# Patient Record
Sex: FEMALE | Race: WHITE | Employment: OTHER | URBAN - METROPOLITAN AREA
[De-identification: names, ages, dates, MRNs, and addresses within clinical notes are randomized per-mention and may not be internally consistent; named-entity substitution may affect disease eponyms.]

---

## 2017-08-14 ENCOUNTER — NURSING HOME VISIT (OUTPATIENT)
Dept: GERIATRICS | Facility: CLINIC | Age: 82
End: 2017-08-14
Payer: COMMERCIAL

## 2017-08-14 VITALS
WEIGHT: 120.8 LBS | HEART RATE: 73 BPM | SYSTOLIC BLOOD PRESSURE: 129 MMHG | BODY MASS INDEX: 20.1 KG/M2 | OXYGEN SATURATION: 95 % | RESPIRATION RATE: 18 BRPM | TEMPERATURE: 97.3 F | DIASTOLIC BLOOD PRESSURE: 73 MMHG

## 2017-08-14 DIAGNOSIS — S32.591D: ICD-10-CM

## 2017-08-14 DIAGNOSIS — J18.9 PNEUMONIA DUE TO INFECTIOUS ORGANISM, UNSPECIFIED LATERALITY, UNSPECIFIED PART OF LUNG: ICD-10-CM
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
## 2017-08-14 DIAGNOSIS — R41.0 DELIRIUM: ICD-10-CM

## 2017-08-14 DIAGNOSIS — D62 ANEMIA DUE TO BLOOD LOSS, ACUTE: ICD-10-CM

## 2017-08-14 DIAGNOSIS — S32.019D CLOSED FRACTURE OF FIRST LUMBAR VERTEBRA WITH ROUTINE HEALING, UNSPECIFIED FRACTURE MORPHOLOGY, SUBSEQUENT ENCOUNTER: ICD-10-CM

## 2017-08-14 DIAGNOSIS — M30.0 PAN (POLYARTERITIS NODOSA) (H): ICD-10-CM

## 2017-08-14 DIAGNOSIS — I50.22 CHRONIC SYSTOLIC CONGESTIVE HEART FAILURE (H): ICD-10-CM

## 2017-08-14 DIAGNOSIS — R41.89 COGNITIVE IMPAIRMENT: ICD-10-CM

## 2017-08-14 DIAGNOSIS — S72.001D CLOSED FRACTURE OF NECK OF RIGHT FEMUR WITH ROUTINE HEALING: Primary | ICD-10-CM

## 2017-08-14 DIAGNOSIS — R13.12 OROPHARYNGEAL DYSPHAGIA: ICD-10-CM

## 2017-08-14 PROCEDURE — 99310 SBSQ NF CARE HIGH MDM 45: CPT | Performed by: NURSE PRACTITIONER

## 2017-08-14 RX ORDER — AZATHIOPRINE 50 MG/1
150 TABLET ORAL DAILY
COMMUNITY

## 2017-08-14 RX ORDER — LANOLIN ALCOHOL/MO/W.PET/CERES
3 CREAM (GRAM) TOPICAL AT BEDTIME
COMMUNITY
Start: 2017-08-11

## 2017-08-14 RX ORDER — ALBUTEROL SULFATE 90 UG/1
2 AEROSOL, METERED RESPIRATORY (INHALATION) EVERY 4 HOURS PRN
COMMUNITY

## 2017-08-14 RX ORDER — CALCIUM CARBONATE 500(1250)
1 TABLET ORAL 2 TIMES DAILY
COMMUNITY
End: 2017-08-16

## 2017-08-14 RX ORDER — LEVOTHYROXINE SODIUM 88 UG/1
88 TABLET ORAL DAILY
COMMUNITY
Start: 2017-09-20

## 2017-08-14 RX ORDER — ACETAMINOPHEN 325 MG/1
650 TABLET ORAL 2 TIMES DAILY PRN
Status: ON HOLD | COMMUNITY
Start: 2017-08-11 | End: 2019-07-02

## 2017-08-14 NOTE — PROGRESS NOTES
[facility]  PRIMARY CARE PROVIDER AND CLINIC:  Susan Hanley [facility] [address] [facility] [address]  Chief Complaint   Patient presents with     Hospital F/U       HPI:    Rayne Mabry is a 87 year old  (4/26/1930),admitted to the [facility] from [facility].  Hospital stay 07/26/2017 through 08/11/2017.  Admitted to this facility for  rehab, medical management and nursing care.  HPI information obtained from: facility chart records, facility staff, patient report and Care Everywhere Epic chart review.    She was hospitalized after a fall resulting in right femur fracture, right inferior pubic ramus fracture and L1 vertebral fracture. She was disoriented in the ED. CT head negative for acute finding.   Ortho consulted and she underwent right LEXIS on 7/27/2017. Tolerated the procedure well. Lovenox started for DVT prophylaxis. She is WBAT. Neurosurgery recommended TLSO for L1 fracture.   On admission, she was hypoxic with leukocytosis. CT chest showed consolidative opacities and she received azithromycin and ceftriaxone for suspected pneumonia vs reactivation of mycobacterium avium complex pneumonia. ID consulted and felt low likelihood of MAC reactivation.   Hospital course complicated by post op delirium with hallucinations that improved after stopping all sedating meds. Zoloft was decreased per Psychiatry. She had an episode of Afib with RVR and probable systolic CHF exacerbation that improved with diuresis. HR spontaneously returned to NSR. She required luna catheter for urinary retention which resolved.     She is a poor historian due to cognitive impairment.     Current issues are:         Closed fracture of neck of right femur with routine healing-reports pain of her low back. Denies other areas of pain.   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   Delirium-confused with word finding difficulty. She's not able to state where she lives or what happened that she ended up in the hospital. Nurses report mild, non frightening hallucinations. Responds to simple yes/no questions.   Cognitive impairment  Oropharyngeal dysphagia-tolerating mechanical soft diet. Reports her appetite is \"so so.\"     BP's: 129/73, 109/75, 107/68  HR: 64-85     CODE STATUS/ADVANCE DIRECTIVES DISCUSSION:   CPR/Full code   Patient's living condition: lives alone    ALLERGIES:Zolpidem  PAST MEDICAL HISTORY:  has a past medical history of Arthritis; Depression; MAC (mycobacterium avium-intracellulare complex); Polyarteritis nodosa (H); and Thyroid disease.  PAST SURGICAL HISTORY:  has a past surgical history that includes GYN surgery.  FAMILY HISTORY: family history is not on file.  SOCIAL HISTORY:  reports that she has never smoked. She has never used smokeless tobacco. She reports that she drinks alcohol.    Post Discharge Medication Reconciliation Status: discharge medications reconciled, continue medications without change.  Current Outpatient Prescriptions   Medication Sig Dispense Refill     acetaminophen (TYLENOL) 325 MG tablet Take 650 mg by mouth every 4 hours (while awake)       albuterol (PROAIR HFA/PROVENTIL HFA/VENTOLIN HFA) 108 (90 BASE) MCG/ACT Inhaler Inhale 1-2 puffs into the lungs every 4 hours as needed       azaTHIOprine (IMURAN) 50 MG tablet Take 150 mg by mouth daily       docusate (COLACE) 50 MG/5ML liquid Take 10 mLs by mouth 2 times daily       enoxaparin (LOVENOX) 40 MG/0.4ML injection Inject 40 mg Subcutaneous daily " For 19 days.       melatonin 3 MG tablet Take 3 mg by mouth nightly as needed       omeprazole (PRILOSEC) 20 MG CR capsule Take 20 mg by mouth 2 times daily Do not crush       sertraline (ZOLOFT) 50 MG tablet Take 50 mg by mouth daily       levothyroxine (SYNTHROID/LEVOTHROID) 50 MCG tablet Take 50 mcg by mouth daily       METOPROLOL SUCCINATE ER PO Take 25 mg by mouth daily       calcium carbonate (OS-MICHELLE 500 MG [place]. [place]) 1250 MG tablet Take 1 tablet by mouth 2 times daily       cholecalciferol (VITAMIN D) 1000 UNIT tablet Take 1 tablet by mouth daily. 100 tablet        ROS:  Limited due to cognitive impairment. Does not respond appropriately to questions.     Exam:  /73  Pulse 73  Temp 97.3  F (36.3  C)  Resp 18  Wt 120 lb 12.8 oz (54.8 kg)  SpO2 95%  BMI 20.1 kg/m2  GENERAL APPEARANCE:  Alert, in no distress  ENT:  Mouth and posterior oropharynx normal, moist mucous membranes, [place]  EYES:  EOM normal, conjunctiva and lids normal, PERRL  NECK:  No adenopathy,masses or thyromegaly  RESP:  respiratory effort and palpation of chest normal, lungs clear to auscultation , no respiratory distress  CV:  Palpation and auscultation of heart done , regular rate and rhythm, no murmur, no edema, +2 pedal pulses  ABDOMEN:  normal bowel sounds, soft, nontender, no hepatosplenomegaly or other masses  M/S:   Wheelchair, wearing TLSO. Mild weakness RLE. Good strength other extremities, no joint inflammation  SKIN:  right hip incision clean,dry, intact, no erythema. No rashes or open areas  PSYCH:  oriented to self, situation, insight and judgement impaired, memory impaired    Lab/Diagnostic data:  PANEL BASIC METABOLIC (BMP) (08/09/2017 10:08 AM)  PANEL BASIC METABOLIC (BMP) (08/09/2017 10:08 AM)   Component Value Ref Range   Sodium 138 135 - 148 mEq/L   Potassium 4.2 3.5 - 5.3 mEq/L   Chloride 105 92 - 108 mEq/L   CO2 20 (L) 22 - 30 mEq/L   AnGap 14 8 - 16 mEq/L   Glucose 98 70 - 100 mg/dL   BUN 14 8 - 23 mg/dL    Creatinine 0.51 0.50 - 1.00 mg/dL   Calcium 9.3 8.8 - 10.2 mg/dL   GFR, African American 138 >=60 ml/min/1.73m2   GFR, Non- 114 >=60 ml/min/1.73m2   Basic Metabolic Panel Performed at: [facility]  Comment:   [facility]  [address] [place] [id number]           CBC WITH PLTS/AUTO DIFF (07/29/2017 11:18 AM)  CBC WITH PLTS/AUTO DIFF (07/29/2017 11:18 AM)   Component Value Ref Range   WBC 13.16 (H) 4.00 - 10.00 k/cmm   RBC 3.44 (L) 3.90 - 5.20 m/cmm   Hgb 10.6 (L) 11.5 - 15.7 g/dL   Hematocrit 32.8 (L) 34.0 - 45.0 %   MCV 95.3 80.0 - 100.0 fL   MCH 30.8 25.0 - 32.0 pg   MCHC 32.3 31.0 - 36.0 g/dL   RDW 13.6 11.5 - 14.5 %   Plt 148 (L) 150 - 400 k/cmm   MPV 10.3 6.5 - 12.5 fL   Automated Abs Neutrophil 11.53 (H)Comment: Preliminary ANC, final result to follow. 1.70 - 6.50 k/cmm   CBC Plt and Diff Performed at: [facility]  Comment:   [facility]  [address] [place] [id number]             ASSESSMENT / PLAN:  (S72.001D) Closed fracture of neck of right femur with routine healing  (primary encounter diagnosis)  (S32.591D) Fracture of right inferior pubic ramus, with routine healing, subsequent encounter  (S32.019D) Closed fracture of first lumbar vertebra with routine healing, unspecified fracture morphology, subsequent encounter  (W19.XXXD) Fall, subsequent encounter  Comment: pain appears controlled.   Plan: PHYSICAL THERAPY/OT.  TLSO when out of bed or with HOB >30 degrees. Lovenox for 19 days for DVT prophylaxis. Ortho follow up 9/6/2017. Neurosurgery follow up 1 month. Message left for daughter Darlene Mccloud.     (M30.0) PAN (polyarteritis nodosa) (H)  Comment: no acute issues. Neuropathy may have contributed to her fall.   Plan: continue Imuran. Rheumatology follow up per usual routine.     (I50.22) Chronic systolic congestive heart failure (H)  Comment: appears compensated. Not on diuretic.   Plan: daily weight. Monitor for s/s of fluid overload. Start low dose lasix if needed.     (D62)  Anemia due to blood loss, acute  Comment: surgical blood loss  Plan: CBC, BMP.     (J18.9) Pneumonia due to infectious organism, unspecified laterality, unspecified part of lung  Comment: infection appears resolved. History of mycobacterium avium complex.   Plan: monitor respiratory status. Continue albuterol prn.     (R41.0) Delirium  (R41.89) Cognitive impairment  Comment: remains confused with word finding difficulty and hallucinations.   Plan: supportive care. Avoid narcotics.     (R13.12) Oropharyngeal dysphagia  Comment: possibly related to delirium. Tolerating mechanical soft diet.   Plan: SPEECH THERAPY eval and treat, advance diet as tolerated.         Electronically signed by:  HADLEY Parra CNP

## 2017-08-15 ENCOUNTER — TRANSFERRED RECORDS (OUTPATIENT)
Dept: HEALTH INFORMATION MANAGEMENT | Facility: CLINIC | Age: 82
End: 2017-08-15

## 2017-08-15 PROBLEM — D62 ANEMIA DUE TO BLOOD LOSS, ACUTE: Status: ACTIVE | Noted: 2017-08-15

## 2017-08-15 PROBLEM — R41.0 DELIRIUM: Status: ACTIVE | Noted: 2017-08-15

## 2017-08-15 PROBLEM — S32.591D: Status: ACTIVE | Noted: 2017-08-15
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
## 2017-08-15 PROBLEM — R53.81 PHYSICAL DECONDITIONING: Status: ACTIVE | Noted: 2017-08-15

## 2017-08-15 PROBLEM — R41.89 COGNITIVE IMPAIRMENT: Status: ACTIVE | Noted: 2017-08-15

## 2017-08-15 PROBLEM — J18.9 PNEUMONIA: Status: ACTIVE | Noted: 2017-08-15

## 2017-08-15 PROBLEM — R13.12 OROPHARYNGEAL DYSPHAGIA: Status: ACTIVE | Noted: 2017-08-15

## 2017-08-15 PROBLEM — S72.001D CLOSED FRACTURE OF NECK OF RIGHT FEMUR WITH ROUTINE HEALING: Status: ACTIVE | Noted: 2017-08-15

## 2017-08-15 PROBLEM — I50.22 CHRONIC SYSTOLIC CONGESTIVE HEART FAILURE (H): Status: ACTIVE | Noted: 2017-08-15

## 2017-08-15 PROBLEM — S32.019D CLOSED FRACTURE OF FIRST LUMBAR VERTEBRA WITH ROUTINE HEALING: Status: ACTIVE | Noted: 2017-08-15

## 2017-08-15 LAB
ANION GAP SERPL CALCULATED.3IONS-SCNC: 11 MMOL/L (ref 3–14)
BUN SERPL-MCNC: 14 MG/DL (ref 7–30)
CALCIUM SERPL-MCNC: 9.8 MG/DL (ref 8.5–10.1)
CHLORIDE SERPLBLD-SCNC: 101 MMOL/L (ref 94–109)
CO2 SERPL-SCNC: 25 MMOL/L (ref 20–32)
CREAT SERPL-MCNC: 0.47 MG/DL (ref 0.52–1.04)
ERYTHROCYTE [DISTWIDTH] IN BLOOD BY AUTOMATED COUNT: 13.8 % (ref 10–15)
GFR SERPL CREATININE-BSD FRML MDRD: >90 ML/MIN/1.73M2
GLUCOSE SERPL-MCNC: 72 MG/DL (ref 70–99)
HCT VFR BLD AUTO: 38.4 % (ref 35–47)
HEMOGLOBIN: 12.5 G/DL (ref 11.7–15.7)
MCH RBC QN AUTO: 31.3 PG (ref 26.5–33)
MCHC RBC AUTO-ENTMCNC: 32.6 G/DL (ref 31.5–36.5)
MCV RBC AUTO: 96 FL (ref 78–100)
PLATELET # BLD AUTO: 564 10^9/L (ref 150–450)
POTASSIUM SERPL-SCNC: 3.3 MMOL/L (ref 3.4–5.3)
RBC # BLD AUTO: 4 10^12/L (ref 3.8–5.2)
SODIUM SERPL-SCNC: 137 MMOL/L (ref 133–144)
WBC # BLD AUTO: 13 10^9/L (ref 4–11)

## 2017-08-16 ENCOUNTER — NURSING HOME VISIT (OUTPATIENT)
Dept: GERIATRICS | Facility: CLINIC | Age: 82
End: 2017-08-16
Payer: COMMERCIAL

## 2017-08-16 VITALS
BODY MASS INDEX: 19.46 KG/M2 | DIASTOLIC BLOOD PRESSURE: 66 MMHG | SYSTOLIC BLOOD PRESSURE: 111 MMHG | TEMPERATURE: 97.7 F | RESPIRATION RATE: 18 BRPM | HEIGHT: 65 IN | OXYGEN SATURATION: 94 % | WEIGHT: 116.8 LBS | HEART RATE: 88 BPM

## 2017-08-16 DIAGNOSIS — S32.019D CLOSED FRACTURE OF FIRST LUMBAR VERTEBRA WITH ROUTINE HEALING, UNSPECIFIED FRACTURE MORPHOLOGY, SUBSEQUENT ENCOUNTER: ICD-10-CM

## 2017-08-16 DIAGNOSIS — R13.12 OROPHARYNGEAL DYSPHAGIA: ICD-10-CM

## 2017-08-16 DIAGNOSIS — S72.001D CLOSED FRACTURE OF NECK OF RIGHT FEMUR WITH ROUTINE HEALING: Primary | ICD-10-CM

## 2017-08-16 DIAGNOSIS — R41.89 COGNITIVE IMPAIRMENT: ICD-10-CM

## 2017-08-16 DIAGNOSIS — S32.591D: ICD-10-CM

## 2017-08-16 DIAGNOSIS — R53.81 PHYSICAL DECONDITIONING: ICD-10-CM

## 2017-08-16 DIAGNOSIS — R41.0 DELIRIUM: ICD-10-CM

## 2017-08-16 DIAGNOSIS — I50.22 CHRONIC SYSTOLIC CONGESTIVE HEART FAILURE (H): ICD-10-CM

## 2017-08-16 DIAGNOSIS — M30.0 PAN (POLYARTERITIS NODOSA) (H): ICD-10-CM

## 2017-08-16 PROCEDURE — 99309 SBSQ NF CARE MODERATE MDM 30: CPT | Performed by: NURSE PRACTITIONER

## 2017-08-16 NOTE — PROGRESS NOTES
"[facility]    Chief Complaint   Patient presents with     RECHECK       HPI:    Rayne Mabry is a 87 year old  (4/26/1930), who is being seen today for an episodic care visit at [place] [facility].  HPI information obtained from: facility chart records, facility staff, patient report and Care Everywhere Epic chart review and patient's daughter Shanel Dubois.   She came to this facility  for short term rehab and medical management 8/11/2017 [place] [facility] [place] a fall resulting in right femur fracture, right inferior pubic ramus fracture and L1 vertebral fracture. She was disoriented in the ED. CT head negative for acute finding. She underwent right LEXIS on 7/27/2017. Lovenox started for DVT prophylaxis. She is WBAT. Neurosurgery recommended TLSO for L1 fracture.   On hospital admission, she was hypoxic with leukocytosis. CT chest showed consolidative opacities and she received azithromycin and ceftriaxone for suspected pneumonia vs reactivation of mycobacterium avium complex pneumonia. ID consulted and felt low likelihood of MAC reactivation.   Hospital course complicated by post op delirium with hallucinations that improved after stopping all sedating meds. Zoloft was decreased per Psychiatry. She had an episode of Afib with RVR and probable systolic CHF exacerbation that improved with diuresis. HR spontaneously returned to NSR.      Current issues are:       Closed fracture of neck of right femur with routine healing  Closed fracture of first lumbar vertebra with routine healing, unspecified fracture morphology, subsequent encounter  Fracture of right inferior pubic ramus, with routine healing, subsequent encounter-reports low back pain is improving and controlled with tylenol. Compliant with TLSO. Denies other areas of pain.   Delirium  Cognitive impairment-she's more interactive today, but remains confused. She recognizes that she is confused and \"foggy.\" Daughter reports that prior to the fall, " patient was independent at home and driving. Son manages finances.   Chronic systolic congestive heart failure (H)-denies cough, shortness of breath or chest pain.   PAN (polyarteritis nodosa) (H)-her rheumatologist is Dr Juan Do. Daughter reports this has been well controlled with Imuran.   Oropharyngeal dysphagia-speech therapist reports significant pocketing of food and the need for cues with eating. Daughter does not believe dysphagia was a previous problem. No history of CVA.   Physical deconditioning-ambulating 56 ft with walker and contact guard assist. Requires max assist with all cares       Daughter confirms patient has an Advanced Directive and believes code status is DNR/DNI-she will review and bring in a copy.     BP's: 111/66, 129/73, 109/75  HR: 73-88     ALLERGIES: Zolpidem  Past Medical, Surgical, Family and Social History reviewed and updated in [employer].    Current Outpatient Prescriptions   Medication Sig Dispense Refill     Calcium-Magnesium-Vitamin D (CALCIUM 500 PO) Take 500 mg by mouth 2 times daily       POTASSIUM CHLORIDE ER PO Take 10 mEq by mouth daily       acetaminophen (TYLENOL) 325 MG tablet Take 650 mg by mouth every 4 hours (while awake)       albuterol (PROAIR HFA/PROVENTIL HFA/VENTOLIN HFA) 108 (90 BASE) MCG/ACT Inhaler Inhale 1-2 puffs into the lungs every 4 hours as needed       azaTHIOprine (IMURAN) 50 MG tablet Take 150 mg by mouth daily       docusate (COLACE) 50 MG/5ML liquid Take 10 mLs by mouth 2 times daily       enoxaparin (LOVENOX) 40 MG/0.4ML injection Inject 40 mg Subcutaneous daily For 16 days.       melatonin 3 MG tablet Take 3 mg by mouth nightly as needed       omeprazole (PRILOSEC) 20 MG CR capsule Take 20 mg by mouth 2 times daily Do not crush       sertraline (ZOLOFT) 50 MG tablet Take 50 mg by mouth daily       levothyroxine (SYNTHROID/LEVOTHROID) 50 MCG tablet Take 50 mcg by mouth daily       METOPROLOL SUCCINATE ER PO Take 25 mg by mouth daily        "cholecalciferol (VITAMIN D) 1000 UNIT tablet Take 1 tablet by mouth daily. 100 tablet      Medications reviewed:  Medications reconciled to facility chart and changes were made to reflect current medications as identified as above med list. Below are the changes that were made:   Medications stopped since last EPIC medication reconciliation:   Medications Discontinued During This Encounter   Medication Reason     calcium carbonate (OS-MICHELLE 500 MG [place]. [place]) 1250 MG tablet Medication Reconciliation Clean Up       Medications started since last [facility] medication reconciliation:  Orders Placed This Encounter   Medications     Calcium-Magnesium-Vitamin D (CALCIUM 500 PO)     Sig: Take 500 mg by mouth 2 times daily     POTASSIUM CHLORIDE ER PO     Sig: Take 10 mEq by mouth daily       REVIEW OF SYSTEMS:  10 point ROS of systems including Constitutional, Eyes, Respiratory, Cardiovascular, Gastroenterology, Genitourinary, Integumentary, Muscularskeletal, Psychiatric were all negative except for pertinent positives noted in my HPI.    Physical Exam:  /66  Pulse 88  Temp 97.7  F (36.5  C)  Resp 18  Ht 5' 5\" (1.651 m)  Wt 116 lb 12.8 oz (53 kg)  SpO2 94%  BMI 19.44 kg/m2  GENERAL APPEARANCE:  Alert, in no distress  ENT:  Mouth and posterior oropharynx normal, moist mucous membranes, [place]  EYES:  EOM normal, conjunctiva and lids normal  NECK:  No adenopathy,masses or thyromegaly  RESP:  respiratory effort and palpation of chest normal, lungs clear to auscultation , no respiratory distress  CV:  Palpation and auscultation of heart done , regular rate and rhythm, no murmur, no edema, +2 pedal pulses  ABDOMEN:  soft, nontender, no hepatosplenomegaly or other masses  M/S:  Sitting up in bed,  wearing TLSO. Mild weakness RLE. Good strength other extremities, no joint inflammation  SKIN:  right hip incision with steri strips clean,dry, intact, no erythema. No rashes or open areas  PSYCH:  oriented to self, situation, insight " and judgement impaired, memory impaired    Recent Labs:    Last Basic Metabolic Panel:  Lab Results   Component Value Date     08/15/2017      Lab Results   Component Value Date    POTASSIUM 3.3 08/15/2017     Lab Results   Component Value Date    CHLORIDE 101 08/15/2017     Lab Results   Component Value Date    MICHELLE 9.8 08/15/2017     Lab Results   Component Value Date    CO2 25 08/15/2017     Lab Results   Component Value Date    BUN 14 08/15/2017     Lab Results   Component Value Date    CR 0.47 08/15/2017     Lab Results   Component Value Date    GLC 72 08/15/2017     Lab Results   Component Value Date    WBC 13.0 08/15/2017     Lab Results   Component Value Date    RBC 4.00 08/15/2017     Lab Results   Component Value Date    HGB 12.5 08/15/2017     Lab Results   Component Value Date    HCT 38.4 08/15/2017     Lab Results   Component Value Date    MCV 96 08/15/2017     Lab Results   Component Value Date    MCH 31.3 08/15/2017     Lab Results   Component Value Date    MCHC 32.6 08/15/2017     Lab Results   Component Value Date    RDW 13.8 08/15/2017     Lab Results   Component Value Date     08/15/2017     ASSESSMENT / PLAN:  (S72.001D) Closed fracture of neck of right femur with routine healing  (primary encounter diagnosis)  (S32.019D) Closed fracture of first lumbar vertebra with routine healing, unspecified fracture morphology, subsequent encounter  (S32.591D) Fracture of right inferior pubic ramus, with routine healing, subsequent encounter  Comment: pain and mobility improving  Plan: continue tylenol. Therapies. TLSO when out of bed or HOB >30 degrees. Lovenox for 19 days total. Ortho follow up 9/6/2017. Neurosurgery follow up 1 month.    (R41.0) Delirium  (R41.89) Cognitive impairment  Comment: delirium appears to be clearing, although she continues to have significant deficits  Plan: cognitive testing in progress. Supportive care. If cognition does not continue to improve, consider repeat CT  head or possibly MRI.     (I50.22) Chronic systolic congestive heart failure (H)  Comment: compensated. Not on diuretic  Plan: closely monitor fluid status. Daily weight.     (M30.0) PAN (polyarteritis nodosa) (H)  Comment: no acute issues  Plan: continue Imuran.     (R13.12) Oropharyngeal dysphagia  Comment: etiology unclear, presumably related to delirium and acute illness  Plan: continue SPEECH THERAPY, advance diet as tolerated    (R53.81) Physical deconditioning  Comment: progressing in therapies  Plan: continue PHYSICAL THERAPY/OT. Disposition unclear at this time; family hoping she will be able to return home.         Electronically signed by  HADLEY Parra CNP

## 2017-08-17 ENCOUNTER — TRANSFERRED RECORDS (OUTPATIENT)
Dept: HEALTH INFORMATION MANAGEMENT | Facility: CLINIC | Age: 82
End: 2017-08-17

## 2017-08-17 VITALS
HEIGHT: 65 IN | RESPIRATION RATE: 18 BRPM | TEMPERATURE: 98.1 F | HEART RATE: 82 BPM | DIASTOLIC BLOOD PRESSURE: 67 MMHG | WEIGHT: 116.8 LBS | BODY MASS INDEX: 19.46 KG/M2 | SYSTOLIC BLOOD PRESSURE: 112 MMHG | OXYGEN SATURATION: 95 %

## 2017-08-17 LAB
ERYTHROCYTE [DISTWIDTH] IN BLOOD BY AUTOMATED COUNT: 13.9 % (ref 10–15)
HCT VFR BLD AUTO: 37.8 % (ref 35–47)
HEMOGLOBIN: 12.4 G/DL (ref 11.7–15.7)
MCH RBC QN AUTO: 31.3 PG (ref 26.5–33)
MCHC RBC AUTO-ENTMCNC: 32.8 G/DL (ref 31.5–36.5)
MCV RBC AUTO: 96 FL (ref 78–100)
PLATELET # BLD AUTO: 515 10E9/L (ref 150–450)
POTASSIUM SERPL-SCNC: 3.4 MMOL/L (ref 3.4–5.3)
RBC # BLD AUTO: 3.96 10E12/L (ref 3.8–5.2)
WBC # BLD AUTO: 9.9 10E9/L (ref 4–11)

## 2017-08-18 ENCOUNTER — NURSING HOME VISIT (OUTPATIENT)
Dept: GERIATRICS | Facility: CLINIC | Age: 82
End: 2017-08-18
Payer: COMMERCIAL

## 2017-08-18 DIAGNOSIS — R53.81 PHYSICAL DECONDITIONING: Primary | ICD-10-CM

## 2017-08-18 DIAGNOSIS — S32.019D CLOSED FRACTURE OF FIRST LUMBAR VERTEBRA WITH ROUTINE HEALING, UNSPECIFIED FRACTURE MORPHOLOGY, SUBSEQUENT ENCOUNTER: ICD-10-CM

## 2017-08-18 DIAGNOSIS — S72.001D CLOSED FRACTURE OF NECK OF RIGHT FEMUR WITH ROUTINE HEALING: ICD-10-CM

## 2017-08-18 DIAGNOSIS — R13.12 OROPHARYNGEAL DYSPHAGIA: ICD-10-CM

## 2017-08-18 DIAGNOSIS — Z96.641 S/P TOTAL HIP ARTHROPLASTY, RIGHT: ICD-10-CM

## 2017-08-18 DIAGNOSIS — M30.0 PAN (POLYARTERITIS NODOSA) (H): ICD-10-CM

## 2017-08-18 DIAGNOSIS — R41.89 COGNITIVE IMPAIRMENT: ICD-10-CM

## 2017-08-18 PROCEDURE — 99306 1ST NF CARE HIGH MDM 50: CPT | Performed by: INTERNAL MEDICINE

## 2017-08-18 NOTE — PROGRESS NOTES
PRIMARY CARE PROVIDER AND CLINIC RESPONSIBLE:  Susan Hanley [place] [facility] [address] [facility] [address]        ADMISSION HISTORY AND PHYSICAL EXAMINATION     Chief Complaint   Patient presents with     Fatigue     Musculoskeletal Problem     Confusion         HISTORY OF PRESENT ILLNESS:  87 year old female, (4/26/1930), admitted to the [facility] for continuation of medical care and rehab.    Rayne Mabry is a 87 year old female with history of depression, hypothyroidism, polyarteritis nodosa, hx of BILLIE complex who was admitted at [facility] from 7/26/17 to 8/11/17 due to mechanical fall with multiple trauma. Patient is poor historian and confused and unable to get history from her. Patient was delirious on admission at the hospital and confused throughout the her stay. Her injures include:  Displaced right femur fracture which required right LEXIS, non displaced inferior pubioc ramus fracture, L1 compression fracture, TLSO ordered by neurosurgery. Post-op course was significant an episode of Afib with RVR and probable systolic CHF exacerbation that improved with diuresis. HR spontaneously returned to NSR. She required luna catheter for urinary retention which resolved. Postop delirium with hallucinations that improved after stopping all sedating meds. Zoloft was decreased per Psychiatry. CT head was negative at the hospital. On admission, she was hypoxic with leukocytosis. CT chest showed consolidative opacities and she received azithromycin and ceftriaxone for suspected pneumonia vs reactivation of mycobacterium avium complex pneumonia. ID consulted and felt low likelihood of MAC reactivation. She is confused and poor historian since admission at TCU. She does not recall her children, their ages, where she lives and etc. Patient is seen and examined by me with Juan Diego Muse NP. Please see BOBBY Muse's admit noted dated 8/14/17 for details of admission, past medical history, family  history, allergies, medication list, social history and other details pertinent with this admission. Hospital admission and dc summary reviewed.      Past Medical History:   Diagnosis Date     Arthritis     rheumatoid arthritis     Depression      MAC (mycobacterium avium-intracellulare complex)      Polyarteritis nodosa (H)      Thyroid disease        Past Surgical History:   Procedure Laterality Date     GYN SURGERY      hyster       Current Outpatient Prescriptions   Medication Sig     Calcium-Magnesium-Vitamin D (CALCIUM 500 PO) Take 500 mg by mouth 2 times daily     POTASSIUM CHLORIDE ER PO Take 10 mEq by mouth daily     acetaminophen (TYLENOL) 325 MG tablet Take 650 mg by mouth every 4 hours (while awake)     albuterol (PROAIR HFA/PROVENTIL HFA/VENTOLIN HFA) 108 (90 BASE) MCG/ACT Inhaler Inhale 1-2 puffs into the lungs every 4 hours as needed     azaTHIOprine (IMURAN) 50 MG tablet Take 150 mg by mouth daily     docusate (COLACE) 50 MG/5ML liquid Take 10 mLs by mouth 2 times daily     enoxaparin (LOVENOX) 40 MG/0.4ML injection Inject 40 mg Subcutaneous daily For 16 days.     melatonin 3 MG tablet Take 3 mg by mouth At Bedtime      omeprazole (PRILOSEC) 20 MG CR capsule Take 20 mg by mouth 2 times daily Do not crush     sertraline (ZOLOFT) 50 MG tablet Take 50 mg by mouth daily     levothyroxine (SYNTHROID/LEVOTHROID) 50 MCG tablet Take 50 mcg by mouth daily     METOPROLOL SUCCINATE ER PO Take 25 mg by mouth daily Hold for SBP<100 or HR < 60     cholecalciferol (VITAMIN D) 1000 UNIT tablet Take 1 tablet by mouth daily.     No current facility-administered medications for this visit.        Allergies   Allergen Reactions     Zolpidem Other (See Comments)     Confusion even with low dose.       Social History     Social History     Marital status:      Spouse name: N/A     Number of children: N/A     Years of education: N/A     Occupational History     Not on file.     Social History Main Topics     Smoking  "status: Never Smoker     Smokeless tobacco: Never Used     Alcohol use Yes      Comment: 1 glass wine a night     Drug use: Not on file     Sexual activity: Not on file     Other Topics Concern     Not on file     Social History Narrative          Information reviewed:  Medications, vital signs, orders, nursing notes, problem list, hospital information.     ROS: Attempted 10 point review of system, unable due to cognitive impairment.    /67  Pulse 82  Temp 98.1  F (36.7  C)  Resp 18  Ht 5' 5\" (1.651 m)  Wt 116 lb 12.8 oz (53 kg)  SpO2 95%  BMI 19.44 kg/m2    PHYSICAL EXAMINATION:   GENERAL:  No acute distress.  SKIN:  Dry and warm.  There is no rash at area of skin examined.  HEENT:  Head without trauma.  Pupils round, reactive. Exam of conjunctiva and lids are normal. Sclera without icterus. There is no oral thrush.  NECK:  Supple. No jugular venous distension.  CHEST: No reproducible chest tenderness.   LUNGS:  Normal respiratory effort. Lungs reveal decreased breath sounds at bases. No rales or wheezes.  HEART:  Regular rate and rhythm.  No murmur, gallops or rubs auscultated.  ABDOMEN:  Soft, bowel sounds positive.  There is no tenderness or guarding.   EXTREMITIES: +1 right edema. Surgical site healing with steri-strips, mild tenderness.   NEUROLOGIC:  Alert and oriented x3.  Moving all ext. Gait not tested.  PSYCH: Recent and remote memory is impaired. Mood and affect are normal.    Lab/Diagnostic data:  Reviewed    CBC Lab Results (Last 5 results in 365 days)       WBC RBC Hgb Hct MCV MCH MCHC RDW Plt Neut # Lymph # Eos # Baso # Immat. Gran #   08/17/17 0000 9.9 3.96 12.4 37.8 96 31.3 32.8 13.9 515 -- -- -- -- --   08/15/17 0000 13.0 4.00 12.5 38.4 96 31.3 32.6 13.8 564 -- -- -- -- --   CMP Labs (Last 5 results in 365 days)       Na+ K+ Cl CO2 ANION GAP Glc BUN Creat GFR GFR-Black Calcium Mg ALT AST A1C TSH LDL HIV   08/17/17 0000 -- 3.4 -- -- -- -- -- -- -- -- -- -- -- -- -- -- -- --   08/15/17 " 0000 137 3.3 101 25 11 72 14 0.47 >90 >90 9.8 -- -- -- -- -- -- --     ECHO: 7/27/17  SUMMARY    The estimated left ventricular ejection fraction is 57 %.  There is no left ventricular wall motion abnormality identified.  The estimated pulmonary artery systolic pressure is 39 mmHg + RA pressure.  Right ventricular enlargement .  Decreased right ventricular systolic performance .  Inferior Vena Cava is dilated without respiratory variation .    CT head 7/26/17  Impression:    1. No acute intracranial pathology.  2. Mild changes of chronic small vessel ischemic disease.  3. Mild generalized parenchymal volume loss, appropriate for age.      ASSESSMENT / PLAN:     Physical deconditioning  Plan: PT/OT with increase independence, self-care, strength and endurance and other cares that help return to home/facility of origin, fall precautions. Care conference with patient and family for the progress of rehab and disposition issues will be discussed as planned. Rehab evaluation and other evaluations including CPT are at rehab logs, to be reviewed separately.  Fall risk assessment as well as cognitive evaluation so far performed:  SLUMS:  5/30    Closed fracture of neck of right femur with routine healing S/P total hip arthroplasty, right  Plan: PT/OT, pain medication, DVT prophylaxis with Lovenox as per orthopedic team. Follow up orthopedic clinic as scheduled. Incentive spirometry prn.    Closed fracture of first lumbar vertebra with routine healing, unspecified fracture morphology, subsequent encounter  Plan: Continue TLSO. Follow up neurosurgery team as scheduled.    Cognitive impairment  Plan: most likely due to dementia. SUMS 5/30. Continue fall and delirium prevention.      PAN (polyarteritis nodosa) (H)  Plan: Continue Imuran, follow up rheumatologist as outpatient.    Oropharyngeal dysphagia  Plan: Continue DD diet, speech therapist to guide treatment and evaluate progress.    Other problems with same care. Primary  care doctor and other specialists to address those chronic problems in next clinic appointment to be scheduled upon discharge from the TCU.      Total time spent with patient visit was 36 min including patient visit, review of past records, patients counseling and coordinating care.        Jos Guevara MD

## 2017-08-18 NOTE — MR AVS SNAPSHOT
"              After Visit Summary   8/18/2017    Rayne Mabry    MRN: [id number]           Patient Information     Date Of Birth          4/26/1930        Visit Information        Provider Department      8/18/2017 11:29 AM Jos Guevara MD [facility]        Today's Diagnoses     Physical deconditioning    -  1    Closed fracture of neck of right femur with routine healing        S/P total hip arthroplasty, right        Closed fracture of first lumbar vertebra with routine healing, unspecified fracture morphology, subsequent encounter        Cognitive impairment        PAN (polyarteritis nodosa) (H)        Oropharyngeal dysphagia           Follow-ups after your visit        Who to contact     If you have questions or need follow up information about today's clinic visit or your schedule please contact [facility] directly at [phone].  Normal or non-critical lab and imaging results will be communicated to you by MyChart, letter or phone within 4 business days after the clinic has received the results. If you do not hear from us within 7 days, please contact the clinic through MyChart or phone. If you have a critical or abnormal lab result, we will notify you by phone as soon as possible.  Submit refill requests through [employer]â„¢ or call your pharmacy and they will forward the refill request to us. Please allow 3 business days for your refill to be completed.          Additional Information About Your Visit        [employer] Information     [employer] lets you send messages to your doctor, view your test results, renew your prescriptions, schedule appointments and more. To sign up, go to [email or web address] . Click on \"Log in\" on the left side of the screen, which will take you to the Welcome page. Then click on \"Sign up Now\" on the right side of the page.     You will be asked to enter the access code listed below, as well as some personal information. Please follow the " "directions to create your username and password.     Your access code is: [email or web address]  Expires: 2017 11:03 AM     Your access code will  in 90 days. If you need help or a new code, please call your [facility] clinic or [phone].        Care EveryWhere ID     This is your Care EveryWhere ID. This could be used by other organizations to access your [facility] medical records  [id number]        Your Vitals Were     Pulse Temperature Respirations Height Pulse Oximetry BMI (Body Mass Index)    82 98.1  F (36.7  C) 18 5' 5\" (1.651 m) 95% 19.44 kg/m2       Blood Pressure from Last 3 Encounters:   17 112/67   17 111/66   17 129/73    Weight from Last 3 Encounters:   17 116 lb 12.8 oz (53 kg)   17 116 lb 12.8 oz (53 kg)   17 120 lb 12.8 oz (54.8 kg)              Today, you had the following     No orders found for display       Primary Care Provider Office Phone # Fax #    Susan Hanley [phone]       [address] [address]        Equal Access to Services     JEANNE GOMEZ AH: Hadii lizette ku hadasho Soomaali, waaxda luqadaha, qaybta kaalmada adeegyada, waxay idiin hayhermelindan elizabeth pichardo . So [facility] [phone].    ATENCIÓN: Si habla español, tiene a gardiner disposición servicios gratuitos de asistencia lingüística. ame al [phone].    We comply with applicable federal civil rights laws and [place] laws. We do not discriminate on the basis of race, color, national origin, age, disability sex, sexual orientation or gender identity.            Thank you!     Thank you for choosing [facility]  for your care. Our goal is always to provide you with excellent care. Hearing back from our patients is one way we can continue to improve our services. Please take a few minutes to complete the written survey that you may receive in the mail after your visit with us. Thank you!             Your Updated " Medication List - Protect others around you: Learn how to safely use, store and throw away your medicines at www.disposemymeds.org.          This list is accurate as of: 8/18/17 11:03 AM.  Always use your most recent med list.                   Brand Name Dispense Instructions for use Diagnosis    acetaminophen 325 MG tablet    TYLENOL     Take 650 mg by mouth every 4 hours (while awake)        albuterol 108 (90 BASE) MCG/ACT Inhaler    PROAIR HFA/PROVENTIL HFA/VENTOLIN HFA     Inhale 1-2 puffs into the lungs every 4 hours as needed        azaTHIOprine 50 MG tablet    IMURAN     Take 150 mg by mouth daily        CALCIUM 500 PO      Take 500 mg by mouth 2 times daily        cholecalciferol 1000 UNIT tablet    vitamin D    100 tablet    Take 1 tablet by mouth daily.    PAN (polyarteritis nodosa) (H)       docusate 50 MG/5ML liquid    COLACE     Take 10 mLs by mouth 2 times daily        enoxaparin 40 MG/0.4ML injection    LOVENOX     Inject 40 mg Subcutaneous daily For 16 days.        levothyroxine 50 MCG tablet    SYNTHROID/LEVOTHROID     Take 50 mcg by mouth daily        melatonin 3 MG tablet      Take 3 mg by mouth At Bedtime        METOPROLOL SUCCINATE ER PO      Take 25 mg by mouth daily Hold for SBP<100 or HR < 60        omeprazole 20 MG CR capsule    priLOSEC     Take 20 mg by mouth 2 times daily Do not crush        POTASSIUM CHLORIDE ER PO      Take 10 mEq by mouth daily        sertraline 50 MG tablet    ZOLOFT     Take 50 mg by mouth daily

## 2017-08-21 VITALS
OXYGEN SATURATION: 99 % | HEART RATE: 74 BPM | WEIGHT: 117 LBS | BODY MASS INDEX: 19.49 KG/M2 | HEIGHT: 65 IN | RESPIRATION RATE: 18 BRPM | TEMPERATURE: 97.3 F | SYSTOLIC BLOOD PRESSURE: 135 MMHG | DIASTOLIC BLOOD PRESSURE: 85 MMHG

## 2017-08-22 ENCOUNTER — NURSING HOME VISIT (OUTPATIENT)
Dept: GERIATRICS | Facility: CLINIC | Age: 82
End: 2017-08-22
Payer: COMMERCIAL

## 2017-08-22 DIAGNOSIS — I50.22 CHRONIC SYSTOLIC CONGESTIVE HEART FAILURE (H): ICD-10-CM

## 2017-08-22 DIAGNOSIS — S32.019D CLOSED FRACTURE OF FIRST LUMBAR VERTEBRA WITH ROUTINE HEALING, UNSPECIFIED FRACTURE MORPHOLOGY, SUBSEQUENT ENCOUNTER: ICD-10-CM

## 2017-08-22 DIAGNOSIS — R13.12 OROPHARYNGEAL DYSPHAGIA: ICD-10-CM

## 2017-08-22 DIAGNOSIS — J18.9 PNEUMONIA DUE TO INFECTIOUS ORGANISM, UNSPECIFIED LATERALITY, UNSPECIFIED PART OF LUNG: ICD-10-CM

## 2017-08-22 DIAGNOSIS — E87.6 HYPOKALEMIA: ICD-10-CM

## 2017-08-22 DIAGNOSIS — S32.591D: ICD-10-CM

## 2017-08-22 DIAGNOSIS — S72.001D CLOSED FRACTURE OF NECK OF RIGHT FEMUR WITH ROUTINE HEALING: Primary | ICD-10-CM

## 2017-08-22 DIAGNOSIS — R41.89 COGNITIVE IMPAIRMENT: ICD-10-CM

## 2017-08-22 DIAGNOSIS — R53.81 PHYSICAL DECONDITIONING: ICD-10-CM

## 2017-08-22 PROCEDURE — 99309 SBSQ NF CARE MODERATE MDM 30: CPT | Performed by: NURSE PRACTITIONER

## 2017-08-22 RX ORDER — AMOXICILLIN 250 MG
1 CAPSULE ORAL DAILY PRN
Status: ON HOLD | COMMUNITY
End: 2019-07-03

## 2017-08-22 NOTE — PROGRESS NOTES
[facility]    Chief Complaint   Patient presents with     RECHECK       HPI:    Rayne Mabry is a 87 year old  (4/26/1930), who is being seen today for an episodic care visit at [place] [facility].  HPI information obtained from: facility chart records, facility staff, patient report and Care Everywhere Epic chart review and patient's daughter Shanel Dubois.   She came to this facility  for short term rehab and medical management 8/11/2017 from [facility] after a fall resulting in right femur fracture, right inferior pubic ramus fracture and L1 vertebral fracture. She was disoriented in the ED. CT head negative for acute finding. She underwent right LEXIS on 7/27/2017. Lovenox started for DVT prophylaxis. She is WBAT. Neurosurgery recommended TLSO for L1 fracture.   On hospital admission, she was hypoxic with leukocytosis. CT chest showed consolidative opacities and she received azithromycin and ceftriaxone for suspected pneumonia vs reactivation of mycobacterium avium complex pneumonia. ID consulted and felt low likelihood of MAC reactivation.   Hospital course complicated by post op delirium with hallucinations that improved after stopping all sedating meds. Zoloft was decreased per Psychiatry. She had an episode of Afib with RVR and probable systolic CHF exacerbation that improved with diuresis. HR spontaneously returned to NSR.        Current issues are:       Closed fracture of neck of right femur with routine healing-reports pain is controlled. Her daughter Darlene Mccloud is here and feels she's making progress. Compliant with TLSO.   Fracture of right inferior pubic ramus, with routine healing, subsequent encounter  Closed fracture of first lumbar vertebra with routine healing, unspecified fracture morphology, subsequent encounter  Chronic systolic congestive heart failure (H)-weight down 3 lbs. No edema. HR: 64-88 and has been regular since tcu admission.  BPs: 135/85, 101/67, 119/77  mmHg  Oropharyngeal dysphagia-remains on mechanical soft diet and tends to pocket food. No coughing or choking with meals.   Pneumonia due to infectious organism, unspecified laterality, unspecified part of lung-afebrile. Denies cough, shortness of breath or respiratory symptoms.   Cognitive impairment-brighter and more interactive. Responds to questions appropriately. Daughter agrees she has improved, but reports she is not back to her baseline.   Hypokalemia-replacement started for K 3.3. No GI losses, not on diuretic.   Physical deconditioning-ambulating short distances with walker and contact guard assist. Requires assist of 1 with all cares      ALLERGIES: Zolpidem  Past Medical, Surgical, Family and Social History reviewed and updated in [facility].    Current Outpatient Prescriptions   Medication Sig Dispense Refill     senna-docusate (SENOKOT-S;PERICOLACE) 8.6-50 MG per tablet Take 1 tablet by mouth 2 times daily       Calcium-Magnesium-Vitamin D (CALCIUM 500 PO) Take 500 mg by mouth 2 times daily       POTASSIUM CHLORIDE ER PO Take 10 mEq by mouth daily       acetaminophen (TYLENOL) 325 MG tablet Take 650 mg by mouth every 4 hours (while awake)       albuterol (PROAIR HFA/PROVENTIL HFA/VENTOLIN HFA) 108 (90 BASE) MCG/ACT Inhaler Inhale 1-2 puffs into the lungs every 4 hours as needed       azaTHIOprine (IMURAN) 50 MG tablet Take 150 mg by mouth daily       enoxaparin (LOVENOX) 40 MG/0.4ML injection Inject 40 mg Subcutaneous daily For 16 days.       melatonin 3 MG tablet Take 3 mg by mouth At Bedtime        omeprazole (PRILOSEC) 20 MG CR capsule Take 20 mg by mouth 2 times daily Do not crush       sertraline (ZOLOFT) 50 MG tablet Take 50 mg by mouth daily       levothyroxine (SYNTHROID/LEVOTHROID) 50 MCG tablet Take 50 mcg by mouth daily       METOPROLOL SUCCINATE ER PO Take 12.5 mg by mouth daily Hold for SBP<100 or HR < 60       cholecalciferol (VITAMIN D) 1000 UNIT tablet Take 1 tablet by mouth daily. 100  "tablet      Medications reviewed:  Medications reconciled to facility chart and changes were made to reflect current medications as identified as above med list. Below are the changes that were made:   Medications stopped since last EPIC medication reconciliation:   There are no discontinued medications.    Medications started since last [facility] medication reconciliation:  No orders of the defined types were placed in this encounter.    REVIEW OF SYSTEMS:  10 point ROS of systems including Constitutional, Eyes, Respiratory, Cardiovascular, Gastroenterology, Genitourinary, Integumentary, Muscularskeletal, Psychiatric were all negative except for pertinent positives noted in my HPI.    Physical Exam:  /85  Pulse 74  Temp 97.3  F (36.3  C)  Resp 18  Ht 5' 5\" (1.651 m)  Wt 117 lb (53.1 kg)  SpO2 99%  BMI 19.47 kg/m2  GENERAL APPEARANCE:  Alert, in no distress  ENT:  Mouth and posterior oropharynx normal, moist mucous membranes, [place]  EYES:  EOM normal, conjunctiva and lids normal  NECK:  No adenopathy,masses or thyromegaly  RESP:  respiratory effort and palpation of chest normal, lungs clear to auscultation , no respiratory distress  CV:  Palpation and auscultation of heart done , regular rate and rhythm, no murmur, no edema, +2 pedal pulses  ABDOMEN:  soft, nontender, no hepatosplenomegaly or other masses  M/S:  wheelchair, wearing TLSO.REN with good strength,  no joint inflammation  SKIN:  right hip incision with steri strips clean,dry, intact, no erythema. No rashes or open areas  PSYCH:  oriented to self, situation, daughter,  insight and judgement impaired, memory impaired    Recent Labs:   Lab Results   Component Value Date    WBC 9.9 08/17/2017     Lab Results   Component Value Date    RBC 3.96 08/17/2017     Lab Results   Component Value Date    HGB 12.4 08/17/2017     Lab Results   Component Value Date    HCT 37.8 08/17/2017     Lab Results   Component Value Date    MCV 96 08/17/2017     Lab Results "   Component Value Date    MCH 31.3 08/17/2017     Lab Results   Component Value Date    MCHC 32.8 08/17/2017     Lab Results   Component Value Date    RDW 13.9 08/17/2017     Lab Results   Component Value Date     08/17/2017     Last Basic Metabolic Panel:  Lab Results   Component Value Date     08/15/2017      Lab Results   Component Value Date    POTASSIUM 3.4 08/17/2017     Lab Results   Component Value Date    CHLORIDE 101 08/15/2017     Lab Results   Component Value Date    MICHELLE 9.8 08/15/2017     Lab Results   Component Value Date    CO2 25 08/15/2017     Lab Results   Component Value Date    BUN 14 08/15/2017     Lab Results   Component Value Date    CR 0.47 08/15/2017     Lab Results   Component Value Date    GLC 72 08/15/2017     ASSESSMENT / PLAN:  (S72.001D) Closed fracture of neck of right femur with routine healing  (primary encounter diagnosis)  (S32.591D) Fracture of right inferior pubic ramus, with routine healing, subsequent encounter  (S32.019D) Closed fracture of first lumbar vertebra with routine healing, unspecified fracture morphology, subsequent encounter  Comment: pain controlled. Improved mobility  Plan: continue tylenol. Continue lovenox through 8/30/2017. TLSO when out of bed or HOB >30 degrees. Ortho and Neurosurgery follow up as scheduled.     (I50.22) Chronic systolic congestive heart failure (H)  Comment: compensated. Not on diuretic and previously not on metoprolol. Mild hypotension.   Plan: decrease metoprolol to 12.5 mg daily and discontinue if VSS. Monitor VS. Daily weight.     (R13.12) Oropharyngeal dysphagia  (J18.9) Pneumonia due to infectious organism, unspecified laterality, unspecified part of lung  Comment: infection resolved and no signs of aspiration. Tolerating mechanical soft diet.   Plan: continue SPEECH THERAPY, advance diet as tolerated.     (R41.89) Cognitive impairment  Comment: improved, not back to baseline  Plan: supportive care. Continue SPEECH  THERAPY for higher level cognitive assessment.     (Z71.89) Hypokalemia  Comment: mild, no GI losses and not on diuretic. Possibly nutritional  Plan: continue KCl. BMP    (R53.81) Physical deconditioning  Comment: progressing in therapies  Plan: continue PHYSICAL THERAPY/OT. Disposition unclear at this time; may no longer be safe to live alone.         Electronically signed by  HADLEY Parra CNP

## 2017-08-24 ENCOUNTER — DOCUMENTATION ONLY (OUTPATIENT)
Dept: OTHER | Facility: CLINIC | Age: 82
End: 2017-08-24

## 2017-08-28 ENCOUNTER — TRANSFERRED RECORDS (OUTPATIENT)
Dept: HEALTH INFORMATION MANAGEMENT | Facility: CLINIC | Age: 82
End: 2017-08-28

## 2017-08-28 LAB
ANION GAP SERPL CALCULATED.3IONS-SCNC: 6 MMOL/L (ref 3–14)
BUN SERPL-MCNC: 15 MG/DL (ref 7–30)
CALCIUM SERPL-MCNC: 9.9 MG/DL (ref 8.5–10.1)
CHLORIDE SERPLBLD-SCNC: 104 MMOL/L (ref 94–109)
CO2 SERPL-SCNC: 29 MMOL/L (ref 20–32)
CREAT SERPL-MCNC: 0.57 MG/DL (ref 0.52–1.04)
GFR SERPL CREATININE-BSD FRML MDRD: >90 ML/MIN/1.73M2
GLUCOSE SERPL-MCNC: 91 MG/DL (ref 70–99)
POTASSIUM SERPL-SCNC: 3.7 MMOL/L (ref 3.4–5.3)
SODIUM SERPL-SCNC: 139 MMOL/L (ref 133–144)

## 2017-08-29 ENCOUNTER — DOCUMENTATION ONLY (OUTPATIENT)
Dept: OTHER | Facility: CLINIC | Age: 82
End: 2017-08-29

## 2017-08-30 ENCOUNTER — NURSING HOME VISIT (OUTPATIENT)
Dept: GERIATRICS | Facility: CLINIC | Age: 82
End: 2017-08-30
Payer: COMMERCIAL

## 2017-08-30 VITALS
HEIGHT: 65 IN | HEART RATE: 89 BPM | BODY MASS INDEX: 18.96 KG/M2 | OXYGEN SATURATION: 96 % | RESPIRATION RATE: 16 BRPM | WEIGHT: 113.8 LBS | SYSTOLIC BLOOD PRESSURE: 104 MMHG | TEMPERATURE: 97.3 F | DIASTOLIC BLOOD PRESSURE: 62 MMHG

## 2017-08-30 DIAGNOSIS — S72.001D CLOSED FRACTURE OF NECK OF RIGHT FEMUR WITH ROUTINE HEALING: Primary | ICD-10-CM

## 2017-08-30 DIAGNOSIS — M30.0 PAN (POLYARTERITIS NODOSA) (H): ICD-10-CM

## 2017-08-30 DIAGNOSIS — I50.22 CHRONIC SYSTOLIC CONGESTIVE HEART FAILURE (H): ICD-10-CM

## 2017-08-30 DIAGNOSIS — R41.89 COGNITIVE IMPAIRMENT: ICD-10-CM

## 2017-08-30 DIAGNOSIS — S32.019D CLOSED FRACTURE OF FIRST LUMBAR VERTEBRA WITH ROUTINE HEALING, UNSPECIFIED FRACTURE MORPHOLOGY, SUBSEQUENT ENCOUNTER: ICD-10-CM

## 2017-08-30 DIAGNOSIS — F32.A DEPRESSION, UNSPECIFIED DEPRESSION TYPE: ICD-10-CM

## 2017-08-30 DIAGNOSIS — R13.12 OROPHARYNGEAL DYSPHAGIA: ICD-10-CM

## 2017-08-30 DIAGNOSIS — E87.6 HYPOKALEMIA: ICD-10-CM

## 2017-08-30 DIAGNOSIS — R53.81 PHYSICAL DECONDITIONING: ICD-10-CM

## 2017-08-30 DIAGNOSIS — S32.591D: ICD-10-CM

## 2017-08-30 PROCEDURE — 99309 SBSQ NF CARE MODERATE MDM 30: CPT | Performed by: NURSE PRACTITIONER

## 2017-08-30 NOTE — PROGRESS NOTES
[place] [facility]    Chief Complaint   Patient presents with     RECHECK       HPI:    Rayne Mabry is a 87 year old  (4/26/1930), who is being seen today for an episodic care visit at [place] [facility].  HPI information obtained from: facility chart records, facility staff, patient report and [facility] chart review.  She came to this facility  for short term rehab and medical management 8/11/2017 [place] [facility] [place] resulting in right femur fracture, right inferior pubic ramus fracture and L1 vertebral fracture. She was disoriented in the ED. CT head negative for acute finding. She underwent right LEXIS on 7/27/2017. Lovenox started for DVT prophylaxis. She is WBAT. Neurosurgery recommended TLSO for L1 fracture.   On hospital admission, she was hypoxic with leukocytosis. CT chest showed consolidative opacities and she received azithromycin and ceftriaxone for suspected pneumonia vs reactivation of mycobacterium avium complex pneumonia. ID consulted and felt low likelihood of MAC reactivation.   Hospital course complicated by post op delirium with hallucinations that improved after stopping all sedating meds. Zoloft was decreased per Psychiatry. She had an episode of Afib with RVR and probable systolic CHF exacerbation that improved with diuresis. HR spontaneously returned to NSR.    Today's concern is:     Closed fracture of neck of right femur with routine healing  Fracture of right inferior pubic ramus, with routine healing, subsequent encounter  Closed fracture of first lumbar vertebra with routine healing, unspecified fracture morphology, subsequent encounter-reports pain has improved and is mostly in her mid low back. Relieved with tylenol.   PAN (polyarteritis nodosa) (H)  Chronic systolic congestive heart failure (H)-not on diuretic. Denies cough, shortness of breath or chest pain.   Hypokalemia-no diarrhea or vomiting.   Depression, unspecified depression type-talked with her daughter  "Darlene Mccloud who feels that her mother is becoming more depressed, in part due to increased awareness re: her cognitive deficits.   Oropharyngeal dysphagia-tolerating mechanical soft diet. Speech therapist reports some improvement in pocketing food. Weight down 7 lbs from admission.   Cognitive impairment-more alert today. Responds to questions appropriately, but conversation tangential. Poor historian. SLUMS 14/30. CPT in progress. States that her thinking is \"all over the place.\" Daughter reports that her mother was never analytical and would not have done well on some of the cognitive testing when she was intact.   Physical deconditioning-ambulating 200 ft with walker and stand by to contact guard assist.     BP's: 104/62, 101/67, 122/59  HR: 71- 89      ALLERGIES: Zolpidem  Past Medical, Surgical, Family and Social History reviewed and updated in EPIC.    Current Outpatient Prescriptions   Medication Sig Dispense Refill     senna-docusate (SENOKOT-S;PERICOLACE) 8.6-50 MG per tablet Take 1 tablet by mouth 2 times daily       Calcium-Magnesium-Vitamin D (CALCIUM 500 PO) Take 500 mg by mouth 2 times daily       POTASSIUM CHLORIDE ER PO Take 10 mEq by mouth daily       acetaminophen (TYLENOL) 325 MG tablet Take 650 mg by mouth every 4 hours (while awake)       albuterol (PROAIR HFA/PROVENTIL HFA/VENTOLIN HFA) 108 (90 BASE) MCG/ACT Inhaler Inhale 1-2 puffs into the lungs every 4 hours as needed       azaTHIOprine (IMURAN) 50 MG tablet Take 150 mg by mouth daily       melatonin 3 MG tablet Take 3 mg by mouth At Bedtime        omeprazole (PRILOSEC) 20 MG CR capsule Take 20 mg by mouth 2 times daily Do not crush       sertraline (ZOLOFT) 50 MG tablet Take 50 mg by mouth daily       levothyroxine (SYNTHROID/LEVOTHROID) 50 MCG tablet Take 50 mcg by mouth daily       METOPROLOL SUCCINATE ER PO Take 12.5 mg by mouth daily Hold for SBP<100 or HR < 60       cholecalciferol (VITAMIN D) 1000 UNIT tablet Take 1 tablet by mouth " "daily. 100 tablet      Medications reviewed:  Medications reconciled to facility chart and changes were made to reflect current medications as identified as above med list. Below are the changes that were made:   Medications stopped since last EPIC medication reconciliation:   There are no discontinued medications.    Medications started since last [facility] medication reconciliation:  No orders of the defined types were placed in this encounter.    REVIEW OF SYSTEMS:  10 point ROS of systems including Constitutional, Eyes, Respiratory, Cardiovascular, Gastroenterology, Genitourinary, Integumentary, Muscularskeletal, Psychiatric were all negative except for pertinent positives noted in my HPI.    Physical Exam:  /62  Pulse 89  Temp 97.3  F (36.3  C)  Resp 16  Ht 5' 5\" (1.651 m)  Wt 113 lb 12.8 oz (51.6 kg)  SpO2 96%  BMI 18.94 kg/m2  GENERAL APPEARANCE:  Alert, in no distress  ENT:  Mouth and posterior oropharynx normal, moist mucous membranes, [place]  EYES:  EOM normal, conjunctiva and lids normal  NECK:  No adenopathy,masses or thyromegaly  RESP:  respiratory effort and palpation of chest normal, lungs clear to auscultation , no respiratory distress  CV:  Palpation and auscultation of heart done , regular rate and rhythm, no murmur, no edema, +2 pedal pulses  ABDOMEN:  soft, nontender, no hepatosplenomegaly or other masses  M/S:  wheelchair, wearing TLSO.REN with good strength,  no joint inflammation  SKIN:  right hip incision with  clean,dry, intact, no erythema. No rashes or open areas  PSYCH:  oriented to self, situation,  insight and judgement impaired, memory impaired    Recent Labs:    Last Basic Metabolic Panel:  Lab Results   Component Value Date     08/28/2017      Lab Results   Component Value Date    POTASSIUM 3.7 08/28/2017     Lab Results   Component Value Date    CHLORIDE 104 08/28/2017     Lab Results   Component Value Date    MICHELLE 9.9 08/28/2017     Lab Results   Component Value Date    " CO2 29 08/28/2017     Lab Results   Component Value Date    BUN 15 08/28/2017     Lab Results   Component Value Date    CR 0.57 08/28/2017     Lab Results   Component Value Date    GLC 91 08/28/2017     Lab Results   Component Value Date    WBC 9.9 08/17/2017     Lab Results   Component Value Date    RBC 3.96 08/17/2017     Lab Results   Component Value Date    HGB 12.4 08/17/2017     Lab Results   Component Value Date    HCT 37.8 08/17/2017     Lab Results   Component Value Date    MCV 96 08/17/2017     Lab Results   Component Value Date    MCH 31.3 08/17/2017     Lab Results   Component Value Date    MCHC 32.8 08/17/2017     Lab Results   Component Value Date    RDW 13.9 08/17/2017     Lab Results   Component Value Date     08/17/2017     ASSESSMENT / PLAN:  (S72.001D) Closed fracture of neck of right femur with routine healing  (primary encounter diagnosis)  (S32.591D) Fracture of right inferior pubic ramus, with routine healing, subsequent encounter  (S32.019D) Closed fracture of first lumbar vertebra with routine healing, unspecified fracture morphology, subsequent encounter  Comment: pain continues to improve  Plan: last dose lovenox today. Continue tylenol. TLSO when out of bed or HOB >30 degrees. Ortho and Neurosurgery follow up as scheduled.     (M30.0) PAN (polyarteritis nodosa) (H)  Comment: long standing with no acute issues  Plan: continue Imuran    (I50.22) Chronic systolic congestive heart failure (H)  Comment: compensated. Not on diuretic  Plan:  monitor fluid status. Daily weight.     (E87.6) Hypokalemia  Comment: replaced  Plan: continue KCl. Follow BMP    (F32.9) Depression, unspecified depression type  Comment: daughter feels mood has worsened on lower dose zoloft  Plan: increase zoloft back to usual dose of 100 mg. Closely monitor for sedation or increased confusion. Refer to onsite Psych.     (R13.12) Oropharyngeal dysphagia  Comment: improved  Plan: SPEECH THERAPY, advance diet as  tolerated    (R41.89) Cognitive impairment  Comment: delirium cleared. Cognition has improved from tcu admission, but is not back to baseline per family report. Suspect progression of underlying dementia. No signs of acute neurological issues.   Plan: continue SPEECH THERAPY for higher level cognitive tasks. Supportive care.     (R53.81) Physical deconditioning  Comment: progressing in therapies.   Plan: continue PHYSICAL THERAPY/OT. Family hoping that she will be able to return home, but are realizing that she requires 24 hr care.         Electronically signed by  HADLEY Parra CNP

## 2017-09-06 ENCOUNTER — NURSING HOME VISIT (OUTPATIENT)
Dept: GERIATRICS | Facility: CLINIC | Age: 82
End: 2017-09-06
Payer: COMMERCIAL

## 2017-09-06 VITALS
RESPIRATION RATE: 21 BRPM | DIASTOLIC BLOOD PRESSURE: 77 MMHG | BODY MASS INDEX: 19.37 KG/M2 | WEIGHT: 116.4 LBS | SYSTOLIC BLOOD PRESSURE: 120 MMHG | HEART RATE: 67 BPM | OXYGEN SATURATION: 94 % | TEMPERATURE: 96.2 F
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
## 2017-09-06 DIAGNOSIS — R13.12 OROPHARYNGEAL DYSPHAGIA: ICD-10-CM

## 2017-09-06 DIAGNOSIS — S32.019D CLOSED FRACTURE OF FIRST LUMBAR VERTEBRA WITH ROUTINE HEALING, UNSPECIFIED FRACTURE MORPHOLOGY, SUBSEQUENT ENCOUNTER: ICD-10-CM

## 2017-09-06 DIAGNOSIS — R41.89 COGNITIVE IMPAIRMENT: ICD-10-CM

## 2017-09-06 DIAGNOSIS — J18.9 PNEUMONIA DUE TO INFECTIOUS ORGANISM, UNSPECIFIED LATERALITY, UNSPECIFIED PART OF LUNG: ICD-10-CM

## 2017-09-06 DIAGNOSIS — M30.0 PAN (POLYARTERITIS NODOSA) (H): ICD-10-CM

## 2017-09-06 DIAGNOSIS — I50.22 CHRONIC SYSTOLIC CONGESTIVE HEART FAILURE (H): ICD-10-CM

## 2017-09-06 DIAGNOSIS — S72.001D CLOSED FRACTURE OF NECK OF RIGHT FEMUR WITH ROUTINE HEALING: ICD-10-CM

## 2017-09-06 DIAGNOSIS — R19.8 ALTERNATING CONSTIPATION AND DIARRHEA: Primary | ICD-10-CM

## 2017-09-06 DIAGNOSIS — D62 ANEMIA DUE TO BLOOD LOSS, ACUTE: ICD-10-CM

## 2017-09-06 PROCEDURE — 99310 SBSQ NF CARE HIGH MDM 45: CPT | Performed by: NURSE PRACTITIONER

## 2017-09-06 NOTE — PROGRESS NOTES
[place] [facility]    Chief Complaint   Patient presents with     RECHECK       HPI:    Rayne Mabry is a 87 year old  (4/26/1930), who is being [facility]  HPI information obtained from: facility chart records, facility staff, patient report and [facility] chart review.Today's concern is:    Alternating constipation and diarrhea  Patient c/o stools being soft to loose, feels she is getting too much laxative. Discussed cutting back on Senna S. No n/v and no abdominal pain. No fevers.     Closed fracture of neck of right femur with routine healing  Closed fracture of first lumbar vertebra with routine healing, unspecified fracture morphology, subsequent encounter  Fall, subsequent encounter  87 year old  (4/26/1930) admitted [place] [facility] [place] [facility] [place] 08/11/2017).  She was hospitalized after a fall resulting in right femur fracture, right inferior pubic ramus fracture and L1 vertebral fracture. Ortho consulted and she underwent right LEXIS on 7/27/2017. Tolerated the procedure well. Lovenox started for DVT prophylaxis. She is WBAT. Neurosurgery recommended TLSO for L1 fracture.   --continues therapies, TLSO when out of bed. Denies pain at this time. Recommended f/u with ortho six weeks post hospital discharge and 1 month f/u with neurosurgery clinic.     PAN (polyarteritis nodosa) (H)  Remains on Imuran as PTA.     Chronic systolic congestive heart failure (H)  She had an episode of Afib with RVR and probable systolic CHF exacerbation that improved with diuresis.    --Recent BP Range: /55-77, HR Range: 67-88 bpm, Wt Range: 113.8lbs (8/23) - 120.8lbs (8/11). LSC and no s/s fluid overload. Does not use diuretics.   Lab Results   Component Value Date    CR 0.57 08/28/2017    CR 0.47 08/15/2017     Lab Results   Component Value Date    BUN 15 08/28/2017    BUN 14 08/15/2017     Lab Results   Component  Value Date    POTASSIUM 3.7 08/28/2017    POTASSIUM 3.4 08/17/2017       Anemia due to blood loss, acute  Stable on last check.   Lab Results   Component Value Date    HGB 12.4 08/17/2017    HGB 12.5 08/15/2017         Pneumonia due to infectious organism, unspecified laterality, unspecified part of lung  On hospital admission, she was hypoxic with leukocytosis. CT chest showed consolidative opacities and she received azithromycin and ceftriaxone for suspected pneumonia vs reactivation of mycobacterium avium complex pneumonia. ID consulted and felt low likelihood of MAC reactivation.   --at TCU respiratory status stable. O2 sats 94% room air and no cough.     Cognitive impairment  Recent SLUMS 14/30. Family felt worsened mood as patient realizes own cognitive limitations. Order in place for psych consult at facility. ADDENDUM: please see addendum and correction below    Oropharyngeal dysphagia  Patient eats a mechanical soft diet and has had no issues with aspiration at this TCU stay.     ALLERGIES: Zolpidem  Past Medical, Surgical, Family and Social History reviewed and updated in [place].    Current Outpatient Prescriptions   Medication Sig Dispense Refill     senna-docusate (SENOKOT-S;PERICOLACE) 8.6-50 MG per tablet Take 1 tablet by mouth daily        Calcium-Magnesium-Vitamin D (CALCIUM 500 PO) Take 500 mg by mouth 2 times daily       POTASSIUM CHLORIDE ER PO Take 10 mEq by mouth daily       acetaminophen (TYLENOL) 325 MG tablet Take 650 mg by mouth every 4 hours (while awake)       albuterol (PROAIR HFA/PROVENTIL HFA/VENTOLIN HFA) 108 (90 BASE) MCG/ACT Inhaler Inhale 1-2 puffs into the lungs every 4 hours as needed       azaTHIOprine (IMURAN) 50 MG tablet Take 150 mg by mouth daily       melatonin 3 MG tablet Take 3 mg by mouth At Bedtime        omeprazole (PRILOSEC) 20 MG CR capsule Take 20 mg by mouth 2 times daily Do not crush       sertraline (ZOLOFT) 50 MG tablet Take 100 mg by mouth daily         levothyroxine (SYNTHROID/LEVOTHROID) 50 MCG tablet Take 50 mcg by mouth daily       cholecalciferol (VITAMIN D) 1000 UNIT tablet Take 1 tablet by mouth daily. 100 tablet      Medications reviewed:  Medications reconciled to facility chart and changes were made to reflect current medications as identified as above med list. Below are the changes that were made:   Medications stopped since last EPIC medication reconciliation:   Medications Discontinued During This Encounter   Medication Reason     METOPROLOL SUCCINATE ER PO Medication Reconciliation Clean Up       Medications started since last [facility] medication reconciliation:  No orders of the defined types were placed in this encounter.    REVIEW OF SYSTEMS:  10 point ROS of systems including Constitutional, Eyes, Respiratory, Cardiovascular, Gastroenterology, Genitourinary, Integumentary, Musculoskeletal, Psychiatric were all negative except for pertinent positives noted in my HPI.    Physical Exam:  /77  Pulse 67  Temp 96.2  F (35.7  C)  Resp 21  Wt 116 lb 6.4 oz (52.8 kg)  SpO2 94%  BMI 19.37 kg/m2  GENERAL APPEARANCE:  Alert, in no distress, pleasant, cooperative, oriented x self, place and recent events.   EYES:  EOM, lids, pupils and irises normal, sclera clear and conjunctiva normal, no discharge or mattering on lids or lashes noted  ENT:  Mouth normal, moist mucous membranes, nose normal without drainage or crusting, external ears without lesions, hearing acuity intact  NECK:  Nontender, supple, symmetrical; no adenopathy, masses or thyromegaly, trachea midline  RESP:  respiratory effort and palpation of chest normal, no chest wall tenderness, no respiratory distress, Lung sounds clear, patient is on room air  CV:  Palpation and auscultation of heart done, rate and rhythm controlled and regular, no murmur, no rub or gallop. Edema none pitting bilateral lower extremities.   ABDOMEN:  normal bowel sounds, soft, nontender, no grimacing or guarding  with palpation, no hepatosplenomegaly or other masses  M/S:   Gait and station unsafe without assistance, Digits and nails normal, no tenderness or swelling of the joints; able to move all extremities. TLSO brace in place  SKIN:  Inspection and palpation of skin and subcutaneous tissue: skin on extremities warm, dry and intact without rashes  NEURO: cranial nerves 2-12 grossly intact, no facial asymmetry, no speech deficits and able to follow directions, moves all extremities symmetrically  PSYCH:  insight and judgement impaired, memory forgetful, affect and mood normal     Recent Labs:    CBC RESULTS:   Recent Labs   Lab Test 08/17/17 08/15/17   WBC  9.9  13.0*   RBC  3.96  4.00   HGB  12.4  12.5   HCT  37.8  38.4   MCV  96  96   MCH  31.3  31.3   MCHC  32.8  32.6   RDW  13.9  13.8   PLT  515*  564*       Last Basic Metabolic Panel:  Recent Labs   Lab Test 08/28/17 08/17/17 08/15/17   NA  139   --   137   POTASSIUM  3.7  3.4  3.3*   CHLORIDE  104   --   101   MICHELLE  9.9   --   9.8   CO2  29   --   25   BUN  15   --   14   CR  0.57   --   0.47*   GLC  91   --   72       Assessment/Plan:    Diagnosis Comments   1. Alternating constipation and diarrhea  New complaint - feels too much laxative, will cut back dose.    2. Closed fracture of neck of right femur with routine healing  Healing, ongoing therapies and pain meds.    3. Closed fracture of first lumbar vertebra with routine healing, unspecified fracture morphology, subsequent encounter  As above. F/U with ortho, neuro as ordered   4. Fall, subsequent encounter  Here for therapies, monitor for safety   5. PAN (polyarteritis nodosa) (H)  Chronic, continue home meds. Monitor.    6. Chronic systolic congestive heart failure (H)  Chronic, stable and fluid balanced. Update provider with edema, weight gain or respiratory symptoms.    7. Anemia due to blood loss, acute  Resolved. Hgb stable. F/U HGB PRN   8. Pneumonia due to infectious organism, unspecified laterality,  unspecified part of lung  Resolved. Monitor.    9. Cognitive impairment  Chronic. Monitor for safety. Psych referral made last week due to depression as patient recognizes cognitive decline.   ADDENDUM: see updated note below re: cognitive impairment and depression   10. Oropharyngeal dysphagia  Chronic, no issues. Monitor and continue mechanical soft diet.          Orders:  Decrease Senna S to 1 tab PO daily. Staff to update provider if not effective.     Total time spent with patient visit at the [facility] was 35 min including patient visit and review of past records. Greater than 50% of total time spent with counseling and coordinating care due to review of patient status, history, POC and current concerns    Electronically signed by  HADLEY Porter CNP    ADDENDUM  On 9/7 NP received a call from patient's daughter Darlene who had requests and questions about patient's medications and above provider progress note.   We discussed making both Senna S and tylenol PRN only and stopping scheduled doses.     Also, to clarify and update above notation regarding patient's cognitive status and depression:    Family do not feel that cognitive impairment is noted by patient and contributing to depression. Daughter feels that as patient is getting to cognitive baseline she may become more aware of physical limitations. Daughter also feels that patient is not more depressed. She simply wanted to have Zoloft increased back to original dose because patient has taken it for some time and was stable on this dose.     Electronically signed by HADLEY Porter GNP

## 2017-09-08 ENCOUNTER — NURSING HOME VISIT (OUTPATIENT)
Dept: GERIATRICS | Facility: CLINIC | Age: 82
End: 2017-09-08
Payer: COMMERCIAL

## 2017-09-08 VITALS
RESPIRATION RATE: 21 BRPM | OXYGEN SATURATION: 93 % | WEIGHT: 115 LBS | HEART RATE: 72 BPM | SYSTOLIC BLOOD PRESSURE: 114 MMHG | BODY MASS INDEX: 19.14 KG/M2 | DIASTOLIC BLOOD PRESSURE: 66 MMHG | TEMPERATURE: 96.2 F

## 2017-09-08 DIAGNOSIS — F32.A DEPRESSION, UNSPECIFIED DEPRESSION TYPE: Primary | ICD-10-CM

## 2017-09-08 DIAGNOSIS — R52 PAIN: ICD-10-CM

## 2017-09-08 DIAGNOSIS — K59.01 SLOW TRANSIT CONSTIPATION: ICD-10-CM

## 2017-09-08 DIAGNOSIS — R41.89 COGNITIVE IMPAIRMENT: ICD-10-CM

## 2017-09-08 PROCEDURE — 99309 SBSQ NF CARE MODERATE MDM 30: CPT | Performed by: NURSE PRACTITIONER

## 2017-09-08 NOTE — PROGRESS NOTES
[place] [facility]    Chief Complaint   Patient presents with     RECHECK       HPI:    Rayne Mabry is a 87 year old  (4/26/1930), who is being seen today for an episodic care visit at [place] [facility].    HPI information obtained from: facility chart records, hospital records and family report.    Today's concern is:  1. Depression, unspecified depression type    2. Cognitive impairment    3. Pain    4. Slow transit constipation      --This NP had spoken to patient's [facility] daughter Darlene at length on 9/7 and also had a conversation today re: family concern with medications, questions about orders, updated information re: prior progress notes and also some noted discrepancies between hospital notes and actual hospital orders.   --NP confirmed through chart review that hospital discharge orders in fact did recommend Lovenox for 19 more days after DC (family stated this was ordered differently in their hospital notes) and also that ortho f/u was scheduled for six weeks (again family stated their copy of progress note recommended shorter duration). In any case, patient will be seen by ortho next week for follow up.   --This NP did addend my progress note of 9/7 regarding depression and cognitive impairment to reflect information provided by family and state the following to clarify and update above notation regarding patient's cognitive status and depression:  Family do not feel that cognitive impairment is noted by patient and contributing to depression. Daughter feels that as patient is getting to cognitive baseline she may become more aware of physical limitations. Daughter also feels that patient is not more depressed. She simply wanted to have Zoloft increased back to original dose because patient has taken it for some time and was stable on this dose.   --We also discussed stopping Senna S and Tylenol and making both of these PRN. Spoke with patient Rayne today regarding these changes. She denies  constipation and has no pain this morning.   --Finally, updated family and patient that last dose of Metoprolol 12.5 mg was given on 8/30 and stopped afterwards.       REVIEW OF SYSTEMS:  4 point ROS including Respiratory, CV, GI and , other than that noted in the HPI,  is negative    OBJECTIVE:   /66  Pulse 72  Temp 96.2  F (35.7  C)  Resp 21  Wt 115 lb (52.2 kg)  SpO2 93%  BMI 19.14 kg/m2  Alert, in no distress  On room air, no dyspnea, no cough  No facial asymmetry, able to move all extremities  Forgetful, pleasant.     ASSESSMENT/PLAN/ORDERS:  1. Depression, unspecified depression type ; stable, meds per home routine   2. Cognitive impairment : family feel mentation clearing to baseline   3. Pain : no pain, tylenol changed to PRN   4. Slow transit constipation : resolved, changed Senna s to PRN       Total time spent with patient visit at the skilled nursing facility was 25 min including patient visit, review of past records and phone call to patient contact. Greater than 50% of total time spent with counseling and coordinating care due to review progress notes, med administration record, hospital discharge orders    Electronically signed by HADLEY Porter, GNP

## 2017-09-08 NOTE — Clinical Note
"Juan Diego - this family will be calling you next week. They disagreed with info both you and I had in our progress notes. They have asked that you addend your note from last week and I let them know you are back Monday. If you need more info give me a call next week - they found perceived \"discrepancies\" between hospital notes and hospital orders and our progress notes that they wanted us to account for.   Cinthya"

## 2017-09-13 ENCOUNTER — NURSING HOME VISIT (OUTPATIENT)
Dept: GERIATRICS | Facility: CLINIC | Age: 82
End: 2017-09-13
Payer: COMMERCIAL

## 2017-09-13 VITALS
TEMPERATURE: 98.5 F | BODY MASS INDEX: 18.8 KG/M2 | WEIGHT: 113 LBS | RESPIRATION RATE: 16 BRPM | HEART RATE: 91 BPM | OXYGEN SATURATION: 92 % | SYSTOLIC BLOOD PRESSURE: 126 MMHG | DIASTOLIC BLOOD PRESSURE: 76 MMHG

## 2017-09-13 DIAGNOSIS — D62 ANEMIA DUE TO BLOOD LOSS, ACUTE: ICD-10-CM

## 2017-09-13 DIAGNOSIS — R13.12 OROPHARYNGEAL DYSPHAGIA: ICD-10-CM

## 2017-09-13 DIAGNOSIS — R41.89 COGNITIVE IMPAIRMENT: ICD-10-CM

## 2017-09-13 DIAGNOSIS — S32.019D CLOSED FRACTURE OF FIRST LUMBAR VERTEBRA WITH ROUTINE HEALING, UNSPECIFIED FRACTURE MORPHOLOGY, SUBSEQUENT ENCOUNTER: ICD-10-CM

## 2017-09-13 DIAGNOSIS — R53.81 PHYSICAL DECONDITIONING: ICD-10-CM

## 2017-09-13 DIAGNOSIS — S72.001D CLOSED FRACTURE OF NECK OF RIGHT FEMUR WITH ROUTINE HEALING: Primary | ICD-10-CM

## 2017-09-13 DIAGNOSIS — S32.591D: ICD-10-CM

## 2017-09-13 DIAGNOSIS — E87.6 HYPOKALEMIA: ICD-10-CM

## 2017-09-13 PROCEDURE — 99309 SBSQ NF CARE MODERATE MDM 30: CPT | Performed by: NURSE PRACTITIONER

## 2017-09-13 NOTE — PROGRESS NOTES
[facility]    Chief Complaint   Patient presents with     RECHECK       HPI:    Rayne Mabry is a 87 year old  (4/26/1930), who is being seen today for an episodic care visit at [facility]. HPI information obtained from: facility chart records, facility staff, patient report and [facility] chart review.  She came to this facility  for short term rehab and medical management 8/11/2017 from [facility] after a fall resulting in right femur fracture, right inferior pubic ramus fracture and L1 vertebral fracture. She was disoriented in the ED. CT head negative for acute finding. She underwent right LEXIS on 7/27/2017.  Neurosurgery recommended TLSO for L1 fracture.   On hospital admission, she was hypoxic with leukocytosis. CT chest showed consolidative opacities and she received azithromycin and ceftriaxone for suspected pneumonia vs reactivation of mycobacterium avium complex pneumonia. ID consulted and felt low likelihood of MAC reactivation.   Hospital course complicated by post op delirium with hallucinations that improved after stopping all sedating meds. Zoloft was decreased per Psychiatry. She had an episode of Afib with RVR and probable systolic CHF exacerbation that improved with diuresis. HR spontaneously returned to NSR.    Today's concern is:     Closed fracture of neck of right femur with routine healing-denies pain of any type. Reports feeling well.   Fracture of right inferior pubic ramus, with routine healing, subsequent encounter  Closed fracture of first lumbar vertebra with routine healing, unspecified fracture morphology, subsequent encounter  Oropharyngeal dysphagia-appetite has improved.Currently on a trial of regular textures.  Cognitive impairment-SLUMS 14/30. CPT in progress, subsets range 3.5-4.5. She's more alert and talkative today. Responds to questions appropriately. Poor historian. Nurses and therapists report she is more confused and sleepy at times during the  day.   Anemia due to blood loss, acute  Hypokalemia-KCl started early in rehab stay.   Physical deconditioning-ambulating 200 ft with walker and contact guard to stand by assist. Requires assist of 1 with all cares.       BP's: 126/76, 132/66, 114/66  HR: 67-91    ALLERGIES: Zolpidem  Past Medical, Surgical, Family and Social History reviewed and updated in [facility].    Current Outpatient Prescriptions   Medication Sig Dispense Refill     senna-docusate (SENOKOT-S;PERICOLACE) 8.6-50 MG per tablet Take 1 tablet by mouth daily as needed        Calcium-Magnesium-Vitamin D (CALCIUM 500 PO) Take 500 mg by mouth 2 times daily       acetaminophen (TYLENOL) 325 MG tablet Take 650 mg by mouth every 4 hours as needed        albuterol (PROAIR HFA/PROVENTIL HFA/VENTOLIN HFA) 108 (90 BASE) MCG/ACT Inhaler Inhale 1-2 puffs into the lungs every 4 hours as needed       azaTHIOprine (IMURAN) 50 MG tablet Take 150 mg by mouth daily       melatonin 3 MG tablet Take 3 mg by mouth At Bedtime        omeprazole (PRILOSEC) 20 MG CR capsule Take 20 mg by mouth daily Do not crush       sertraline (ZOLOFT) 50 MG tablet Take 50 mg by mouth At Bedtime        levothyroxine (SYNTHROID/LEVOTHROID) 50 MCG tablet Take 50 mcg by mouth daily       cholecalciferol (VITAMIN D) 1000 UNIT tablet Take 1 tablet by mouth daily. 100 tablet      Zinc Oxide (SKIN PROTECTANT EX) Externally apply topically 2 times daily Apply to both feet       Medications reviewed:  Medications reconciled to facility chart and changes were made to reflect current medications as identified as above med list. Below are the changes that were made:   Medications stopped since last EPIC medication reconciliation:   There are no discontinued medications.    Medications started since last [facility] medication reconciliation:  No orders of the defined types were placed in this encounter.    REVIEW OF SYSTEMS:  10 point ROS of systems including Constitutional, Eyes, Respiratory, Cardiovascular,  Gastroenterology, Genitourinary, Integumentary, Muscularskeletal, Psychiatric were all negative except for pertinent positives noted in my HPI.    Physical Exam:  /76  Pulse 91  Temp 98.5  F (36.9  C)  Resp 16  Wt 113 lb (51.3 kg)  SpO2 92%  BMI 18.8 kg/m2  GENERAL APPEARANCE:  Alert, in no distress  ENT:  Mouth and posterior oropharynx normal, moist mucous membranes, [place]  EYES:  EOM normal, conjunctiva and lids normal  NECK:  No adenopathy,masses or thyromegaly  RESP:  respiratory effort and palpation of chest normal, lungs clear to auscultation , no respiratory distress  CV:  Palpation and auscultation of heart done , regular rate and rhythm, no murmur, no edema, +2 pedal pulses  ABDOMEN:  soft, nontender, no hepatosplenomegaly or other masses  M/S:  wheelchair, wearing TLSO.REN with good strength,  no joint inflammation  SKIN:  right hip incision healed. No rashes or open areas  PSYCH:  oriented to self, place, situation,  insight and judgement impaired, memory impaired    Recent Labs:    Last Basic Metabolic Panel:  Lab Results   Component Value Date     08/28/2017      Lab Results   Component Value Date    POTASSIUM 3.7 08/28/2017     Lab Results   Component Value Date    CHLORIDE 104 08/28/2017     Lab Results   Component Value Date    MICHELLE 9.9 08/28/2017     Lab Results   Component Value Date    CO2 29 08/28/2017     Lab Results   Component Value Date    BUN 15 08/28/2017     Lab Results   Component Value Date    CR 0.57 08/28/2017     Lab Results   Component Value Date    GLC 91 08/28/2017     Lab Results   Component Value Date    WBC 9.9 08/17/2017     Lab Results   Component Value Date    RBC 3.96 08/17/2017     Lab Results   Component Value Date    HGB 12.4 08/17/2017     Lab Results   Component Value Date    HCT 37.8 08/17/2017     Lab Results   Component Value Date    MCV 96 08/17/2017     Lab Results   Component Value Date    MCH 31.3 08/17/2017     Lab Results   Component Value Date     [facility] 32.8 08/17/2017     Lab Results   Component Value Date    RDW 13.9 08/17/2017     Lab Results   Component Value Date     08/17/2017       ASSESSMENT / PLAN:  (S72.001D) Closed fracture of neck of right femur with routine healing  (primary encounter diagnosis)  (S32.591D) Fracture of right inferior pubic ramus, with routine healing, subsequent encounter  Comment: pain and mobility improved.   Plan: continue tylenol Ortho follow up later today.     (S32.019D) Closed fracture of first lumbar vertebra with routine healing, unspecified fracture morphology, subsequent encounter  Comment: pain improved.   Plan: continue TLSO when out of bed or HOB >30 degrees. Continue tylenol. Neurosurgery follow up 10/20/2017.     (R13.12) Oropharyngeal dysphagia  Comment: no signs of aspiration. Improved  Plan: continue SPEECH THERAPY, advance diet as tolerated    (R41.89) Cognitive impairment  Comment: cognition improved since tcu admission, though not back to baseline. Probable underlying dementia. No longer safe to live alone.   Plan: supportive care    (D62) Anemia due to blood loss, acute  Comment: Hgb improved  Plan: CBC    (E87.6) Hypokalemia  Comment: replaced. May have been related to diuresis while inpatient and/or poor intake early in tcu stay.   Plan: discontinue KCl. Recheck BMP 9/18/2017.     (R53.81) Physical deconditioning  Comment: progressing in therapies  Plan: continue PHYSICAL THERAPY/OT. Disposition unclear at this time-will discuss with daughter.         Electronically signed by  HADLEY Parra CNP

## 2017-09-15 ENCOUNTER — NURSING HOME VISIT (OUTPATIENT)
Dept: GERIATRICS | Facility: CLINIC | Age: 82
End: 2017-09-15
Payer: COMMERCIAL

## 2017-09-15 VITALS
HEART RATE: 61 BPM | DIASTOLIC BLOOD PRESSURE: 61 MMHG | OXYGEN SATURATION: 96 % | RESPIRATION RATE: 16 BRPM | BODY MASS INDEX: 18.97 KG/M2 | WEIGHT: 114 LBS | TEMPERATURE: 97.1 F | SYSTOLIC BLOOD PRESSURE: 100 MMHG

## 2017-09-15 DIAGNOSIS — S32.591D: ICD-10-CM

## 2017-09-15 DIAGNOSIS — E87.6 HYPOKALEMIA: ICD-10-CM

## 2017-09-15 DIAGNOSIS — R13.12 OROPHARYNGEAL DYSPHAGIA: ICD-10-CM

## 2017-09-15 DIAGNOSIS — M30.0 PAN (POLYARTERITIS NODOSA) (H): ICD-10-CM

## 2017-09-15 DIAGNOSIS — R53.81 PHYSICAL DECONDITIONING: ICD-10-CM

## 2017-09-15 DIAGNOSIS — R41.89 COGNITIVE IMPAIRMENT: ICD-10-CM

## 2017-09-15 DIAGNOSIS — S72.001D CLOSED FRACTURE OF NECK OF RIGHT FEMUR WITH ROUTINE HEALING: Primary | ICD-10-CM

## 2017-09-15 DIAGNOSIS — K21.9 GASTROESOPHAGEAL REFLUX DISEASE WITHOUT ESOPHAGITIS: ICD-10-CM

## 2017-09-15 DIAGNOSIS — F32.A DEPRESSION, UNSPECIFIED DEPRESSION TYPE: ICD-10-CM

## 2017-09-15 DIAGNOSIS — S32.019D CLOSED FRACTURE OF FIRST LUMBAR VERTEBRA WITH ROUTINE HEALING, UNSPECIFIED FRACTURE MORPHOLOGY, SUBSEQUENT ENCOUNTER: ICD-10-CM

## 2017-09-15 PROCEDURE — 99310 SBSQ NF CARE HIGH MDM 45: CPT | Performed by: NURSE PRACTITIONER

## 2017-09-15 NOTE — PROGRESS NOTES
[facility]    Chief Complaint   Patient presents with     RECHECK       HPI:    Rayne Mabry is a 87 year old  (4/26/1930), who is being seen today for an episodic care visit at [facility].  HPI information obtained from: facility chart records, facility staff, patient report and [facility] chart review.  She came to this facility  for short term rehab and medical management 8/11/2017 from [facility] after a fall resulting in right femur fracture, right inferior pubic ramus fracture and L1 vertebral fracture. She was disoriented in the ED. CT head negative for acute finding. She underwent right LEXIS on 7/27/2017. Received lovenox for DVT prophylaxis. Neurosurgery recommended TLSO for L1 fracture.   On hospital admission, she was hypoxic with leukocytosis. CT chest showed consolidative opacities and she received azithromycin and ceftriaxone for suspected pneumonia vs reactivation of mycobacterium avium complex pneumonia. ID consulted and felt low likelihood of MAC reactivation.   Hospital course complicated by post op delirium with hallucinations that improved after stopping all sedating meds. Zoloft was decreased per Psychiatry. She had an episode of Afib with RVR and probable systolic CHF exacerbation that improved with diuresis. HR spontaneously returned to NSR.    Today's concern is:     Closed fracture of neck of right femur with routine healing-reports feeling well with minimal pain. Saw Ortho 9/13/2017.   Closed fracture of first lumbar vertebra with routine healing, unspecified fracture morphology, subsequent encounter-wearing TLSO  Fracture of right inferior pubic ramus, with routine healing, subsequent encounter  PAN (polyarteritis nodosa) (H)-on Imuran  Depression, unspecified depression type-zoloft recently increased and daughter Darlene Mccloud reports that she has noticed some increase in confusion and sleepiness. Patient reports mood is good and she's sleeping well.    Gastroesophageal reflux disease without esophagitis-denies GI symptoms.   Oropharyngeal dysphagia-currently trying regular diet which seems to be going well. Speech therapist reports pocketing of food and difficulty remembering swallow techniques.   Cognitive impairment-SLUMS 14/30. Alert and talkative today. Poor historian.   Hypokalemia-KCl started early in tcu stay for K 3.3. Discontinued earlier this week.  Transient afib-Ortho note recommends Cardiology eval for afib. Patient has been in NSR since tcu admission. No previous history of afib.   Physical deconditioning-ambulating >200 ft with walker and stand by to contact guard assist. Requires assist of 1 with transfers and cares.     BP's: 100/61, 126/76, 132/66  HR: 61-91    ALLERGIES: Zolpidem  Past Medical, Surgical, Family and Social History reviewed and updated in EPIC.    Current Outpatient Prescriptions   Medication Sig Dispense Refill     Zinc Oxide (SKIN PROTECTANT EX) Externally apply topically 2 times daily Apply to both feet       senna-docusate (SENOKOT-S;PERICOLACE) 8.6-50 MG per tablet Take 1 tablet by mouth daily as needed        Calcium-Magnesium-Vitamin D (CALCIUM 500 PO) Take 500 mg by mouth 2 times daily       acetaminophen (TYLENOL) 325 MG tablet Take 650 mg by mouth every 4 hours as needed        albuterol (PROAIR HFA/PROVENTIL HFA/VENTOLIN HFA) 108 (90 BASE) MCG/ACT Inhaler Inhale 1-2 puffs into the lungs every 4 hours as needed       azaTHIOprine (IMURAN) 50 MG tablet Take 150 mg by mouth daily       melatonin 3 MG tablet Take 3 mg by mouth At Bedtime        omeprazole (PRILOSEC) 20 MG CR capsule Take 20 mg by mouth daily Do not crush       levothyroxine (SYNTHROID/LEVOTHROID) 50 MCG tablet Take 50 mcg by mouth daily       cholecalciferol (VITAMIN D) 1000 UNIT tablet Take 1 tablet by mouth daily. 100 tablet      POTASSIUM CHLORIDE ER PO Take 10 mEq by mouth daily       sertraline (ZOLOFT) 50 MG tablet Take 50 mg by mouth At Bedtime         Medications reviewed:  Medications reconciled to facility chart and changes were made to reflect current medications as identified as above med list. Below are the changes that were made:   Medications stopped since last EPIC medication reconciliation:   Medications Discontinued During This Encounter   Medication Reason     POTASSIUM CHLORIDE ER PO Medication Reconciliation Clean Up       Medications started since last [facility] medication reconciliation:  Orders Placed This Encounter   Medications     Zinc Oxide (SKIN PROTECTANT EX)     Sig: Externally apply topically 2 times daily Apply to both feet       REVIEW OF SYSTEMS:  10 point ROS of systems including Constitutional, Eyes, Respiratory, Cardiovascular, Gastroenterology, Genitourinary, Integumentary, Muscularskeletal, Psychiatric were all negative except for pertinent positives noted in my HPI.    Physical Exam:  /61  Pulse 61  Temp 97.1  F (36.2  C)  Resp 16  Wt 114 lb (51.7 kg)  SpO2 96%  BMI 18.97 kg/m2  GENERAL APPEARANCE:  Alert, in no distress  ENT:  Mouth and posterior oropharynx normal, moist mucous membranes, [place]  EYES:  EOM normal, conjunctiva and lids normal  NECK:  No adenopathy,masses or thyromegaly  RESP:  respiratory effort and palpation of chest normal, lungs clear to auscultation , no respiratory distress  CV:  Palpation and auscultation of heart done , regular rate and rhythm, no murmur, no edema, +2 pedal pulses  ABDOMEN:  soft, nontender, no hepatosplenomegaly or other masses  M/S:  wheelchair, wearing TLSO.REN with good strength,  no joint inflammation  SKIN:  right hip incision healed.  No rashes or open areas  PSYCH:  oriented to self, situation,  insight and judgement impaired, memory impaired    Recent Labs:    Last Basic Metabolic Panel:  Lab Results   Component Value Date     08/28/2017      Lab Results   Component Value Date    POTASSIUM 3.7 08/28/2017     Lab Results   Component Value Date    CHLORIDE 104  08/28/2017     Lab Results   Component Value Date    MICHELLE 9.9 08/28/2017     Lab Results   Component Value Date    CO2 29 08/28/2017     Lab Results   Component Value Date    BUN 15 08/28/2017     Lab Results   Component Value Date    CR 0.57 08/28/2017     Lab Results   Component Value Date    GLC 91 08/28/2017     Lab Results   Component Value Date    WBC 9.9 08/17/2017     Lab Results   Component Value Date    RBC 3.96 08/17/2017     Lab Results   Component Value Date    HGB 12.4 08/17/2017     Lab Results   Component Value Date    HCT 37.8 08/17/2017     Lab Results   Component Value Date    MCV 96 08/17/2017     Lab Results   Component Value Date    MCH 31.3 08/17/2017     Lab Results   Component Value Date    MCHC 32.8 08/17/2017     Lab Results   Component Value Date    RDW 13.9 08/17/2017     Lab Results   Component Value Date     08/17/2017       ASSESSMENT / PLAN:  (S72.001D) Closed fracture of neck of right femur with routine healing  (primary encounter diagnosis)  (S32.019D) Closed fracture of first lumbar vertebra with routine healing, unspecified fracture morphology, subsequent encounter  (S32.591D) Fracture of right inferior pubic ramus, with routine healing, subsequent encounter  Comment: pain and mobility improved.   Plan: continue tylenol. TLSO when out of bed or HOB >30 degrees. Follow up with Ortho as scheduled. Follow up with Neurosurgery 10/20/2017.    (M30.0) PAN (polyarteritis nodosa) (H)  Comment: appears asymptomatic  Plan: continue Imuran.     (F32.9) Depression, unspecified depression type  Comment: in good spirits. Increased confusion and day time sleepiness on higher dose zoloft.   Plan: decrease zoloft to 50 mg and give at HS. May consider tapering and discontinuing.     (K21.9) Gastroesophageal reflux disease without esophagitis  Comment: no GI issues. No history of ulcer or GI bleed  Plan: decrease omeprazole to daily and monitor    (R13.12) Oropharyngeal dysphagia  Comment:  improved with no signs of aspiration  Plan: continue SPEECH THERAPY    (R41.89) Cognitive impairment  Comment: moderate deficits. No longer safe to live alone.   Plan: discussed with her daughter. Family understands patient can't return home alone and they are looking for AL placement. We discussed services that she would need, including med management. We also discussed Neuropsychiatric testing, which daughter will discuss with her siblings. Will check TSH    (E87.6) Hypokalemia  Comment: mild. Replacement discontinued   Plan: BMP 9/18/2017    Transient afib  Comment: no signs/symptoms of recurrent afib. Rate controlled off metoprolol.   Plan: monitor VS. Discussed with daughter, who declines Cardiology referral at this time.     (R53.81) Physical deconditioning  Comment: progressing in therapies  Plan: continue PHYSICAL THERAPY/OT.       Total time spent with patient visit at the skilled nursing facility was 35 min including patient visit, review of past records and phone call to patient contact. Greater than 50% of total time spent with counseling and coordinating care due to complexity of care, unknown disposition    Electronically signed by  HADLEY Parra CNP

## 2017-09-18 ENCOUNTER — TRANSFERRED RECORDS (OUTPATIENT)
Dept: HEALTH INFORMATION MANAGEMENT | Facility: CLINIC | Age: 82
End: 2017-09-18

## 2017-09-18 LAB
ANION GAP SERPL CALCULATED.3IONS-SCNC: 7 MMOL/L (ref 3–14)
BUN SERPL-MCNC: 12 MG/DL (ref 7–30)
CALCIUM SERPL-MCNC: 9.5 MG/DL (ref 8.5–10.1)
CHLORIDE SERPLBLD-SCNC: 102 MMOL/L (ref 94–109)
CO2 SERPL-SCNC: 28 MMOL/L (ref 20–32)
CREAT SERPL-MCNC: 0.59 MG/DL (ref 0.52–1.04)
ERYTHROCYTE [DISTWIDTH] IN BLOOD BY AUTOMATED COUNT: 15 % (ref 10–15)
GFR SERPL CREATININE-BSD FRML MDRD: >90 ML/MIN/1.73M2
GLUCOSE SERPL-MCNC: 101 MG/DL (ref 70–99)
HCT VFR BLD AUTO: 39 % (ref 35–47)
HEMOGLOBIN: 12.7 G/DL (ref 11.7–15.7)
MCH RBC QN AUTO: 30.8 PG (ref 26.5–33)
MCHC RBC AUTO-ENTMCNC: 32.6 G/DL (ref 31.5–36.5)
MCV RBC AUTO: 94 FL (ref 78–100)
PLATELET # BLD AUTO: 424 10^9/L (ref 150–450)
POTASSIUM SERPL-SCNC: 3.2 MMOL/L (ref 3.4–5.3)
RBC # BLD AUTO: 4.13 10^12/L (ref 3.8–5.2)
SODIUM SERPL-SCNC: 137 MMOL/L (ref 133–144)
TSH SERPL-ACNC: 5.27 MU/L (ref 0.4–4)
WBC # BLD AUTO: 7.9 10^9/L (ref 4–11)

## 2017-09-19 ENCOUNTER — NURSING HOME VISIT (OUTPATIENT)
Dept: GERIATRICS | Facility: CLINIC | Age: 82
End: 2017-09-19
Payer: COMMERCIAL

## 2017-09-19 VITALS
SYSTOLIC BLOOD PRESSURE: 100 MMHG | BODY MASS INDEX: 18.6 KG/M2 | OXYGEN SATURATION: 96 % | DIASTOLIC BLOOD PRESSURE: 61 MMHG | TEMPERATURE: 97.1 F | WEIGHT: 111.8 LBS | HEART RATE: 61 BPM | RESPIRATION RATE: 16 BRPM

## 2017-09-19 DIAGNOSIS — R53.81 PHYSICAL DECONDITIONING: ICD-10-CM

## 2017-09-19 DIAGNOSIS — S72.001D CLOSED FRACTURE OF NECK OF RIGHT FEMUR WITH ROUTINE HEALING: Primary | ICD-10-CM

## 2017-09-19 DIAGNOSIS — R13.12 OROPHARYNGEAL DYSPHAGIA: ICD-10-CM

## 2017-09-19 DIAGNOSIS — E03.9 HYPOTHYROIDISM, UNSPECIFIED TYPE: ICD-10-CM

## 2017-09-19 DIAGNOSIS — E87.6 HYPOKALEMIA: ICD-10-CM

## 2017-09-19 DIAGNOSIS — S32.591D: ICD-10-CM

## 2017-09-19 DIAGNOSIS — R41.89 COGNITIVE IMPAIRMENT: ICD-10-CM

## 2017-09-19 DIAGNOSIS — S32.019D CLOSED FRACTURE OF FIRST LUMBAR VERTEBRA WITH ROUTINE HEALING, UNSPECIFIED FRACTURE MORPHOLOGY, SUBSEQUENT ENCOUNTER: ICD-10-CM

## 2017-09-19 DIAGNOSIS — F32.A DEPRESSION, UNSPECIFIED DEPRESSION TYPE: ICD-10-CM

## 2017-09-19 PROCEDURE — 99309 SBSQ NF CARE MODERATE MDM 30: CPT | Performed by: NURSE PRACTITIONER

## 2017-09-19 NOTE — PROGRESS NOTES
[facility] [facility]    Chief Complaint   Patient presents with     RECHECK       HPI:    Rayne Mabry is a 87 year old  (4/26/1930), who is being seen today for an episodic care visit at [place] on [facility].  HPI information obtained from: facility chart records, facility staff, patient report and [facility] chart review.  She came to this facility  for short term rehab and medical management [place]11[place]2017 [place] [facility] [place] resulting in right femur fracture, right inferior pubic ramus fracture and L1 vertebral fracture. She was disoriented in the ED. CT head negative for acute finding. She underwent right LEXIS on 7/27/2017. Lovenox started for DVT prophylaxis. She is WBAT. Neurosurgery recommended TLSO for L1 fracture.   On hospital admission, she was hypoxic with leukocytosis. CT chest showed consolidative opacities and she received azithromycin and ceftriaxone for suspected pneumonia vs reactivation of mycobacterium avium complex pneumonia. ID consulted and felt low likelihood of MAC reactivation.   Hospital course complicated by post op delirium with hallucinations that improved after stopping all sedating meds. Zoloft was decreased per Psychiatry. She had an episode of Afib with RVR and probable systolic CHF exacerbation that improved with diuresis. HR spontaneously returned to NSR.    Today's concern is:     Closed fracture of neck of right femur with routine healing-reports feeling well. Denies pain of any type.   Closed fracture of first lumbar vertebra with routine healing, unspecified fracture morphology, subsequent encounter  Fracture of right inferior pubic ramus, with routine healing, subsequent encounter  Hypothyroidism, unspecified type-TSH 5.27  Oropharyngeal dysphagia-tolerating regular diet with thin liquids.   Hypokalemia-KCl discontinued last week. K 3.2 on recheck 9/18/2017. No GI losses,not on diuretic.   Depression, unspecified depression type-zoloft decreased last week due to  daytime sleepiness and confusion.  Reports her mood is good and she sleeps well at night.   Cognitive impairment-pleasant and talkative. Responds to questions appropriately. Less sleepy today. CPT 4/5.6, SLUMS 14/30  Physical deconditioning-ambulating 200 ft with walker and stand by to contact guard assist. Requires stand by assist with cares.     BP's: 100/61, 126/76, 132/66  HR: 61-91    ALLERGIES: Zolpidem  Past Medical, Surgical, Family and Social History reviewed and updated in [facility].    Current Outpatient Prescriptions   Medication Sig Dispense Refill     POTASSIUM CHLORIDE ER PO Take 10 mEq by mouth daily       Zinc Oxide (SKIN PROTECTANT EX) Externally apply topically 2 times daily Apply to both feet       senna-docusate (SENOKOT-S;PERICOLACE) 8.6-50 MG per tablet Take 1 tablet by mouth daily as needed        Calcium-Magnesium-Vitamin D (CALCIUM 500 PO) Take 500 mg by mouth 2 times daily       acetaminophen (TYLENOL) 325 MG tablet Take 650 mg by mouth every 4 hours as needed        albuterol (PROAIR HFA/PROVENTIL HFA/VENTOLIN HFA) 108 (90 BASE) MCG/ACT Inhaler Inhale 1-2 puffs into the lungs every 4 hours as needed       azaTHIOprine (IMURAN) 50 MG tablet Take 150 mg by mouth daily       melatonin 3 MG tablet Take 3 mg by mouth At Bedtime        omeprazole (PRILOSEC) 20 MG CR capsule Take 20 mg by mouth daily Do not crush       sertraline (ZOLOFT) 50 MG tablet Take 50 mg by mouth At Bedtime        levothyroxine (SYNTHROID/LEVOTHROID) 50 MCG tablet Take 50 mcg by mouth daily       cholecalciferol (VITAMIN D) 1000 UNIT tablet Take 1 tablet by mouth daily. 100 tablet      Medications reviewed:  Medications reconciled to facility chart and changes were made to reflect current medications as identified as above med list. Below are the changes that were made:   Medications stopped since last EPIC medication reconciliation:   There are no discontinued medications.    Medications started since last EPIC medication  reconciliation:  Orders Placed This Encounter   Medications     POTASSIUM CHLORIDE ER PO     Sig: Take 10 mEq by mouth daily     REVIEW OF SYSTEMS:  10 point ROS of systems including Constitutional, Eyes, Respiratory, Cardiovascular, Gastroenterology, Genitourinary, Integumentary, Muscularskeletal, Psychiatric were all negative except for pertinent positives noted in my HPI.    Physical Exam:  /61  Pulse 61  Temp 97.1  F (36.2  C)  Resp 16  Wt 111 lb 12.8 oz (50.7 kg)  SpO2 96%  BMI 18.6 kg/m2  GENERAL APPEARANCE:  Alert, in no distress  ENT:  Mouth and posterior oropharynx normal, moist mucous membranes, [place]  EYES:  EOM normal, conjunctiva and lids normal  NECK:  No adenopathy,masses or thyromegaly  RESP:  respiratory effort and palpation of chest normal, lungs clear to auscultation , no respiratory distress  CV:  Palpation and auscultation of heart done , regular rate and rhythm, no murmur, no edema, +2 pedal pulses  ABDOMEN:  soft, nontender, no hepatosplenomegaly or other masses  M/S:  gait steady with walker and assist,  wearing TLSO.REN with good strength,  no joint inflammation  SKIN:  right hip incision healed. No rashes or open areas  PSYCH:  oriented to self, place, situation,  insight and judgement impaired, memory impaired    Recent Labs:    TSH 5.27   9/18/2017  Last Basic Metabolic Panel:  Lab Results   Component Value Date     09/18/2017      Lab Results   Component Value Date    POTASSIUM 3.2 09/18/2017     Lab Results   Component Value Date    CHLORIDE 102 09/18/2017     Lab Results   Component Value Date    MICHELLE 9.5 09/18/2017     Lab Results   Component Value Date    CO2 28 09/18/2017     Lab Results   Component Value Date    BUN 12 09/18/2017     Lab Results   Component Value Date    CR 0.59 09/18/2017     Lab Results   Component Value Date     09/18/2017     Lab Results   Component Value Date    WBC 7.9 09/18/2017     Lab Results   Component Value Date    RBC 4.13  09/18/2017     Lab Results   Component Value Date    HGB 12.7 09/18/2017     Lab Results   Component Value Date    HCT 39.0 09/18/2017     Lab Results   Component Value Date    MCV 94 09/18/2017     Lab Results   Component Value Date    MCH 30.8 09/18/2017     Lab Results   Component Value Date    MCHC 32.6 09/18/2017     Lab Results   Component Value Date    RDW 15.0 09/18/2017     Lab Results   Component Value Date     09/18/2017     ASSESSMENT / PLAN:  (S72.001D) Closed fracture of neck of right femur with routine healing  (primary encounter diagnosis)  (S32.019D) Closed fracture of first lumbar vertebra with routine healing, unspecified fracture morphology, subsequent encounter  (S32.591D) Fracture of right inferior pubic ramus, with routine healing, subsequent encounter  Comment: pain and mobility improved  Plan: continue tylenol. TLSO when out of bed or HOB >30 degrees. Follow up with Ortho as scheduled. Follow up with Neurosurgery 10/20/2017.    (E03.9) Hypothyroidism, unspecified type  Comment: TSH slightly above range  Plan: increase levothyroxine to 62.5 mcg daily. Recheck TSH in 8 weeks.     (R13.12) Oropharyngeal dysphagia  Comment: improved. No signs of aspiration  Plan: continue SPEECH THERAPY. Continue regular diet.     (E87.6) Hypokalemia  Comment: mild  Plan: restart KCl 10 meq daily and monitor BMP    (F32.9) Depression, unspecified depression type  Comment: managed. Daytime sleepiness appears improved on lower dose zoloft  Plan: continue zoloft at HS.     (R41.89) Cognitive impairment  Comment: improved from admission. Moderate deficits  Plan: supportive care    (R53.81) Physical deconditioning  Comment: progressing in therapies  Plan: continue PHYSICAL THERAPY/OT. Family looking for AL placement    The above discussed with daughter Darlene Mccloud who agrees with plan of care.       Electronically signed by  HADLEY Parra CNP

## 2017-09-21 PROBLEM — E03.9 HYPOTHYROIDISM: Status: ACTIVE | Noted: 2017-09-21

## 2017-09-27 ENCOUNTER — NURSING HOME VISIT (OUTPATIENT)
Dept: GERIATRICS | Facility: CLINIC | Age: 82
End: 2017-09-27
Payer: COMMERCIAL

## 2017-09-27 VITALS
TEMPERATURE: 97.1 F | BODY MASS INDEX: 18.67 KG/M2 | HEART RATE: 80 BPM | DIASTOLIC BLOOD PRESSURE: 60 MMHG | WEIGHT: 112.2 LBS | OXYGEN SATURATION: 98 % | RESPIRATION RATE: 16 BRPM | SYSTOLIC BLOOD PRESSURE: 106 MMHG

## 2017-09-27 DIAGNOSIS — R53.81 PHYSICAL DECONDITIONING: ICD-10-CM

## 2017-09-27 DIAGNOSIS — S72.001D CLOSED FRACTURE OF NECK OF RIGHT FEMUR WITH ROUTINE HEALING: Primary | ICD-10-CM

## 2017-09-27 DIAGNOSIS — S32.591D: ICD-10-CM

## 2017-09-27 DIAGNOSIS — R13.12 OROPHARYNGEAL DYSPHAGIA: ICD-10-CM

## 2017-09-27 DIAGNOSIS — E03.9 HYPOTHYROIDISM, UNSPECIFIED TYPE: ICD-10-CM

## 2017-09-27 DIAGNOSIS — R41.89 COGNITIVE IMPAIRMENT: ICD-10-CM

## 2017-09-27 DIAGNOSIS — M30.0 PAN (POLYARTERITIS NODOSA) (H): ICD-10-CM

## 2017-09-27 DIAGNOSIS — S32.019D CLOSED FRACTURE OF FIRST LUMBAR VERTEBRA WITH ROUTINE HEALING, UNSPECIFIED FRACTURE MORPHOLOGY, SUBSEQUENT ENCOUNTER: ICD-10-CM

## 2017-09-27 PROCEDURE — 99309 SBSQ NF CARE MODERATE MDM 30: CPT | Performed by: NURSE PRACTITIONER

## 2017-09-27 NOTE — PROGRESS NOTES
[facility] [facility]    Chief Complaint   Patient presents with     RECHECK       HPI:    Rayne Mabry is a 87 year old  (4/26/1930), who is being seen today for an episodic care visit at [place] on [facility].  HPI information obtained from: facility chart records, facility staff, patient report and [facility] chart review.  She came to this facility  for short term rehab and medical management 8/11/2017 [place] [facility] [place] a [place] resulting in right femur fracture, right inferior pubic ramus fracture and L1 vertebral fracture. She was disoriented in the ED. CT head negative for acute finding. She underwent right LEXIS on 7/27/2017. Lovenox started for DVT prophylaxis. She is WBAT. Neurosurgery recommended TLSO for L1 fracture.   On hospital admission, she was hypoxic with leukocytosis. CT chest showed consolidative opacities and she received azithromycin and ceftriaxone for suspected pneumonia vs reactivation of mycobacterium avium complex pneumonia. ID consulted and felt low likelihood of MAC reactivation.   Hospital course complicated by post op delirium with hallucinations that improved after stopping all sedating meds. Zoloft was decreased per Psychiatry. She had an episode of Afib with RVR and probable systolic CHF exacerbation that improved with diuresis. HR spontaneously returned to NSR.    Today's concern is:        Closed fracture of neck of right femur with routine healing-reports mild back pain at times, relieved with tylenol. Denies hip or pelvic pain. Wearing TLSO when out of bed. Has developed a mild pressure area on her sternum from the brace. Denies pain of the area.   Closed fracture of first lumbar vertebra with routine healing, unspecified fracture morphology, subsequent encounter  Fracture of right inferior pubic ramus, with routine healing, subsequent encounter  PAN (polyarteritis nodosa) (H)-no signs of flare or acute issue  Oropharyngeal dysphagia-no longer working with speech  therapy. Tolerating regular diet with thin liquids.   Hypothyroidism, unspecified type-TSH 5.27 and levothyroxine increased from 50 mcg to 62.5 mcg daily on 9/19/2017.   Cognitive impairment-in good spirits. Conversation appropriate today. Poor historian. SLUMS 14/30, CPT 4.0/5.6  Physical deconditioning-ambulating 200 ft with walker and stand by to contact guard assist. Requires stand by assist with cares. She remains here on private pay while family looks for AL placement.     BP's: 106/60, 96/64, 97/60  HR: 61-91    ALLERGIES: Zolpidem  Past Medical, Surgical, Family and Social History reviewed and updated in [facility].    Current Outpatient Prescriptions   Medication Sig Dispense Refill     POTASSIUM CHLORIDE ER PO Take 10 mEq by mouth daily       senna-docusate (SENOKOT-S;PERICOLACE) 8.6-50 MG per tablet Take 1 tablet by mouth daily as needed        Calcium-Magnesium-Vitamin D (CALCIUM 500 PO) Take 500 mg by mouth 2 times daily       acetaminophen (TYLENOL) 325 MG tablet Take 650 mg by mouth every 4 hours as needed        albuterol (PROAIR HFA/PROVENTIL HFA/VENTOLIN HFA) 108 (90 BASE) MCG/ACT Inhaler Inhale 1-2 puffs into the lungs every 4 hours as needed       azaTHIOprine (IMURAN) 50 MG tablet Take 150 mg by mouth daily       melatonin 3 MG tablet Take 3 mg by mouth At Bedtime        omeprazole (PRILOSEC) 20 MG CR capsule Take 20 mg by mouth daily Do not crush       sertraline (ZOLOFT) 50 MG tablet Take 50 mg by mouth At Bedtime        levothyroxine (SYNTHROID/LEVOTHROID) 50 MCG tablet Take 62.5 mcg by mouth daily        cholecalciferol (VITAMIN D) 1000 UNIT tablet Take 1 tablet by mouth daily. 100 tablet      Medications reviewed:  Medications reconciled to facility chart and changes were made to reflect current medications as identified as above med list. Below are the changes that were made:   Medications stopped since last EPIC medication reconciliation:   Medications Discontinued During This Encounter    Medication Reason     Zinc Oxide (SKIN PROTECTANT EX) Medication Reconciliation Clean Up       Medications started since last [facility] medication reconciliation:  No orders of the defined types were placed in this encounter.    REVIEW OF SYSTEMS:  10 point ROS of systems including Constitutional, Eyes, Respiratory, Cardiovascular, Gastroenterology, Genitourinary, Integumentary, Muscularskeletal, Psychiatric were all negative except for pertinent positives noted in my HPI.    Physical Exam:  /60  Pulse 80  Temp 97.1  F (36.2  C)  Resp 16  Wt 112 lb 3.2 oz (50.9 kg)  SpO2 98%  BMI 18.67 kg/m2  GENERAL APPEARANCE:  Alert, in no distress  ENT:  Mouth and posterior oropharynx normal, moist mucous membranes, [place]  EYES:  EOM normal, conjunctiva and lids normal  NECK:  No adenopathy,masses or thyromegaly  RESP:  respiratory effort and palpation of chest normal, lungs clear to auscultation , no respiratory distress  CV:  Palpation and auscultation of heart done , regular rate and rhythm, no murmur, no edema, +2 pedal pulses  ABDOMEN:  soft, nontender, no hepatosplenomegaly or other masses  M/S:  gait steady with walker and assist,  wearing TLSO.REN with good strength,  no joint inflammation  SKIN:  right hip incision healed. 0.6 cm superficial open area upper sternum clean,dry with granulation, no erythema.   PSYCH:  oriented to self, place, situation,  insight and judgement impaired, memory impaired    Recent Labs:    Last Basic Metabolic Panel:  Lab Results   Component Value Date     09/18/2017      Lab Results   Component Value Date    POTASSIUM 3.2 09/18/2017     Lab Results   Component Value Date    CHLORIDE 102 09/18/2017     Lab Results   Component Value Date    MICHELLE 9.5 09/18/2017     Lab Results   Component Value Date    CO2 28 09/18/2017     Lab Results   Component Value Date    BUN 12 09/18/2017     Lab Results   Component Value Date    CR 0.59 09/18/2017     Lab Results   Component Value Date      09/18/2017     Lab Results   Component Value Date    WBC 7.9 09/18/2017     Lab Results   Component Value Date    RBC 4.13 09/18/2017     Lab Results   Component Value Date    HGB 12.7 09/18/2017     Lab Results   Component Value Date    HCT 39.0 09/18/2017     Lab Results   Component Value Date    MCV 94 09/18/2017     Lab Results   Component Value Date    MCH 30.8 09/18/2017     Lab Results   Component Value Date    MCHC 32.6 09/18/2017     Lab Results   Component Value Date    RDW 15.0 09/18/2017     Lab Results   Component Value Date     09/18/2017       ASSESSMENT / PLAN:  (S72.001D) Closed fracture of neck of right femur with routine healing  (primary encounter diagnosis)  (S32.019D) Closed fracture of first lumbar vertebra with routine healing, unspecified fracture morphology, subsequent encounter  (S32.591D) Fracture of right inferior pubic ramus, with routine healing, subsequent encounter  Comment: pain and mobility improved. Superficial open area on her sternum healing  well without signs of infection.   Plan: continue tylenol. Continue TLSO. Ortho and Neurosurgery follow up as scheduled. Daily wound care to pressure area using a foam dressing for protection.     (M30.0) PAN (polyarteritis nodosa) (H)  Comment: no acute issues  Plan: continue Imuran.     (R13.12) Oropharyngeal dysphagia  Comment: improved. No longer working with ST  Plan: continue regular diet with thin liquids    (E03.9) Hypothyroidism, unspecified type  Comment: levothyroxine recently increased  Plan: continue levothyroxine. Recheck TSH in 6-8 weeks.     (R41.89) Cognitive impairment  Comment: moderate deficits. No longer safe to live alone.   Plan: supportive care. Family looking for AL.     (R53.81) Physical deconditioning  Comment: has made good progress in therapies  Plan: continue PHYSICAL THERAPY/OT.          Electronically signed by  HADLEY Parra CNP

## 2017-10-04 ENCOUNTER — NURSING HOME VISIT (OUTPATIENT)
Dept: GERIATRICS | Facility: CLINIC | Age: 82
End: 2017-10-04
Payer: COMMERCIAL

## 2017-10-04 ENCOUNTER — TRANSFERRED RECORDS (OUTPATIENT)
Dept: HEALTH INFORMATION MANAGEMENT | Facility: CLINIC | Age: 82
End: 2017-10-04

## 2017-10-04 VITALS
DIASTOLIC BLOOD PRESSURE: 65 MMHG | TEMPERATURE: 95.3 F | OXYGEN SATURATION: 98 % | SYSTOLIC BLOOD PRESSURE: 102 MMHG | HEART RATE: 86 BPM | RESPIRATION RATE: 16 BRPM

## 2017-10-04 DIAGNOSIS — F32.A DEPRESSION, UNSPECIFIED DEPRESSION TYPE: ICD-10-CM

## 2017-10-04 DIAGNOSIS — R41.89 COGNITIVE IMPAIRMENT: ICD-10-CM

## 2017-10-04 DIAGNOSIS — S32.591D: ICD-10-CM

## 2017-10-04 DIAGNOSIS — R30.0 DYSURIA: Primary | ICD-10-CM

## 2017-10-04 DIAGNOSIS — R53.81 PHYSICAL DECONDITIONING: ICD-10-CM

## 2017-10-04 DIAGNOSIS — S72.001D CLOSED FRACTURE OF NECK OF RIGHT FEMUR WITH ROUTINE HEALING: ICD-10-CM

## 2017-10-04 DIAGNOSIS — S32.019D CLOSED FRACTURE OF FIRST LUMBAR VERTEBRA WITH ROUTINE HEALING, UNSPECIFIED FRACTURE MORPHOLOGY, SUBSEQUENT ENCOUNTER: ICD-10-CM

## 2017-10-04 PROCEDURE — 99309 SBSQ NF CARE MODERATE MDM 30: CPT | Performed by: NURSE PRACTITIONER

## 2017-10-04 NOTE — PROGRESS NOTES
"[place] [facility]    Chief Complaint   Patient presents with     RECHECK       HPI:    Rayne Mabry is a 87 year old  (4/26/1930), who is being seen today for an episodic care visit at [place] on [facility].  HPI information obtained from: facility chart records, facility staff, patient report and [facility] chart review.  She came to this facility  for short term rehab and medical [place] [facility] [place] right femur fracture, right inferior pubic ramus fracture and L1 vertebral fracture. She was disoriented in the ED. CT head negative for acute finding. She underwent right LEXIS on 7/27/2017. Lovenox started for DVT prophylaxis. She is WBAT. Neurosurgery recommended TLSO for L1 fracture.   On hospital admission, she was hypoxic with leukocytosis. CT chest showed consolidative opacities and she received azithromycin and ceftriaxone for suspected pneumonia vs reactivation of mycobacterium avium complex pneumonia. ID consulted and felt low likelihood of MAC reactivation.   Hospital course complicated by post op delirium with hallucinations that improved after stopping all sedating meds. Zoloft was decreased per Psychiatry. She had an episode of Afib with RVR and probable systolic CHF exacerbation that improved with diuresis. HR spontaneously returned to NSR.    Today's concern is:     Dysuria-nurse reports urgency, frequency and pain with urination. Patient reports symptoms were worse last night but denies any symptoms today. Afebrile. Denies abdominal or pelvic pain.   Closed fracture of neck of right femur with routine healing  Fracture of right inferior pubic ramus, with routine healing, subsequent encounter  Closed fracture of first lumbar vertebra with routine healing, unspecified fracture morphology, subsequent encounter-reports mild pain of her mid back at times with activity. Denies other pain.   Depression, unspecified depression type-reports her mood is \"pretty " "good.\" Sleeping well.   Cognitive impairment-SLUMS 14/30, CPT 4.0/5.6. Pleasant and talkative. Responds to questions appropriately.   Physical deconditioning-ambulating up to 600 ft with walker and stand by assist. Requires stand by assist with cares. She remains here on private pay while family looks for AL placement.     BP's: 102/65, 111/68, 119/68  HR: 70-91    ALLERGIES: Zolpidem  Past Medical, Surgical, Family and Social History reviewed and updated in [facility].    Current Outpatient Prescriptions   Medication Sig Dispense Refill     Skin Protectants, Misc. (EUCERIN) cream Apply topically 2 times daily Apply to both feet       POTASSIUM CHLORIDE ER PO Take 10 mEq by mouth daily       senna-docusate (SENOKOT-S;PERICOLACE) 8.6-50 MG per tablet Take 1 tablet by mouth daily as needed        Calcium-Magnesium-Vitamin D (CALCIUM 500 PO) Take 500 mg by mouth 2 times daily       acetaminophen (TYLENOL) 325 MG tablet Take 650 mg by mouth every 4 hours as needed        albuterol (PROAIR HFA/PROVENTIL HFA/VENTOLIN HFA) 108 (90 BASE) MCG/ACT Inhaler Inhale 1-2 puffs into the lungs every 4 hours as needed       azaTHIOprine (IMURAN) 50 MG tablet Take 150 mg by mouth daily       melatonin 3 MG tablet Take 3 mg by mouth At Bedtime        omeprazole (PRILOSEC) 20 MG CR capsule Take 20 mg by mouth daily Do not crush       sertraline (ZOLOFT) 50 MG tablet Take 50 mg by mouth At Bedtime        levothyroxine (SYNTHROID/LEVOTHROID) 50 MCG tablet Take 62.5 mcg by mouth daily        cholecalciferol (VITAMIN D) 1000 UNIT tablet Take 1 tablet by mouth daily. 100 tablet      Medications reviewed:  Medications reconciled to facility chart and changes were made to reflect current medications as identified as above med list. Below are the changes that were made:   Medications stopped since last EPIC medication reconciliation:   There are no discontinued medications.    Medications started since last [facility] medication reconciliation:  Orders " Placed This Encounter   Medications     Skin Protectants, Misc. (EUCERIN) cream     Sig: Apply topically 2 times daily Apply to both feet     REVIEW OF SYSTEMS:  10 point ROS of systems including Constitutional, Eyes, Respiratory, Cardiovascular, Gastroenterology, Genitourinary, Integumentary, Muscularskeletal, Psychiatric were all negative except for pertinent positives noted in my HPI.    Physical Exam:  /65  Pulse 86  Temp 95.3  F (35.2  C)  Resp 16  SpO2 98%  GENERAL APPEARANCE:  Alert, in no distress  ENT:  Mouth and posterior oropharynx normal, moist mucous membranes, [place]  EYES:  EOM normal, conjunctiva and lids normal  NECK:  No adenopathy,masses or thyromegaly  RESP:  respiratory effort and palpation of chest normal, lungs clear to auscultation , no respiratory distress  CV:  Palpation and auscultation of heart done , regular rate and rhythm, no murmur, no edema, +2 pedal pulses  ABDOMEN:  soft, nontender, no hepatosplenomegaly or other masses  M/S:  gait steady with walker and assist,  wearing TLSO.REN with good strength,  no joint inflammation  SKIN:  right hip incision healed. Very small superficial open area upper sternum clean,dry with granulation, no erythema.   PSYCH:  oriented to self, place, situation,  insight and judgement impaired, memory impaired    Recent Labs:    Last Basic Metabolic Panel:  Lab Results   Component Value Date     09/18/2017      Lab Results   Component Value Date    POTASSIUM 3.2 09/18/2017     Lab Results   Component Value Date    CHLORIDE 102 09/18/2017     Lab Results   Component Value Date    MICHELLE 9.5 09/18/2017     Lab Results   Component Value Date    CO2 28 09/18/2017     Lab Results   Component Value Date    BUN 12 09/18/2017     Lab Results   Component Value Date    CR 0.59 09/18/2017     Lab Results   Component Value Date     09/18/2017     Lab Results   Component Value Date    WBC 7.9 09/18/2017     Lab Results   Component Value Date    RBC  4.13 09/18/2017     Lab Results   Component Value Date    HGB 12.7 09/18/2017     Lab Results   Component Value Date    HCT 39.0 09/18/2017     Lab Results   Component Value Date    MCV 94 09/18/2017     Lab Results   Component Value Date    MCH 30.8 09/18/2017     Lab Results   Component Value Date    MCHC 32.6 09/18/2017     Lab Results   Component Value Date    RDW 15.0 09/18/2017     Lab Results   Component Value Date     09/18/2017       ASSESSMENT / PLAN:  (R30.0) Dysuria  (primary encounter diagnosis)  Comment: possible UTI, although she denies symptoms today  Plan: UA/UC. Message left updating her daughter Darlene Mccloud.     (S72.001D) Closed fracture of neck of right femur with routine healing  (S32.591D) Fracture of right inferior pubic ramus, with routine healing, subsequent encounter  (S32.019D) Closed fracture of first lumbar vertebra with routine healing, unspecified fracture morphology, subsequent encounter  Comment: pain and mobility improved. Pressure area on sternum almost healed.   Plan: continue tylenol. Continue TLSO. Ortho and Neurosurgery follow up as scheduled. Daily wound care to pressure area using a foam dressing for protection.     (F32.9) Depression, unspecified depression type  Comment: in good spirits. Tolerating zoloft without side effect.   Plan: continue zoloft.     (R41.89) Cognitive impairment  Comment: moderate deficits, likely due to dementia.   Plan: supportive care.     (R53.81) Physical deconditioning  Comment: progressing in therapies  Plan: continue PHYSICAL THERAPY/OT. She is being assessed by an AL this week.         Electronically signed by  HADLEY Parra CNP

## 2017-10-07 ENCOUNTER — TELEPHONE (OUTPATIENT)
Dept: GERIATRICS | Facility: CLINIC | Age: 82
End: 2017-10-07

## 2017-10-07 NOTE — TELEPHONE ENCOUNTER
Patient with UTI - on Cipro but has two organisms and neither is sensitive to Cipro. Both sensitive to Penicillin.    PLAN  Stop Cipro  Start Penicillin 500 mg PO TID x 7 days.     Electronically signed by HADLEY Porter GNP

## 2017-10-09 ENCOUNTER — DISCHARGE SUMMARY NURSING HOME (OUTPATIENT)
Dept: GERIATRICS | Facility: CLINIC | Age: 82
End: 2017-10-09
Payer: COMMERCIAL

## 2017-10-09 VITALS
RESPIRATION RATE: 16 BRPM | HEART RATE: 91 BPM | TEMPERATURE: 97.7 F | BODY MASS INDEX: 18.97 KG/M2 | OXYGEN SATURATION: 95 % | SYSTOLIC BLOOD PRESSURE: 93 MMHG | DIASTOLIC BLOOD PRESSURE: 63 MMHG | WEIGHT: 114 LBS

## 2017-10-09 DIAGNOSIS — R53.81 PHYSICAL DECONDITIONING: ICD-10-CM

## 2017-10-09 DIAGNOSIS — J18.9 PNEUMONIA DUE TO INFECTIOUS ORGANISM, UNSPECIFIED LATERALITY, UNSPECIFIED PART OF LUNG: ICD-10-CM

## 2017-10-09 DIAGNOSIS — S72.001D CLOSED FRACTURE OF NECK OF RIGHT FEMUR WITH ROUTINE HEALING: Primary | ICD-10-CM

## 2017-10-09 DIAGNOSIS — R13.12 OROPHARYNGEAL DYSPHAGIA: ICD-10-CM

## 2017-10-09 DIAGNOSIS — F32.A DEPRESSION, UNSPECIFIED DEPRESSION TYPE: ICD-10-CM

## 2017-10-09 DIAGNOSIS — E87.6 HYPOKALEMIA: ICD-10-CM

## 2017-10-09 DIAGNOSIS — S32.591D: ICD-10-CM

## 2017-10-09 DIAGNOSIS — S32.019D CLOSED FRACTURE OF FIRST LUMBAR VERTEBRA WITH ROUTINE HEALING, UNSPECIFIED FRACTURE MORPHOLOGY, SUBSEQUENT ENCOUNTER: ICD-10-CM

## 2017-10-09 DIAGNOSIS — R41.89 COGNITIVE IMPAIRMENT: ICD-10-CM

## 2017-10-09 DIAGNOSIS — N30.00 ACUTE CYSTITIS WITHOUT HEMATURIA: ICD-10-CM

## 2017-10-09 DIAGNOSIS — D62 ANEMIA DUE TO BLOOD LOSS, ACUTE: ICD-10-CM

## 2017-10-09 DIAGNOSIS — M30.0 PAN (POLYARTERITIS NODOSA) (H): ICD-10-CM

## 2017-10-09 DIAGNOSIS — K21.9 GASTROESOPHAGEAL REFLUX DISEASE WITHOUT ESOPHAGITIS: ICD-10-CM

## 2017-10-09 DIAGNOSIS — E03.9 HYPOTHYROIDISM, UNSPECIFIED TYPE: ICD-10-CM

## 2017-10-09 PROCEDURE — 99316 NF DSCHRG MGMT 30 MIN+: CPT | Performed by: NURSE PRACTITIONER

## 2017-10-09 NOTE — PROGRESS NOTES
[facility] DISCHARGE SUMMARY    PATIENT'S NAME: Rayne Mabry  YOB: 1930  MEDICAL RECORD NUMBER:  [id number]    PRIMARY CARE PROVIDER AND CLINIC RESPONSIBLE AFTER TRANSFER: [facility] [place] [facility] [address] / [place] [address]     CODE STATUS/ADVANCE DIRECTIVES DISCUSSION:   CPR/Full code        Allergies   Allergen Reactions     Zolpidem Other (See Comments)     Confusion even with low dose.       TRANSFERRING PROVIDERS: HADLEY Parra CNP, Jso Guevara MD  DATE OF SNF ADMISSION:  August / 11 / 2017  DATE OF SNF (anticipated) DISCHARGE: October / 10 / 2017  DISCHARGE DISPOSITION: Non-[facility] Provider   Nursing Facility: [facility] stay 07/26/2017 to 08/11/2017.     Condition on Discharge:  Improving.  Cognitive Scores: SLUMS 14/30 and CPT 4.0/5.6    Equipment: walker    DISCHARGE DIAGNOSIS:   1. Closed fracture of neck of right femur with routine healing    2. Closed fracture of first lumbar vertebra with routine healing, unspecified fracture morphology, subsequent encounter    3. Fracture of right inferior pubic ramus, with routine healing, subsequent encounter    4. PAN (polyarteritis nodosa) (H)    5. Pneumonia due to infectious organism, unspecified laterality, unspecified part of lung    6. Oropharyngeal dysphagia    7. Acute cystitis without hematuria    8. Hypothyroidism, unspecified type    9. Depression, unspecified depression type    10. Cognitive impairment    11. Anemia due to blood loss, acute    12. Hypokalemia    13. Gastroesophageal reflux disease without esophagitis    14. Physical deconditioning        HPI Nursing Facility Course:  HPI information obtained from: facility chart records, facility staff, patient report, [facility] chart review and family/first contact daughter Darlene Mccloud report.  She came to this facility  for short term rehab and medical management following hospitalization  after a fall resulting in right femur fracture, right inferior pubic ramus fracture and L1 vertebral fracture. She was disoriented in the ED. CT head negative for acute finding. She underwent right LEXIS on 7/27/2017. Neurosurgery recommended TLSO for L1 fracture. On hospital admission, she was hypoxic with leukocytosis. CT chest showed consolidative opacities and she received azithromycin and ceftriaxone for suspected pneumonia vs reactivation of mycobacterium avium complex pneumonia. ID consulted and felt low likelihood of MAC reactivation.   Hospital course complicated by post op delirium with hallucinations that improved after stopping all sedating meds. Zoloft was decreased per Psychiatry. She had an episode of Afib with RVR and probable systolic CHF exacerbation that improved with diuresis. HR spontaneously returned to NSR.    She has worked with PHYSICAL THERAPY/OT and SPEECH THERAPY with slow, but good results. She is currently ambulating up to 600 ft with walker and stand by assist. She requires stand by assist with ADLs. Due to decline in both cognition and functional status, she is no longer safe to live alone. Family has made arrangements for her to move to [facility].   ASSESSMENT / PLAN:  (S72.001D) Closed fracture of neck of right femur with routine healing  (primary encounter diagnosis)  (S32.019D) Closed fracture of first lumbar vertebra with routine healing, unspecified fracture morphology, subsequent encounter  (S32.041D) Fracture of right inferior pubic ramus, with routine healing, subsequent encounter  Comment: pain and mobility much improved. She last saw Ortho 9/13/2017. She has developed a small pressure area on her sternum from the TLSO that is healing well.   Plan: discharge to [place]. Continue tylenol prn. TLSO as below. Home services and follow up appts as below.     (M30.0) PAN (polyarteritis nodosa) (H)  Comment: no acute issues  Plan: continue Imuran. Follow up Rheumatology per usual  routine.     (J18.9) Pneumonia due to infectious organism, unspecified laterality, unspecified part of lung  Comment: resolved   Plan: monitor    (R13.12) Oropharyngeal dysphagia  Comment: tolerating regular diet with thin liquids. No signs of aspiration.   Plan: continue    (N30.00) Acute cystitis without hematuria  Comment: she developed dysuria last week and cipro was started 10/5/2017. Final UC results on 10/7/2017: 50,000-100,000 Aerococcus urinae and 50,000-100,000 Enterococcus faecalis. Cipro discontinued and PCN started based on susceptibilities.   She reports improved symptoms today.   Plan: decreased PCN to bid at discharge (will have bid med administration at [place]) for 7 more days.     (E03.9) Hypothyroidism, unspecified type  Comment: TSH 5.27 on 9/18/2017 and levothyroxine increased slightly from 50 mcg to 62.5 mcg daily.   Plan: continue levothyroxine. Recommend repeat TSH mid next month.     (F32.9) Depression, unspecified depression type  Comment: appears in good spirits on lower dose zoloft. She had increased daytime sleepiness and confusion with an attempt to  resume previous dose of 100 mg daily.   Plan: continue zoloft 50 mg at HS.     (R41.89) Cognitive impairment  Comment: she had significant delirium at the time of tcu admission, which has cleared. Moderate deficits, consistent with underlying dementia. Pleasant and cooperative. Neuropsychiatric testing previously discussed with daughter and family declined.    Plan: supportive care in [place].     (D62) Anemia due to blood loss, acute  Comment: Hgb improved from 10.6 at tcu admission.   Plan: follow Hgb prn.     (E87.6) Hypokalemia  Comment: she's had mild hypokalemia throughout tcu stay. No GI losses and not on diuretic.   Plan: continue KCl. Follow BMP    (K21.9) Gastroesophageal reflux disease without esophagitis  Comment: asymptomatic on lower dose PPI  Plan: continue omeprazole-may be able to discontinue in the near future.     Transient atrial  fibrillation  Possible CHF  Comment: rate and rhythm have been regular throughout tcu stay. No signs of volume overload or other cardiac issues.   Plan: monitor. Cardiology referral previously discussed with daughter and declined.     (R53.75) Physical deconditioning  Comment: progress in therapies as above.   Plan: home therapies for ongoing strengthening, gait training and ADL safety.       PAST MEDICAL HISTORY:  has a past medical history of Arthritis; Depression; MAC (mycobacterium avium-intracellulare complex); Polyarteritis nodosa (H); and Thyroid disease.    DISCHARGE MEDICATIONS:  Current Outpatient Prescriptions   Medication Sig Dispense Refill     PENICILLIN V POTASSIUM PO Take 500 mg by mouth 3 times daily For 7 days       Skin Protectants, Misc. (EUCERIN) cream Apply topically 2 times daily Apply to both feet       POTASSIUM CHLORIDE ER PO Take 10 mEq by mouth daily       senna-docusate (SENOKOT-S;PERICOLACE) 8.6-50 MG per tablet Take 1 tablet by mouth daily as needed        Calcium-Magnesium-Vitamin D (CALCIUM 500 PO) Take 500 mg by mouth 2 times daily       acetaminophen (TYLENOL) 325 MG tablet Take 650 mg by mouth every 4 hours as needed        albuterol (PROAIR HFA/PROVENTIL HFA/VENTOLIN HFA) 108 (90 BASE) MCG/ACT Inhaler Inhale 1-2 puffs into the lungs every 4 hours as needed       azaTHIOprine (IMURAN) 50 MG tablet Take 150 mg by mouth daily       melatonin 3 MG tablet Take 3 mg by mouth At Bedtime        omeprazole (PRILOSEC) 20 MG CR capsule Take 20 mg by mouth daily Do not crush       sertraline (ZOLOFT) 50 MG tablet Take 50 mg by mouth At Bedtime        levothyroxine (SYNTHROID/LEVOTHROID) 50 MCG tablet Take 62.5 mcg by mouth daily        cholecalciferol (VITAMIN D) 1000 UNIT tablet Take 1 tablet by mouth daily. 100 tablet        MEDICATION CHANGES/RATIONALE:   Metoprolol tapered and discontinued  KCl for hypotension.   Melatonin scheduled for sleep.   Bowel regimen adjusted  Omeprazole  decreased from bid to daily  Levothyroxine increased  Zoloft increased back to 100 mg, then back down to 50 mg due to sleepiness  Cipro started 10/5/201 for UTI, changed to PCN on 10/7/2017 based on susceptibilities    Controlled medications sent with patient: not applicable/none     ROS:    10 point ROS of systems including Constitutional, Eyes, Respiratory, Cardiovascular, Gastroenterology, Genitourinary, Integumentary, Muscularskeletal, Psychiatric were all negative except for pertinent positives noted in my HPI.    Physical Exam:   Vitals: BP 93/63  Pulse 91  Temp 97.7  F (36.5  C)  Resp 16  Wt 114 lb (51.7 kg)  SpO2 95%  BMI 18.97 kg/m2  BMI= Body mass index is 18.97 kg/(m^2).    GENERAL APPEARANCE:  Alert, in no distress  ENT:  Mouth and posterior oropharynx normal, moist mucous membranes, [place]  EYES:  EOM normal, conjunctiva and lids normal  NECK:  No adenopathy,masses or thyromegaly  RESP:  respiratory effort and palpation of chest normal, lungs clear to auscultation , no respiratory distress  CV:  Palpation and auscultation of heart done , regular rate and rhythm, no murmur, no edema, +2 pedal pulses  ABDOMEN:  soft, nontender, no hepatosplenomegaly or other masses  M/S:  gait steady with walker and assist,  wearing TLSO.REN with good strength,  no joint inflammation  SKIN:  right hip incision healed. Very small superficial open area upper sternum clean,dry with granulation, no erythema.   PSYCH:  oriented to self, place, situation,  insight and judgement impaired, memory impaired    DISCHARGE PLAN:  Occupational Therapy, Physical Therapy and From:  [facility]  Patient instructed to follow-up with:  PCP in 7 days    Neurosurgery, Dr Talamantes,  at [facility] 10/20/2017  Ortho, Dr Fuentes, at [facility] 10/20/2017    Current [facility] scheduled appointments:  No future appointments.    MTM referral needed and placed by this provider: No    Pending labs: None  SNF labs   Last Basic Metabolic Panel:  Lab Results    Component Value Date     09/18/2017      Lab Results   Component Value Date    POTASSIUM 3.2 09/18/2017     Lab Results   Component Value Date    CHLORIDE 102 09/18/2017     Lab Results   Component Value Date    MICHELLE 9.5 09/18/2017     Lab Results   Component Value Date    CO2 28 09/18/2017     Lab Results   Component Value Date    BUN 12 09/18/2017     Lab Results   Component Value Date    CR 0.59 09/18/2017     Lab Results   Component Value Date     09/18/2017     Lab Results   Component Value Date    WBC 7.9 09/18/2017     Lab Results   Component Value Date    RBC 4.13 09/18/2017     Lab Results   Component Value Date    HGB 12.7 09/18/2017     Lab Results   Component Value Date    HCT 39.0 09/18/2017     Lab Results   Component Value Date    MCV 94 09/18/2017     Lab Results   Component Value Date    MCH 30.8 09/18/2017     Lab Results   Component Value Date    MCHC 32.6 09/18/2017     Lab Results   Component Value Date    RDW 15.0 09/18/2017     Lab Results   Component Value Date     09/18/2017     Discharge Treatments: Wound care to pressure ulcer on sternum every 3 days and prn. TLSO when out of bed or HOB >30 degrees.     TOTAL DISCHARGE TIME:   Greater than 30 minutes  Electronically signed by:  HADLEY Parra CNP

## 2019-06-30 ENCOUNTER — HOSPITAL ENCOUNTER (INPATIENT)
Facility: CLINIC | Age: 84
LOS: 4 days | Discharge: NURSING, REHAB OR LONG-TERM CARE | DRG: 481 | End: 2019-07-04
Attending: EMERGENCY MEDICINE | Admitting: INTERNAL MEDICINE
Payer: COMMERCIAL

## 2019-06-30 ENCOUNTER — APPOINTMENT (OUTPATIENT)
Dept: GENERAL RADIOLOGY | Facility: CLINIC | Age: 84
DRG: 481 | End: 2019-06-30
Attending: EMERGENCY MEDICINE
Payer: COMMERCIAL

## 2019-06-30 DIAGNOSIS — K21.9 GASTROESOPHAGEAL REFLUX DISEASE WITHOUT ESOPHAGITIS: ICD-10-CM

## 2019-06-30 DIAGNOSIS — K59.03 DRUG-INDUCED CONSTIPATION: Primary | ICD-10-CM

## 2019-06-30 DIAGNOSIS — S72.002A DISPLACED FRACTURE OF LEFT FEMORAL NECK (H): ICD-10-CM

## 2019-06-30 DIAGNOSIS — S72.002A CLOSED FRACTURE OF LEFT HIP, INITIAL ENCOUNTER (H): ICD-10-CM

## 2019-06-30 PROBLEM — S72.009A HIP FRACTURE (H): Status: ACTIVE | Noted: 2019-06-30

## 2019-06-30 LAB
ANION GAP SERPL CALCULATED.3IONS-SCNC: 4 MMOL/L (ref 3–14)
BASOPHILS # BLD AUTO: 0 10E9/L (ref 0–0.2)
BASOPHILS NFR BLD AUTO: 0.4 %
BUN SERPL-MCNC: 18 MG/DL (ref 7–30)
CALCIUM SERPL-MCNC: 9.5 MG/DL (ref 8.5–10.1)
CHLORIDE SERPL-SCNC: 106 MMOL/L (ref 94–109)
CO2 SERPL-SCNC: 30 MMOL/L (ref 20–32)
CREAT SERPL-MCNC: 0.68 MG/DL (ref 0.52–1.04)
DIFFERENTIAL METHOD BLD: ABNORMAL
EOSINOPHIL # BLD AUTO: 0.2 10E9/L (ref 0–0.7)
EOSINOPHIL NFR BLD AUTO: 2.3 %
ERYTHROCYTE [DISTWIDTH] IN BLOOD BY AUTOMATED COUNT: 14.6 % (ref 10–15)
GFR SERPL CREATININE-BSD FRML MDRD: 77 ML/MIN/{1.73_M2}
GLUCOSE SERPL-MCNC: 87 MG/DL (ref 70–99)
HCT VFR BLD AUTO: 37.3 % (ref 35–47)
HGB BLD-MCNC: 12.7 G/DL (ref 11.7–15.7)
IMM GRANULOCYTES # BLD: 0 10E9/L (ref 0–0.4)
IMM GRANULOCYTES NFR BLD: 0.3 %
INTERPRETATION ECG - MUSE: NORMAL
LYMPHOCYTES # BLD AUTO: 0.4 10E9/L (ref 0.8–5.3)
LYMPHOCYTES NFR BLD AUTO: 4.3 %
MCH RBC QN AUTO: 34.5 PG (ref 26.5–33)
MCHC RBC AUTO-ENTMCNC: 34 G/DL (ref 31.5–36.5)
MCV RBC AUTO: 101 FL (ref 78–100)
MONOCYTES # BLD AUTO: 0.5 10E9/L (ref 0–1.3)
MONOCYTES NFR BLD AUTO: 5.3 %
NEUTROPHILS # BLD AUTO: 8.1 10E9/L (ref 1.6–8.3)
NEUTROPHILS NFR BLD AUTO: 87.4 %
NRBC # BLD AUTO: 0 10*3/UL
NRBC BLD AUTO-RTO: 0 /100
PLATELET # BLD AUTO: 273 10E9/L (ref 150–450)
POTASSIUM SERPL-SCNC: 4 MMOL/L (ref 3.4–5.3)
RBC # BLD AUTO: 3.68 10E12/L (ref 3.8–5.2)
SODIUM SERPL-SCNC: 140 MMOL/L (ref 133–144)
WBC # BLD AUTO: 9.3 10E9/L (ref 4–11)

## 2019-06-30 PROCEDURE — 71045 X-RAY EXAM CHEST 1 VIEW: CPT

## 2019-06-30 PROCEDURE — 85025 COMPLETE CBC W/AUTO DIFF WBC: CPT | Performed by: EMERGENCY MEDICINE

## 2019-06-30 PROCEDURE — 25000132 ZZH RX MED GY IP 250 OP 250 PS 637: Performed by: INTERNAL MEDICINE

## 2019-06-30 PROCEDURE — 12000000 ZZH R&B MED SURG/OB

## 2019-06-30 PROCEDURE — 80048 BASIC METABOLIC PNL TOTAL CA: CPT | Performed by: EMERGENCY MEDICINE

## 2019-06-30 PROCEDURE — 99222 1ST HOSP IP/OBS MODERATE 55: CPT | Mod: AI | Performed by: INTERNAL MEDICINE

## 2019-06-30 PROCEDURE — 25000132 ZZH RX MED GY IP 250 OP 250 PS 637: Mod: GY | Performed by: EMERGENCY MEDICINE

## 2019-06-30 PROCEDURE — 25800030 ZZH RX IP 258 OP 636: Performed by: INTERNAL MEDICINE

## 2019-06-30 PROCEDURE — 93005 ELECTROCARDIOGRAM TRACING: CPT

## 2019-06-30 PROCEDURE — 99285 EMERGENCY DEPT VISIT HI MDM: CPT | Mod: 25

## 2019-06-30 RX ORDER — CALCIUM CARBONATE 500(1250)
500 TABLET ORAL 2 TIMES DAILY
COMMUNITY

## 2019-06-30 RX ORDER — POLYETHYLENE GLYCOL 3350 17 G/17G
17 POWDER, FOR SOLUTION ORAL DAILY PRN
Status: DISCONTINUED | OUTPATIENT
Start: 2019-06-30 | End: 2019-07-04 | Stop reason: HOSPADM

## 2019-06-30 RX ORDER — LORATADINE 10 MG/1
10 TABLET ORAL DAILY
COMMUNITY
End: 2019-08-26

## 2019-06-30 RX ORDER — POTASSIUM CHLORIDE 1.5 G/1.58G
20-40 POWDER, FOR SOLUTION ORAL
Status: DISCONTINUED | OUTPATIENT
Start: 2019-06-30 | End: 2019-07-04 | Stop reason: HOSPADM

## 2019-06-30 RX ORDER — POTASSIUM CHLORIDE 29.8 MG/ML
20 INJECTION INTRAVENOUS
Status: DISCONTINUED | OUTPATIENT
Start: 2019-06-30 | End: 2019-06-30 | Stop reason: CLARIF

## 2019-06-30 RX ORDER — ACETAMINOPHEN 650 MG/1
650 SUPPOSITORY RECTAL EVERY 4 HOURS PRN
Status: DISCONTINUED | OUTPATIENT
Start: 2019-06-30 | End: 2019-07-04 | Stop reason: HOSPADM

## 2019-06-30 RX ORDER — LANOLIN ALCOHOL/MO/W.PET/CERES
3 CREAM (GRAM) TOPICAL
Status: DISCONTINUED | OUTPATIENT
Start: 2019-06-30 | End: 2019-07-04 | Stop reason: HOSPADM

## 2019-06-30 RX ORDER — ACETAMINOPHEN 325 MG/1
650 TABLET ORAL EVERY 4 HOURS PRN
Status: DISCONTINUED | OUTPATIENT
Start: 2019-06-30 | End: 2019-07-04 | Stop reason: HOSPADM

## 2019-06-30 RX ORDER — ALBUTEROL SULFATE 90 UG/1
1-2 AEROSOL, METERED RESPIRATORY (INHALATION) EVERY 4 HOURS PRN
Status: DISCONTINUED | OUTPATIENT
Start: 2019-06-30 | End: 2019-07-04 | Stop reason: HOSPADM

## 2019-06-30 RX ORDER — AMOXICILLIN 250 MG
1 CAPSULE ORAL 2 TIMES DAILY PRN
Status: DISCONTINUED | OUTPATIENT
Start: 2019-06-30 | End: 2019-07-04 | Stop reason: HOSPADM

## 2019-06-30 RX ORDER — SODIUM CHLORIDE 9 MG/ML
INJECTION, SOLUTION INTRAVENOUS CONTINUOUS
Status: DISCONTINUED | OUTPATIENT
Start: 2019-06-30 | End: 2019-07-01 | Stop reason: ALTCHOICE

## 2019-06-30 RX ORDER — ERGOCALCIFEROL 1.25 MG/1
50000 CAPSULE ORAL WEEKLY
COMMUNITY

## 2019-06-30 RX ORDER — ONDANSETRON 4 MG/1
4 TABLET, ORALLY DISINTEGRATING ORAL EVERY 6 HOURS PRN
Status: DISCONTINUED | OUTPATIENT
Start: 2019-06-30 | End: 2019-07-01 | Stop reason: ALTCHOICE

## 2019-06-30 RX ORDER — OXYCODONE HYDROCHLORIDE 5 MG/1
5 TABLET ORAL
Status: DISCONTINUED | OUTPATIENT
Start: 2019-06-30 | End: 2019-07-01

## 2019-06-30 RX ORDER — BISMUTH SUBSALICYLATE 262 MG/1
2 TABLET, CHEWABLE ORAL
Status: DISCONTINUED | OUTPATIENT
Start: 2019-06-30 | End: 2019-07-04 | Stop reason: HOSPADM

## 2019-06-30 RX ORDER — POTASSIUM CL/LIDO/0.9 % NACL 10MEQ/0.1L
10 INTRAVENOUS SOLUTION, PIGGYBACK (ML) INTRAVENOUS
Status: DISCONTINUED | OUTPATIENT
Start: 2019-06-30 | End: 2019-07-04 | Stop reason: HOSPADM

## 2019-06-30 RX ORDER — AMOXICILLIN 250 MG
2 CAPSULE ORAL 2 TIMES DAILY PRN
Status: DISCONTINUED | OUTPATIENT
Start: 2019-06-30 | End: 2019-07-04 | Stop reason: HOSPADM

## 2019-06-30 RX ORDER — HYDROMORPHONE HYDROCHLORIDE 1 MG/ML
.2-.4 INJECTION, SOLUTION INTRAMUSCULAR; INTRAVENOUS; SUBCUTANEOUS
Status: DISCONTINUED | OUTPATIENT
Start: 2019-06-30 | End: 2019-07-01 | Stop reason: ALTCHOICE

## 2019-06-30 RX ORDER — POTASSIUM CHLORIDE 7.45 MG/ML
10 INJECTION INTRAVENOUS
Status: DISCONTINUED | OUTPATIENT
Start: 2019-06-30 | End: 2019-07-04 | Stop reason: HOSPADM

## 2019-06-30 RX ORDER — ONDANSETRON 2 MG/ML
4 INJECTION INTRAMUSCULAR; INTRAVENOUS EVERY 6 HOURS PRN
Status: DISCONTINUED | OUTPATIENT
Start: 2019-06-30 | End: 2019-07-01 | Stop reason: ALTCHOICE

## 2019-06-30 RX ORDER — BISMUTH SUBSALICYLATE 262 MG/1
2 TABLET, CHEWABLE ORAL
COMMUNITY

## 2019-06-30 RX ORDER — LIDOCAINE 40 MG/G
CREAM TOPICAL
Status: DISCONTINUED | OUTPATIENT
Start: 2019-06-30 | End: 2019-07-04 | Stop reason: HOSPADM

## 2019-06-30 RX ORDER — ACETAMINOPHEN 325 MG/1
650 TABLET ORAL ONCE
Status: COMPLETED | OUTPATIENT
Start: 2019-06-30 | End: 2019-06-30

## 2019-06-30 RX ORDER — NALOXONE HYDROCHLORIDE 0.4 MG/ML
.1-.4 INJECTION, SOLUTION INTRAMUSCULAR; INTRAVENOUS; SUBCUTANEOUS
Status: DISCONTINUED | OUTPATIENT
Start: 2019-06-30 | End: 2019-07-01 | Stop reason: ALTCHOICE

## 2019-06-30 RX ORDER — LEVOTHYROXINE SODIUM 88 UG/1
88 TABLET ORAL
Status: DISCONTINUED | OUTPATIENT
Start: 2019-07-01 | End: 2019-07-04 | Stop reason: HOSPADM

## 2019-06-30 RX ORDER — SULFAMETHOXAZOLE/TRIMETHOPRIM 800-160 MG
1 TABLET ORAL 2 TIMES DAILY
Status: COMPLETED | OUTPATIENT
Start: 2019-06-30 | End: 2019-07-01

## 2019-06-30 RX ORDER — POTASSIUM CHLORIDE 1500 MG/1
20-40 TABLET, EXTENDED RELEASE ORAL
Status: DISCONTINUED | OUTPATIENT
Start: 2019-06-30 | End: 2019-07-04 | Stop reason: HOSPADM

## 2019-06-30 RX ORDER — TRAMADOL HYDROCHLORIDE 50 MG/1
50 TABLET ORAL EVERY 8 HOURS PRN
Status: ON HOLD | COMMUNITY
End: 2019-07-04

## 2019-06-30 RX ORDER — SULFAMETHOXAZOLE/TRIMETHOPRIM 800-160 MG
1 TABLET ORAL 2 TIMES DAILY
Status: ON HOLD | COMMUNITY
Start: 2019-06-27 | End: 2019-07-03

## 2019-06-30 RX ADMIN — SERTRALINE HYDROCHLORIDE 50 MG: 50 TABLET ORAL at 21:49

## 2019-06-30 RX ADMIN — MELATONIN TAB 3 MG 3 MG: 3 TAB at 21:58

## 2019-06-30 RX ADMIN — SULFAMETHOXAZOLE AND TRIMETHOPRIM 1 TABLET: 800; 160 TABLET ORAL at 21:49

## 2019-06-30 RX ADMIN — SODIUM CHLORIDE: 9 INJECTION, SOLUTION INTRAVENOUS at 17:41

## 2019-06-30 RX ADMIN — ACETAMINOPHEN 650 MG: 325 TABLET, FILM COATED ORAL at 17:41

## 2019-06-30 RX ADMIN — ACETAMINOPHEN 650 MG: 325 TABLET, FILM COATED ORAL at 22:41

## 2019-06-30 RX ADMIN — ACETAMINOPHEN 650 MG: 325 TABLET, FILM COATED ORAL at 14:34

## 2019-06-30 ASSESSMENT — ENCOUNTER SYMPTOMS
SHORTNESS OF BREATH: 0
DIZZINESS: 0
ARTHRALGIAS: 1
HEADACHES: 0
PALPITATIONS: 0
NECK PAIN: 0

## 2019-06-30 ASSESSMENT — ACTIVITIES OF DAILY LIVING (ADL): ADLS_ACUITY_SCORE: 18

## 2019-06-30 NOTE — ED PROVIDER NOTES
History     Chief Complaint:  Hip injury     HPI:   The history is provided by the patient and a relative.      Rayne Mabry is a 89 year old female with a history of CHF and hypothyroidism who presents to the emergency department with her family for evaluation of a hip injury. The patient was at a family gathering yesterday at her sons home and was on her own going down a hallway with her walker. She got to the bathroom but was unable to get the walker through the door so she began to feel her way to the toilet without her walker. However, while doing this she had a mechanical fall to her left hip onto carpet. She did strike the back of her head but she does not feel that it was very hard and did not lose of consciousness. The brunt of the fall was to her hip. She had no symptoms prior to the fall including shortness of breath, chest pain, sudden headache, palpitations, or dizziness. She was unable to get up on her own so she called for help from nearby family. She was unable to bear weight after the incident and required assistance from her son to transfer. She has been using her walker as a wheel chair since that time. She was able to socialize after the incident and went back to her assisted living facility after the party. This morning, her pain had not gotten better so she went to [facility] for evaluation. There, she was found to have a left hip fracture. She was then sent here for further evaluation. Here, the patient rates her pain a 7/10 in severity. Her last Advil was about 8 hours ago. She continues to deny any symptoms beside her left hip pain including headache, neck pain, chest pain, or upper extremity pain. She is not on blood thinners.     Allergies:  Zolpidem      Medications:    Albuterol inhaler   Imuran   Levothyroxine   Omeprazole   Penicillin V Potassium   Zoloft     Past Medical History:    Arthritis   Depression   MAC  Polyarteritis nodosa   Hypothyroidism   GERD  Polyneuropathy   Anemia    Pneumonia   CHF  Neck fracture  Lumbar fracture     Past Surgical History:    Hysterectomy     Family History:    Noncontributory     Social History:  Presents with family    Tobacco use: Never smoker   Alcohol use: 1 glass wine a night   PCP: Susan Hanley    Marital Status:        Review of Systems   Respiratory: Negative for shortness of breath.    Cardiovascular: Negative for chest pain and palpitations.   Musculoskeletal: Positive for arthralgias and gait problem. Negative for neck pain.   Neurological: Negative for dizziness, syncope and headaches.   All other systems reviewed and are negative.    Physical Exam     Patient Vitals for the past 24 hrs:   BP Temp Temp src Pulse Heart Rate Resp SpO2 Weight   06/30/19 1445 131/55 -- -- 80 -- -- 93 % --   06/30/19 1433 128/60 -- -- 76 -- -- -- --   06/30/19 1403 122/60 98.1  F (36.7  C) Oral 74 74 16 99 % 52.2 kg (115 lb)        Physical Exam  Physical Exam   General:  Sitting on bed with daughter and son in law at bedside. Pt in no significant distress.  Talkative.   HENT:  No obvious trauma to head. Negative mane's sign and negative raccoon eyes bilaterally.  Right Ear:  External ear normal.  Left Ear:  External ear normal.  Nose:  Nose normal. No epistaxis.  Eyes:  Conjunctivae and EOM are normal. Pupils are equal, round, and reactive.   Neck: Normal range of motion. Neck supple. No tracheal deviation present. No midline cervical neck tenderness, deformity, step off or pain in the midline with ROM.  CV:  Normal heart sounds. No murmur heard.  Pulm/Chest: Effort normal and breath sounds normal.   Abd: Soft. No distension. There is no tenderness. There is no rigidity, no rebound and no guarding.   M/S: Normal range of motion. No pain to palpation or deformity of all 4 extremities, other than mild tenderness to the left hip and the left hip is shortened and leg is slightly flexed at the knee with a pillow underneath it to provide comfort to the hip.  DP pulse +2 to left foot. Pelvis stable to compression. No pain to palpation of step off to thoracic and lumbar spine.  Neuro: Alert. CN II-XII Grossly intact. GCS 15.  Skin: Skin is warm and dry. No rash noted. Not diaphoretic.   Psych: Normal mood and affect. Behavior is normal.         Emergency Department Course   ECG (14:28:47):  Rate 74 bpm. NV interval 180. QRS duration 88. QT/QTc 422/468. P-R-T axes 63 40 57. Normal sinus rhythm. Septal infarct, age undetermined. Abnormal ECG. Agree with computer interpretation. No significant changes from prior EKG on 12/11/14.  Interpreted by Ye George DO.     Imaging:  Radiographic findings were communicated with the patient and family who voiced understanding of the findings.     XR Chest, 2 views:  IMPRESSION: There are emphysematous and fibrotic changes in both lungs. There are no airspace opacities to suggest pneumonia. There is no pleural effusion or pneumothorax. Heart size is normal.    Per radiology report    Laboratory:  CBC: WNL (WBC 9.3, HGB 12.7, )   BMP: WNL (Creatinine 0.68)     Interventions:  1434: Tylenol 650 mg PO       Emergency Department Course:  Past medical records, nursing notes, and vitals reviewed.  1411: I performed an exam of the patient and obtained history, as documented above.     1415: I offered IV pain control for the patient but she declined this and requested only p.o. Tylenol.    IV inserted and blood drawn. This was sent to the lab for further testing, results above.   Above interventions provided.      The patient was sent for a XR while in the emergency department, findings above.     I personally reviewed the laboratory results with the Patient and family and answered all related questions prior to admission.       Findings and plan explained to the Patient and family who consents to admission.     1517: Discussed the patient with Dr. Decker, who will admit the patient to a medical bed for further monitoring,  evaluation, and treatment.      Impression & Plan    Medical Decision Making:  Rayne Mabry is a very pleasant 89 year old female who presents for evaluation of left pain after a mechanical fall. The patient has already received evaluation from [facility], this morning, and was found to have a left closed hip fracture by xray. Surgery has already been consulted and intervention is planned for tomorrow. She was sent here for rule out of any other injuries and to assist with admission. CMS is intact distally in the extremity. Chest XR reveals no other trauma or acute injury. The patients head to toe trauma exam is otherwise normal at this time and no further trauma workup is needed as I believe there is no signs of serious head, neck, spinal, extremity or abdominal injuries.   Will admit to medicine for further cares and ortho consultation. Dr. Decker accepted patient.  Pain control achieved.      Diagnosis:    ICD-10-CM    1. Closed fracture of left hip, initial encounter (H) S72.002A Basic metabolic panel       Disposition:  Admitted to Dr. Decker for further evaluation and care.      Scribe Disclosure:   Alejandro NEWELL, am serving as a scribe at 2:11 PM on 6/30/2019 to document services personally performed by Ye George DO based on my observations and the provider's statements to me.       Ye George,   6/30/2019    EMERGENCY DEPARTMENT       Ye George,   06/30/19 [id number]

## 2019-06-30 NOTE — ED NOTES
"[facility]  ED Nurse Handoff Report    ED Chief complaint: Hip Pain      ED Diagnosis:   Final diagnoses:   Closed fracture of left hip, initial encounter (H)       Code Status: Full Code    Allergies:   Allergies   Allergen Reactions     Zolpidem Other (See Comments)     Confusion even with low dose.       Activity level - Baseline/Home:  Assist with walker  Activity Level - Current:   Total Care    Patient's Preferred language:   English   Needed?: No    Isolation: No  Infection: Not Applicable  Bariatric?: No    Vital Signs:   Vitals:    06/30/19 1403 06/30/19 1433 06/30/19 1445   BP: 122/60 128/60 131/55   Pulse: 74 76 80   Resp: 16     Temp: 98.1  F (36.7  C)     TempSrc: Oral     SpO2: 99%  93%   Weight: 52.2 kg (115 lb)         Cardiac Rhythm: ,        Pain level: 0-10 Pain Scale: 7    Is this patient confused?: No   Does this patient have a guardian?  No         If yes, is there guardianship documents in the Epic \"Code/ACP\" activity?  N/A         Guardian Notified?  N/A  [place] - Suicide Severity Rating Scale Completed?  Yes  If yes, what color did the patient score?  White    Patient Report:   89 year old female with a history of CHF and hypothyroidism who presents to the emergency department with her family for evaluation of a left hip injury. The patient was at a family gathering yesterday at her sons home and was on her own going down a hallway with her walker. She got to the bathroom but was unable to get the walker through the door so she began to feel her way to the toilet without her walker. However, while doing this she had a mechanical fall to her left hip onto carpet. She did strike the back of her head but no LOC. She was unable to bear weight after the incident and required assistance from her son to transfer. She was able to socialize after the incident and went back to her assisted living facility after the party. This morning, her pain was worst so she went to " [facility] for evaluation and was found to have a left hip fracture.     Focused Assessment:   Alert and oriented times 3  Left hip pain CMS intact but hx of neuropathy of dmitriy LE  VSS   Tests Performed:   Labs  EKG  X-ray    Abnormal Results:   Abnormal Labs Reviewed   CBC WITH PLATELETS DIFFERENTIAL - Abnormal; Notable for the following components:       Result Value    RBC Count 3.68 (*)      (*)     MCH 34.5 (*)     Absolute Lymphocytes 0.4 (*)     All other components within normal limits     Treatments provided:   Tylenol 650mg PO    Family Comments:   At bedside    OBS brochure/video discussed/provided to patient/family: No              Name of person given brochure if not patient:                 Relationship to patient:       ED Medications:   Medications   acetaminophen (TYLENOL) tablet 650 mg (650 mg Oral Given 6/30/19 8044)       Drips infusing?:  No    For the majority of the shift this patient was [place].   Interventions performed were none.    Severe Sepsis OR Septic Shock Diagnosis Present: No    To be done/followed up on inpatient unit:        ED NURSE PHONE NUMBER:   [phone]

## 2019-06-30 NOTE — PROGRESS NOTES
RECEIVING UNIT ED HANDOFF REVIEW    ED Nurse Handoff Report was reviewed by: Mesha Don on June 30, 2019 at 4:06 PM

## 2019-06-30 NOTE — PHARMACY-ADMISSION MEDICATION HISTORY
Admission medication history interview status for the 6/30/2019  admission is complete. See EPIC admission navigator for prior to admission medications     Medication history source reliability:Good    Actions taken by pharmacist (provider contacted, etc): MAR received from [employer] from [facility]     Additional medication history information not noted on PTA med list :  - Bactrim DS 1 tablet po BID written 6/26 and not started until 6/27 am.  Has received 7 of 10 doses.    Medication reconciliation/reorder completed by provider prior to medication history? Yes    Time spent in this activity: 15 minutes    Prior to Admission medications    Medication Sig Last Dose Taking? Auth Provider   acetaminophen (TYLENOL) 325 MG tablet Take 650 mg by mouth 2 times daily as needed  prn Yes Reported, Patient   albuterol (PROAIR HFA/PROVENTIL HFA/VENTOLIN HFA) 108 (90 BASE) MCG/ACT Inhaler Inhale 1-2 puffs into the lungs every 4 hours as needed prn Yes Reported, Patient   azaTHIOprine (IMURAN) 50 MG tablet Take 150 mg by mouth daily 6/30/2019 at am Yes Reported, Patient   bismuth subsalicylate (PEPTO-BISMOL) 262 MG chewable tablet Take 2 tablets by mouth every hour as needed (max 8 tablets/24 hours) prn Yes Unknown, Entered By History   calcium carbonate 500 mg, elemental, (OSCAL;OYSTER SHELL CALCIUM) 500 MG tablet Take 500 mg by mouth 2 times daily 6/30/2019 at am Yes Unknown, Entered By History   ergocalciferol (ERGOCALCIFEROL) 85912 units capsule Take 50,000 Units by mouth once a week Every Tuesday 6/25/2019 Yes Unknown, Entered By History   levothyroxine (SYNTHROID/LEVOTHROID) 88 MCG tablet Take 88 mcg by mouth daily  6/30/2019 at am Yes Reported, Patient   loratadine (CLARITIN) 10 MG tablet Take 10 mg by mouth daily 6/30/2019 at am Yes Unknown, Entered By History   melatonin 3 MG tablet Take 3 mg by mouth At Bedtime  6/29/2019 at pm Yes Reported, Patient   omeprazole (PRILOSEC) 20 MG CR capsule Take 20 mg  by mouth daily Do not crush 6/30/2019 at am Yes Reported, Patient   senna-docusate (SENOKOT-S;PERICOLACE) 8.6-50 MG per tablet Take 1 tablet by mouth daily as needed  prn Yes Reported, Patient   sertraline (ZOLOFT) 50 MG tablet Take 50 mg by mouth At Bedtime  6/29/2019 at pm Yes Reported, Patient   sulfamethoxazole-trimethoprim (BACTRIM DS/SEPTRA DS) 800-160 MG tablet Take 1 tablet by mouth 2 times daily 6/30/2019 at am Yes Unknown, Entered By History   traMADol (ULTRAM) 50 MG tablet Take 50 mg by mouth every 8 hours as needed for severe pain prn Yes Unknown, Entered By History

## 2019-07-01 ENCOUNTER — ANESTHESIA EVENT (OUTPATIENT)
Dept: SURGERY | Facility: CLINIC | Age: 84
DRG: 481 | End: 2019-07-01
Payer: COMMERCIAL

## 2019-07-01 ENCOUNTER — ANESTHESIA (OUTPATIENT)
Dept: SURGERY | Facility: CLINIC | Age: 84
DRG: 481 | End: 2019-07-01
Payer: COMMERCIAL

## 2019-07-01 ENCOUNTER — APPOINTMENT (OUTPATIENT)
Dept: GENERAL RADIOLOGY | Facility: CLINIC | Age: 84
DRG: 481 | End: 2019-07-01
Attending: ORTHOPAEDIC SURGERY
Payer: COMMERCIAL

## 2019-07-01 PROBLEM — S72.002A DISPLACED FRACTURE OF LEFT FEMORAL NECK (H): Status: ACTIVE | Noted: 2019-07-01

## 2019-07-01 LAB
ANION GAP SERPL CALCULATED.3IONS-SCNC: 3 MMOL/L (ref 3–14)
BUN SERPL-MCNC: 13 MG/DL (ref 7–30)
CALCIUM SERPL-MCNC: 8.4 MG/DL (ref 8.5–10.1)
CHLORIDE SERPL-SCNC: 110 MMOL/L (ref 94–109)
CO2 SERPL-SCNC: 28 MMOL/L (ref 20–32)
CREAT SERPL-MCNC: 0.57 MG/DL (ref 0.52–1.04)
CREAT SERPL-MCNC: 0.65 MG/DL (ref 0.52–1.04)
GFR SERPL CREATININE-BSD FRML MDRD: 79 ML/MIN/{1.73_M2}
GFR SERPL CREATININE-BSD FRML MDRD: 82 ML/MIN/{1.73_M2}
GLUCOSE SERPL-MCNC: 88 MG/DL (ref 70–99)
PLATELET # BLD AUTO: 220 10E9/L (ref 150–450)
POTASSIUM SERPL-SCNC: 4 MMOL/L (ref 3.4–5.3)
SODIUM SERPL-SCNC: 141 MMOL/L (ref 133–144)

## 2019-07-01 PROCEDURE — 27210794 ZZH OR GENERAL SUPPLY STERILE: Performed by: ORTHOPAEDIC SURGERY

## 2019-07-01 PROCEDURE — 8E0YXBF COMPUTER ASSISTED PROCEDURE OF LOWER EXTREMITY, WITH FLUOROSCOPY: ICD-10-PCS | Performed by: ORTHOPAEDIC SURGERY

## 2019-07-01 PROCEDURE — 25800030 ZZH RX IP 258 OP 636: Performed by: NURSE ANESTHETIST, CERTIFIED REGISTERED

## 2019-07-01 PROCEDURE — 40000277 XR SURGERY CARM FLUORO LESS THAN 5 MIN W STILLS

## 2019-07-01 PROCEDURE — 36000065 ZZH SURGERY LEVEL 4 W FLUORO 1ST 30 MIN: Performed by: ORTHOPAEDIC SURGERY

## 2019-07-01 PROCEDURE — 25000128 H RX IP 250 OP 636: Performed by: ORTHOPAEDIC SURGERY

## 2019-07-01 PROCEDURE — C1713 ANCHOR/SCREW BN/BN,TIS/BN: HCPCS | Performed by: ORTHOPAEDIC SURGERY

## 2019-07-01 PROCEDURE — 25000128 H RX IP 250 OP 636: Performed by: ANESTHESIOLOGY

## 2019-07-01 PROCEDURE — 25800030 ZZH RX IP 258 OP 636: Performed by: ANESTHESIOLOGY

## 2019-07-01 PROCEDURE — 37000009 ZZH ANESTHESIA TECHNICAL FEE, EACH ADDTL 15 MIN: Performed by: ORTHOPAEDIC SURGERY

## 2019-07-01 PROCEDURE — 71000012 ZZH RECOVERY PHASE 1 LEVEL 1 FIRST HR: Performed by: ORTHOPAEDIC SURGERY

## 2019-07-01 PROCEDURE — C1769 GUIDE WIRE: HCPCS | Performed by: ORTHOPAEDIC SURGERY

## 2019-07-01 PROCEDURE — 0QS734Z REPOSITION LEFT UPPER FEMUR WITH INTERNAL FIXATION DEVICE, PERCUTANEOUS APPROACH: ICD-10-PCS | Performed by: ORTHOPAEDIC SURGERY

## 2019-07-01 PROCEDURE — 25800030 ZZH RX IP 258 OP 636: Performed by: ORTHOPAEDIC SURGERY

## 2019-07-01 PROCEDURE — 40000170 ZZH STATISTIC PRE-PROCEDURE ASSESSMENT II: Performed by: ORTHOPAEDIC SURGERY

## 2019-07-01 PROCEDURE — 36415 COLL VENOUS BLD VENIPUNCTURE: CPT | Performed by: ORTHOPAEDIC SURGERY

## 2019-07-01 PROCEDURE — 36415 COLL VENOUS BLD VENIPUNCTURE: CPT | Performed by: INTERNAL MEDICINE

## 2019-07-01 PROCEDURE — 80048 BASIC METABOLIC PNL TOTAL CA: CPT | Performed by: INTERNAL MEDICINE

## 2019-07-01 PROCEDURE — 37000008 ZZH ANESTHESIA TECHNICAL FEE, 1ST 30 MIN: Performed by: ORTHOPAEDIC SURGERY

## 2019-07-01 PROCEDURE — 25000132 ZZH RX MED GY IP 250 OP 250 PS 637: Performed by: ORTHOPAEDIC SURGERY

## 2019-07-01 PROCEDURE — 36000063 ZZH SURGERY LEVEL 4 EA 15 ADDTL MIN: Performed by: ORTHOPAEDIC SURGERY

## 2019-07-01 PROCEDURE — 25800030 ZZH RX IP 258 OP 636: Performed by: INTERNAL MEDICINE

## 2019-07-01 PROCEDURE — 25000132 ZZH RX MED GY IP 250 OP 250 PS 637: Performed by: INTERNAL MEDICINE

## 2019-07-01 PROCEDURE — 12000000 ZZH R&B MED SURG/OB

## 2019-07-01 PROCEDURE — 82565 ASSAY OF CREATININE: CPT | Performed by: ORTHOPAEDIC SURGERY

## 2019-07-01 PROCEDURE — 25000132 ZZH RX MED GY IP 250 OP 250 PS 637: Performed by: PHYSICIAN ASSISTANT

## 2019-07-01 PROCEDURE — 25000125 ZZHC RX 250: Performed by: ORTHOPAEDIC SURGERY

## 2019-07-01 PROCEDURE — 85049 AUTOMATED PLATELET COUNT: CPT | Performed by: ORTHOPAEDIC SURGERY

## 2019-07-01 PROCEDURE — 25000125 ZZHC RX 250: Performed by: NURSE ANESTHETIST, CERTIFIED REGISTERED

## 2019-07-01 PROCEDURE — 99232 SBSQ HOSP IP/OBS MODERATE 35: CPT | Performed by: PHYSICIAN ASSISTANT

## 2019-07-01 PROCEDURE — 25000128 H RX IP 250 OP 636: Performed by: NURSE ANESTHETIST, CERTIFIED REGISTERED

## 2019-07-01 DEVICE — IMPLANTABLE DEVICE
Type: IMPLANTABLE DEVICE | Site: FEMUR | Status: NON-FUNCTIONAL
Removed: 2019-08-27

## 2019-07-01 RX ORDER — NALOXONE HYDROCHLORIDE 0.4 MG/ML
.1-.4 INJECTION, SOLUTION INTRAMUSCULAR; INTRAVENOUS; SUBCUTANEOUS
Status: DISCONTINUED | OUTPATIENT
Start: 2019-07-01 | End: 2019-07-04 | Stop reason: HOSPADM

## 2019-07-01 RX ORDER — CEFAZOLIN SODIUM 1 G/3ML
1 INJECTION, POWDER, FOR SOLUTION INTRAMUSCULAR; INTRAVENOUS SEE ADMIN INSTRUCTIONS
Status: DISCONTINUED | OUTPATIENT
Start: 2019-07-01 | End: 2019-07-01 | Stop reason: HOSPADM

## 2019-07-01 RX ORDER — ONDANSETRON 2 MG/ML
4 INJECTION INTRAMUSCULAR; INTRAVENOUS EVERY 30 MIN PRN
Status: DISCONTINUED | OUTPATIENT
Start: 2019-07-01 | End: 2019-07-01 | Stop reason: HOSPADM

## 2019-07-01 RX ORDER — HYDROMORPHONE HYDROCHLORIDE 1 MG/ML
.3-.5 INJECTION, SOLUTION INTRAMUSCULAR; INTRAVENOUS; SUBCUTANEOUS EVERY 5 MIN PRN
Status: DISCONTINUED | OUTPATIENT
Start: 2019-07-01 | End: 2019-07-01 | Stop reason: HOSPADM

## 2019-07-01 RX ORDER — ONDANSETRON 2 MG/ML
4 INJECTION INTRAMUSCULAR; INTRAVENOUS EVERY 6 HOURS
Status: DISPENSED | OUTPATIENT
Start: 2019-07-01 | End: 2019-07-02

## 2019-07-01 RX ORDER — LIDOCAINE 40 MG/G
CREAM TOPICAL
Status: DISCONTINUED | OUTPATIENT
Start: 2019-07-01 | End: 2019-07-04 | Stop reason: HOSPADM

## 2019-07-01 RX ORDER — NALOXONE HYDROCHLORIDE 0.4 MG/ML
.1-.4 INJECTION, SOLUTION INTRAMUSCULAR; INTRAVENOUS; SUBCUTANEOUS
Status: ACTIVE | OUTPATIENT
Start: 2019-07-01 | End: 2019-07-02

## 2019-07-01 RX ORDER — SODIUM CHLORIDE, SODIUM LACTATE, POTASSIUM CHLORIDE, CALCIUM CHLORIDE 600; 310; 30; 20 MG/100ML; MG/100ML; MG/100ML; MG/100ML
INJECTION, SOLUTION INTRAVENOUS CONTINUOUS
Status: DISCONTINUED | OUTPATIENT
Start: 2019-07-01 | End: 2019-07-01 | Stop reason: HOSPADM

## 2019-07-01 RX ORDER — ACETAMINOPHEN 500 MG
1000 TABLET ORAL EVERY 8 HOURS
Status: DISCONTINUED | OUTPATIENT
Start: 2019-07-01 | End: 2019-07-04 | Stop reason: HOSPADM

## 2019-07-01 RX ORDER — FENTANYL CITRATE 50 UG/ML
25-50 INJECTION, SOLUTION INTRAMUSCULAR; INTRAVENOUS
Status: DISCONTINUED | OUTPATIENT
Start: 2019-07-01 | End: 2019-07-01 | Stop reason: HOSPADM

## 2019-07-01 RX ORDER — CELECOXIB 200 MG/1
200 CAPSULE ORAL 2 TIMES DAILY
Status: DISCONTINUED | OUTPATIENT
Start: 2019-07-01 | End: 2019-07-04 | Stop reason: HOSPADM

## 2019-07-01 RX ORDER — CEFAZOLIN SODIUM 1 G/3ML
1 INJECTION, POWDER, FOR SOLUTION INTRAMUSCULAR; INTRAVENOUS EVERY 8 HOURS
Status: COMPLETED | OUTPATIENT
Start: 2019-07-02 | End: 2019-07-02

## 2019-07-01 RX ORDER — AMOXICILLIN 250 MG
2 CAPSULE ORAL 2 TIMES DAILY
Status: DISCONTINUED | OUTPATIENT
Start: 2019-07-01 | End: 2019-07-04 | Stop reason: HOSPADM

## 2019-07-01 RX ORDER — BUPIVACAINE HYDROCHLORIDE 7.5 MG/ML
INJECTION, SOLUTION INTRASPINAL PRN
Status: DISCONTINUED | OUTPATIENT
Start: 2019-07-01 | End: 2019-07-01

## 2019-07-01 RX ORDER — MAGNESIUM HYDROXIDE 1200 MG/15ML
LIQUID ORAL PRN
Status: DISCONTINUED | OUTPATIENT
Start: 2019-07-01 | End: 2019-07-01 | Stop reason: HOSPADM

## 2019-07-01 RX ORDER — SODIUM CHLORIDE 9 MG/ML
INJECTION, SOLUTION INTRAVENOUS CONTINUOUS
Status: DISCONTINUED | OUTPATIENT
Start: 2019-07-01 | End: 2019-07-02

## 2019-07-01 RX ORDER — PROPOFOL 10 MG/ML
INJECTION, EMULSION INTRAVENOUS CONTINUOUS PRN
Status: DISCONTINUED | OUTPATIENT
Start: 2019-07-01 | End: 2019-07-01

## 2019-07-01 RX ORDER — ONDANSETRON 4 MG/1
4 TABLET, ORALLY DISINTEGRATING ORAL EVERY 30 MIN PRN
Status: DISCONTINUED | OUTPATIENT
Start: 2019-07-01 | End: 2019-07-01 | Stop reason: HOSPADM

## 2019-07-01 RX ORDER — AMOXICILLIN 250 MG
1 CAPSULE ORAL 2 TIMES DAILY
Status: DISCONTINUED | OUTPATIENT
Start: 2019-07-01 | End: 2019-07-04 | Stop reason: HOSPADM

## 2019-07-01 RX ORDER — HYDROMORPHONE HYDROCHLORIDE 1 MG/ML
0.2 INJECTION, SOLUTION INTRAMUSCULAR; INTRAVENOUS; SUBCUTANEOUS
Status: DISCONTINUED | OUTPATIENT
Start: 2019-07-01 | End: 2019-07-04 | Stop reason: HOSPADM

## 2019-07-01 RX ORDER — PROPOFOL 10 MG/ML
INJECTION, EMULSION INTRAVENOUS PRN
Status: DISCONTINUED | OUTPATIENT
Start: 2019-07-01 | End: 2019-07-01

## 2019-07-01 RX ORDER — CEFAZOLIN SODIUM 2 G/100ML
2 INJECTION, SOLUTION INTRAVENOUS
Status: COMPLETED | OUTPATIENT
Start: 2019-07-01 | End: 2019-07-01

## 2019-07-01 RX ORDER — ONDANSETRON 4 MG/1
4 TABLET, ORALLY DISINTEGRATING ORAL EVERY 6 HOURS PRN
Status: DISCONTINUED | OUTPATIENT
Start: 2019-07-01 | End: 2019-07-04 | Stop reason: HOSPADM

## 2019-07-01 RX ORDER — FENTANYL CITRATE 50 UG/ML
50-100 INJECTION, SOLUTION INTRAMUSCULAR; INTRAVENOUS
Status: DISCONTINUED | OUTPATIENT
Start: 2019-07-01 | End: 2019-07-01 | Stop reason: HOSPADM

## 2019-07-01 RX ORDER — ONDANSETRON 2 MG/ML
4 INJECTION INTRAMUSCULAR; INTRAVENOUS EVERY 6 HOURS PRN
Status: DISCONTINUED | OUTPATIENT
Start: 2019-07-01 | End: 2019-07-04 | Stop reason: HOSPADM

## 2019-07-01 RX ORDER — TRAMADOL HYDROCHLORIDE 50 MG/1
50 TABLET ORAL EVERY 6 HOURS PRN
Status: DISCONTINUED | OUTPATIENT
Start: 2019-07-01 | End: 2019-07-04 | Stop reason: HOSPADM

## 2019-07-01 RX ORDER — ONDANSETRON 2 MG/ML
INJECTION INTRAMUSCULAR; INTRAVENOUS PRN
Status: DISCONTINUED | OUTPATIENT
Start: 2019-07-01 | End: 2019-07-01

## 2019-07-01 RX ADMIN — OMEPRAZOLE 20 MG: 20 CAPSULE, DELAYED RELEASE ORAL at 08:22

## 2019-07-01 RX ADMIN — PHENYLEPHRINE HYDROCHLORIDE 0.25 MCG/KG/MIN: 10 INJECTION INTRAVENOUS at 15:35

## 2019-07-01 RX ADMIN — PROPOFOL 25 MCG/KG/MIN: 10 INJECTION, EMULSION INTRAVENOUS at 15:15

## 2019-07-01 RX ADMIN — ACETAMINOPHEN 1000 MG: 500 TABLET, FILM COATED ORAL at 21:42

## 2019-07-01 RX ADMIN — ONDANSETRON 4 MG: 2 INJECTION INTRAMUSCULAR; INTRAVENOUS at 15:40

## 2019-07-01 RX ADMIN — SERTRALINE HYDROCHLORIDE 50 MG: 50 TABLET ORAL at 21:43

## 2019-07-01 RX ADMIN — CEFAZOLIN 1 G: 1 INJECTION, POWDER, FOR SOLUTION INTRAMUSCULAR; INTRAVENOUS at 23:44

## 2019-07-01 RX ADMIN — SODIUM CHLORIDE: 9 INJECTION, SOLUTION INTRAVENOUS at 05:35

## 2019-07-01 RX ADMIN — SULFAMETHOXAZOLE AND TRIMETHOPRIM 1 TABLET: 800; 160 TABLET ORAL at 21:43

## 2019-07-01 RX ADMIN — RANITIDINE 150 MG: 150 TABLET ORAL at 21:43

## 2019-07-01 RX ADMIN — PHENYLEPHRINE HYDROCHLORIDE 100 MCG: 10 INJECTION INTRAVENOUS at 15:12

## 2019-07-01 RX ADMIN — CEFAZOLIN SODIUM 2 G: 2 INJECTION, SOLUTION INTRAVENOUS at 15:15

## 2019-07-01 RX ADMIN — OXYCODONE HYDROCHLORIDE 2.5 MG: 5 TABLET ORAL at 19:56

## 2019-07-01 RX ADMIN — LEVOTHYROXINE SODIUM 88 MCG: 88 TABLET ORAL at 08:22

## 2019-07-01 RX ADMIN — SODIUM CHLORIDE: 9 INJECTION, SOLUTION INTRAVENOUS at 23:46

## 2019-07-01 RX ADMIN — ONDANSETRON 4 MG: 2 INJECTION INTRAMUSCULAR; INTRAVENOUS at 21:44

## 2019-07-01 RX ADMIN — OXYCODONE HYDROCHLORIDE 2.5 MG: 5 TABLET ORAL at 08:22

## 2019-07-01 RX ADMIN — ACETAMINOPHEN 650 MG: 650 SUPPOSITORY RECTAL at 05:39

## 2019-07-01 RX ADMIN — SULFAMETHOXAZOLE AND TRIMETHOPRIM 1 TABLET: 800; 160 TABLET ORAL at 08:22

## 2019-07-01 RX ADMIN — SODIUM CHLORIDE: 9 INJECTION, SOLUTION INTRAVENOUS at 17:43

## 2019-07-01 RX ADMIN — SENNOSIDES AND DOCUSATE SODIUM 1 TABLET: 8.6; 5 TABLET ORAL at 21:45

## 2019-07-01 RX ADMIN — CELECOXIB 200 MG: 200 CAPSULE ORAL at 21:42

## 2019-07-01 RX ADMIN — DEXMEDETOMIDINE HYDROCHLORIDE 12 MCG: 100 INJECTION, SOLUTION INTRAVENOUS at 15:06

## 2019-07-01 RX ADMIN — BUPIVACAINE HYDROCHLORIDE IN DEXTROSE 12.5 MG: 7.5 INJECTION, SOLUTION SUBARACHNOID at 15:08

## 2019-07-01 RX ADMIN — PROPOFOL 10 MG: 10 INJECTION, EMULSION INTRAVENOUS at 15:06

## 2019-07-01 RX ADMIN — SODIUM CHLORIDE, POTASSIUM CHLORIDE, SODIUM LACTATE AND CALCIUM CHLORIDE: 600; 310; 30; 20 INJECTION, SOLUTION INTRAVENOUS at 12:37

## 2019-07-01 ASSESSMENT — ACTIVITIES OF DAILY LIVING (ADL)
ADLS_ACUITY_SCORE: 18
BATHING: 0-->INDEPENDENT
DRESS: 0-->INDEPENDENT
AMBULATION: 0-->INDEPENDENT
TOILETING: 0-->INDEPENDENT
TRANSFERRING: 0-->INDEPENDENT
SWALLOWING: 0-->SWALLOWS FOODS/LIQUIDS WITHOUT DIFFICULTY
ADLS_ACUITY_SCORE: 10
ADLS_ACUITY_SCORE: 18
RETIRED_EATING: 0-->INDEPENDENT
ADLS_ACUITY_SCORE: 14
ADLS_ACUITY_SCORE: 18
WHICH_OF_THE_ABOVE_FUNCTIONAL_RISKS_HAD_A_RECENT_ONSET_OR_CHANGE?: AMBULATION;TRANSFERRING;TOILETING;BATHING;DRESSING
FALL_HISTORY_WITHIN_LAST_SIX_MONTHS: NO
RETIRED_COMMUNICATION: 0-->UNDERSTANDS/COMMUNICATES WITHOUT DIFFICULTY
COGNITION: 0 - NO COGNITION ISSUES REPORTED

## 2019-07-01 ASSESSMENT — LIFESTYLE VARIABLES: TOBACCO_USE: 0

## 2019-07-01 ASSESSMENT — COPD QUESTIONNAIRES: COPD: 0

## 2019-07-01 ASSESSMENT — ENCOUNTER SYMPTOMS: SEIZURES: 0

## 2019-07-01 NOTE — H&P
Admitted:     06/30/2019      DATE OF SERVICE:  06/30/2019.      PRIMARY CARE PROVIDER:  Susan Hanley MD.      CHIEF COMPLAINT:  Right hip pain.      HISTORY OF PRESENT ILLNESS:  Had been obtained partially from the patient who is a relatively good historian.  I did discuss with the patient's daughter and son, who were present at the time of my examination.      Mrs. Rayne Mabry is a very pleasant 89-year-old female with past medical history of hypothyroidism, polyarteritis nodosa, polyneuropathy, gastroesophageal reflux disease, MAC infection, and arthritis, who was sent from [facility] after she was found to have a left hip fracture.      The patient lives in an assisted living facility.  She uses a walker for ambulation.  She does have mild cognitive impairment.  She states that yesterday she was at her son's house for some family event.  At some point, she had to use restroom, she got up with her walker.  She walked to the restroom, but at some point she could not maneuver her walker, so parked the walker and she tried to walk by herself.  She lost her balance and she fell on her back, hitting her head on a carpeted floor.  She did not lose her consciousness.  She called for help.  Her family members had her get up.  She spent the evening sitting on the seat of the walker.  Her family reported that she acted at her baseline, and she did not seem to have much pain.  In the morning, family took her to [facility] for evaluation and was found to have a left hip fracture.  I do not have an x-ray report available.      Upon further questioning, the patient denies losing her consciousness when she fell, it seems that this was a mechanical fall.  She denies any recent fevers, no headaches.  She does report having intermittent cough.  She denies any chest pain.  No shortness of breath.  No nausea, no vomiting, no abdominal pain.  She states that she had some dysuria recently.   She had a urinalysis checked by her primary care physician and it was thought she has a UTI.  She was put on Bactrim for a 5-days, which did not finish it yet.      In ER, she was seen by Dr. George.  Her initial vitals showed a blood pressure of 128/60, heart rate 76, respiratory rate 16, oxygen saturation 99% on room air and temperature 91.1.  She did have basic blood work in ER, which showed unremarkable BMP:  Sodium 140, potassium 4, chloride 106, bicarbonate 30, BUN 18, creatinine 0.68.  Her calcium was 9.5, anion gap of 4, glucose 87.  White blood cells 9.3, hemoglobin 12.7, hematocrit 37.3, platelet count 273.  Her chest x-ray showed emphysematous and fibrotic changes in both lungs.  No airspace opacities to suggest pneumonia.  Her EKG showed normal sinus rhythm at the rate of 74 beats per minute.      In the ER, she was given 1 dose of Tylenol 650 mg p.o. x 1 and Hospitalist Service was called regarding the admission.      PAST MEDICAL HISTORY:   1.  Hypothyroidism.   2.  History of polyarteritis nodosa, maintained on Imuran.   3.  MAC.   4.  Polyneuropathy.   5.  Gastroesophageal reflux disease.   6.  Chronic anemia.   7.  History of CHF.  Last echocardiogram in Care [facility]where showed a normal ejection fraction of 57% in 07/2017.      PAST SURGICAL HISTORY:   1.  Hysterectomy.   2.  Right hip surgery.      SOCIAL HISTORY:  She lives in an assisted living facility.  She states she never smoked.  She reports drinking 1 glass of wine at night.  She denies illicit drug abuse.      FAMILY HISTORY:  Reviewed with the patient and is noncontributory to the current admission.      PRIOR TO ADMISSION MEDICATIONS:    No current facility-administered medications on file prior to encounter.   Current Outpatient Medications on File Prior to Encounter:  acetaminophen (TYLENOL) 325 MG tablet Take 650 mg by mouth 2 times daily as needed    albuterol (PROAIR HFA/PROVENTIL HFA/VENTOLIN HFA) 108 (90 BASE) MCG/ACT Inhaler  Inhale 1-2 puffs into the lungs every 4 hours as needed   azaTHIOprine (IMURAN) 50 MG tablet Take 150 mg by mouth daily   bismuth subsalicylate (PEPTO-BISMOL) 262 MG chewable tablet Take 2 tablets by mouth every hour as needed (max 8 tablets/24 hours)   calcium carbonate 500 mg, elemental, (OSCAL;OYSTER SHELL CALCIUM) 500 MG tablet Take 500 mg by mouth 2 times daily   ergocalciferol (ERGOCALCIFEROL) 90018 units capsule Take 50,000 Units by mouth once a week Every Tuesday   levothyroxine (SYNTHROID/LEVOTHROID) 88 MCG tablet Take 88 mcg by mouth daily    loratadine (CLARITIN) 10 MG tablet Take 10 mg by mouth daily   melatonin 3 MG tablet Take 3 mg by mouth At Bedtime    omeprazole (PRILOSEC) 20 MG CR capsule Take 20 mg by mouth daily Do not crush   senna-docusate (SENOKOT-S;PERICOLACE) 8.6-50 MG per tablet Take 1 tablet by mouth daily as needed    sertraline (ZOLOFT) 50 MG tablet Take 50 mg by mouth At Bedtime    sulfamethoxazole-trimethoprim (BACTRIM DS/SEPTRA DS) 800-160 MG tablet Take 1 tablet by mouth 2 times daily   traMADol (ULTRAM) 50 MG tablet Take 50 mg by mouth every 8 hours as needed for severe pain         ALLERGIES:  SHE IS ALLERGIC TO ZOLPIDEM.      REVIEW OF SYSTEMS:  A 10-point review of systems was conducted and it was negative, except for pertinent positives mentioned in history of present illness.      PHYSICAL EXAMINATION:   VITAL SIGNS:  Blood pressure is 131/55, heart rate 76, respiratory rate 16, oxygen saturation 93% on room air, temperature 98.1.   GENERAL:  The patient is awake, alert, no acute distress at the time of my examination.   HEENT:  Normocephalic, atraumatic.  Pupils are equally round and reactive to light.  Oral mucosa is moist.   NECK:  Supple.  No cervical lymphadenopathy.  No thyromegaly.   CHEST:  There is bilateral air entry.  No wheezing, no rales, no crackles.   CARDIOVASCULAR:  There is normal S1 and S2, regular rate and rhythm.  No murmurs, no rubs.   ABDOMEN:  Soft,  nontender, nondistended.  Bowel sounds are present.   EXTREMITIES:  There is no leg swelling, no calf tenderness, 2+ peripheral pulses are palpable.   SKIN:  Intact, no rash, no cyanosis.   NEUROLOGIC:  The patient is awake, alert, oriented to self, place and time.  There are no focal neurological deficits.   PSYCHIATRIC:  Normal mood, normal affect.   MUSCULOSKELETAL:  She has limited range of motion of her left lower extremity, due to left hip pain.  She does not have any other joint abnormalities noted.      LABORATORY:  Reviewed and dictated above.      ASSESSMENT:  Mrs. Rayne Mabry is a very pleasant 89-year-old female with a past medical history of hypothyroidism, polyarteritis nodosa, polyneuropathy, MAC infection in the past, gastroesophageal reflux disease, CHF, and mild cognitive impairment, who was brought in after she was found to have a left hip fracture in [facility].      PLAN:   1.  Left hip fracture, status post mechanical fall.   She had a mechanical fall yesterday while she was at a family reunion.  When she was not using her walker, she lost her balance, and fell to the ground.  She did report hitting her head, but there was no loss of consciousness.  The floor was carpeted.  She is not on any anticoagulation.  She was able to enjoy the rest of the night with her family.  Her family took her to the [facility] earlier today and, as per report, she was found to have a left hip fracture.  Here in ER, she is comfortable.  Her blood work is quite unremarkable.  Her EKG shows normal sinus rhythm with no ischemic changes.  She seems optimized for surgery.  No further workup recommended at this time.  She is admitted to the surgical floor.  She will have a Rivas placed.  She will be placed on bed rest.  She can have some have something tonight.  She will be kept n.p.o. after midnight, along with IV fluids.  Ortho Consult requested.  Pain medications and antiemetics p.r.n. available.   2.   Hypothyroidism.  We will continue her prior to admission levothyroxine.   3.  Recent urinary tract infection.  She reported that she had urinary symptoms recently.  Her primary care physician put her on Bactrim.  Apparently, she took 7 doses out of 10.  We will continue the Bactrim for 3 more doses.  She does not have a fever here.  She does not have elevated leukocytosis.   4.  Gastroesophageal reflux disease.  We will continue her prior to admission Prilosec.   5.  History of polyarteritis nodosa.  She has been maintained on Imuran.  She does not have any active symptoms related to this.  I am holding her Imuran for now, given anticipated surgery.   6.  History of depression.  We will continue her prior to admission Zoloft.   7.  Mild cognitive impairment.  She is very pleasant.  Given her advanced age, she is at increased risk for developing delirium.  This was discussed with her family.  We will closely monitor her in postoperative period.   8.  DVT prophylaxis:  Pneumatic compression device for now.  We will defer DVT prophylaxis in postoperative period to Ortho.     9.  CODE STATUS was discussed with the patient and her daughter and her son.  The patient is DNR, although she should be FULL CODE for the planned OR intervention.         NOEL RODRIGUEZ MD             D: 2019   T: 2019   MT: JIAN      Name:     DORIS SAVAGE   MRN:      [id number]        Account:      [id number]   :      1930        Admitted:     2019                   Document: [id number]

## 2019-07-01 NOTE — ANESTHESIA POSTPROCEDURE EVALUATION
Patient: Rayne Mabry    Procedure(s):  PERCUTANEOUS FIXATION OF LEFT HIP WITH SYNTHES FEMORAL NECK SYSTEM    Diagnosis:HIP FRACTURE.  Diagnosis Additional Information: No value filed.    Anesthesia Type:  Spinal    Note:  Anesthesia Post Evaluation    Patient location during evaluation: PACU  Patient participation: Able to participate in evaluation but full recovery from regional anesthesia has not yet ocurrred but is anticipated to occur within 48 hours  Level of consciousness: awake  Pain management: adequate  Airway patency: patent  Cardiovascular status: acceptable  Respiratory status: acceptable  Hydration status: acceptable  PONV: controlled     Anesthetic complications: None          Last vitals:  Vitals:    07/01/19 1710 07/01/19 1720 07/01/19 1740   BP: 124/77 129/64 131/78   Pulse: 85 90    Resp: 16 17 18   Temp: 36.6  C (97.9  F) 36.7  C (98.1  F) 36.4  C (97.6  F)   SpO2: 94% 92%          Electronically Signed By: Yousif Carvalho MD  July 1, 2019  6:31 PM

## 2019-07-01 NOTE — PLAN OF CARE
Patient NPO since 12 MN, alert and oriented, very sensitive to narcotics per family, tylenol was administered for pain and discomfort,iv infusing, surgery time  yet be schedule, pure wick in place, continue to  monitor

## 2019-07-01 NOTE — ANESTHESIA CARE TRANSFER NOTE
Patient: Rayne Mabry    Procedure(s):  PERCUTANEOUS FIXATION OF LEFT HIP WITH SYNTHES FEMORAL NECK SYSTEM    Diagnosis: HIP FRACTURE.  Diagnosis Additional Information: No value filed.    Anesthesia Type:   Spinal     Note:  Airway :Face Mask  Patient transferred to:PACU  Comments: At end of procedure, spontaneous respirations, patient alert to voice, able to follow commands. Oxygen via facemask at 6 liters per minute to PACU. Oxygen tubing connected to wall O2 in PACU, SpO2, NiBP, and EKG monitors and alarms on and functioning, report on patient's clinical status given to PACU RN, RN questions answered.Handoff Report: Identifed the Patient, Identified the Reponsible Provider, Reviewed the pertinent medical history, Discussed the surgical course, Reviewed Intra-OP anesthesia mangement and issues during anesthesia, Set expectations for post-procedure period and Allowed opportunity for questions and acknowledgement of understanding      Vitals: (Last set prior to Anesthesia Care Transfer)    CRNA VITALS  7/1/2019 1549 - 7/1/2019 1625      7/1/2019             Resp Rate (set):  10                Electronically Signed By: HADLEY Dickens CRNA  July 1, 2019  4:25 PM

## 2019-07-01 NOTE — BRIEF OP NOTE
[facility]    Brief Operative Note    Pre-operative diagnosis: HIP FRACTURE.  Post-operative diagnosis same  Procedure: Procedure(s):  PERCUTANEOUS FIXATION OF LEFT HIP WITH SYNTHES FEMORAL NECK SYSTEM  Surgeon: Surgeon(s) and Role:     * Axel Curtis MD - Primary  Anesthesia: General   Estimated blood loss: Minimal  Drains: None  Specimens: * No specimens in log *  Findings:   None.  Complications: None.  Implants:    Implant Name Type Inv. Item Serial No.  Lot No. LRB No. Used   FEMORAL NECK SYSTEM PLATE 1 HOLE     [employer] [id number] Left 1   BOLT FOR FEMORAL NECK SYSTEM 85MM     [employer] [id number] Left 1   ANTIROTAION SCREW FOR FEMORAL NECK SYSTEM 85MM    [employer] [id number] Left 1   5.0MM TI LOCKING SCR SLF TPNG W/T25 STARDRIVE 38MM    [employer] [id number] Left 1

## 2019-07-01 NOTE — CONSULTS
Full note to follow.  Reviewed imaging and chart.  In brief, 89 year old female with a valgus impacted left femoral neck fracture.  Would benefit from stabilization with percutaneous screws to improve pain, improve mobility.  Will plan for surgery later today.

## 2019-07-01 NOTE — PROGRESS NOTES
[facility]    Hospitalist Progress Note      Assessment & Plan   Rayne Mabry is a 89 year old female with a past medical history significant for hypothyroidism, polyarteritis nodosa on Imuran, polyneuropathy, GERD, MAC infection, and arthritis who was admitted on 6/30/2019.     Left hip fracture, status post mechanical fall.   Patient had mechanical fall PTA while at family union and was not using her walker, she lost her balance, and fell to the ground.  She did report hitting her head, but there was no loss of consciousness.  She is not on any anticoagulation. Her family took her to [facility] on day of admission and she was found to have a left hip fracture. Laboratory evaluation in the ED is unremarkable.   --Orthopedics consulted, plan for surgery later today  --NPO  --pain control with tylenol, low dose oxycodone and dilaudid if needed  --NS @ 75/hr  --luna  --history of delirium in the hospital and is very sensitive to medications per family, recommend starting low with pain medication dosing. Defer to Orthopedics whether NSAIDS ok  --delirium prevention     Hypothyroidism.  Continue her prior to admission levothyroxine.     Recent urinary tract infection.  She reported that she had urinary symptoms recently.  Her primary care physician put her on Bactrim.  Apparently, she took 7/10 doses PTA. No fever or leukocytosis.   --continue Bactrim today to complete course    Gastroesophageal reflux disease.  Continue her prior to admission Prilosec.     History of polyarteritis nodosa.  She has been maintained on Imuran.  She does not have any active symptoms related to this.    --hold Imuran for now    History of depression.  Continue her prior to admission Zoloft.     Mild cognitive impairment.   History of delirium in the hospital per family. Typically oriented x4, but can be forgetful at times. We will closely monitor her in postoperative period.     DVT Prophylaxis: Pneumatic Compression  Devices  Code Status: DNR    Disposition: Expected discharge in 3 days, likely TCU, pending post-op course.     This patient was seen and examined with Dr. Reyes who agrees with the above plan.    Ambika Burnham PA-C    Interval History   Doing well this am. Reports significant improvement in pain with 2.5mg oxycodone. Daughter thinks she has tolerated pretty well though has had some mild confusion afterward. Denies urinary symptoms. Denies nausea. No CP, SOB. Reports chronic cough since MAC infection which is unchanged.     -Data reviewed today: I reviewed all new labs and imaging results over the last 24 hours. I personally reviewed no images or EKG's today.    Physical Exam   Temp: 98.6  F (37  C) Temp src: Oral BP: 120/59 Pulse: 80 Heart Rate: 79 Resp: 16 SpO2: 90 % O2 Device: None (Room air) Oxygen Delivery: 2 LPM  Vitals:    06/30/19 1403   Weight: 52.2 kg (115 lb)     Vital Signs with Ranges  Temp:  [98.1  F (36.7  C)-98.6  F (37  C)] 98.6  F (37  C)  Pulse:  [74-80] 80  Heart Rate:  [74-79] 79  Resp:  [16] 16  BP: (105-131)/(55-61) 120/59  SpO2:  [90 %-99 %] 90 %  I/O last 3 completed shifts:  In: 240 [P.O.:240]  Out: -     Constitutional: Alert and oriented to person, place, time situation. Appropriately conversant. Appears comfortable.   ENT: normal cephalic, moist mucous membranes  Respiratory: Lungs clear to auscultation bilaterally, no increased work of breathing or wheezing   Cardiovascular: Regular rate and rhythm, no murmur, no LE edema, distal pulses +2/4  GI:  active bowel sounds, abdomen soft, non-tender  Skin/Integumen: warm, dry  MSK:  Moves all four extremities. Can wiggle toes on both feet. Limited ROM LLE in light of fracture.   Neuro:  Cranial nerves 2-12 grossly intact. No focal deficits.      Medications     sodium chloride 75 mL/hr at 07/01/19 0535       levothyroxine  88 mcg Oral QAM AC     omeprazole  20 mg Oral Daily     sertraline  50 mg Oral At Bedtime     sodium chloride (PF)  3  mL Intracatheter Q8H     sulfamethoxazole-trimethoprim  1 tablet Oral BID       Data   Recent Labs   Lab 07/01/19  0649 06/30/19  1435   WBC  --  9.3   HGB  --  12.7   MCV  --  101*   PLT  --  273    140   POTASSIUM 4.0 4.0   CHLORIDE 110* 106   CO2 28 30   BUN 13 18   CR 0.65 0.68   ANIONGAP 3 4   MICHELLE 8.4* 9.5   GLC 88 87       Recent Results (from the past 24 hour(s))   XR Chest 1 View    Narrative    CHEST ONE VIEW 6/30/2019 3:02 PM     COMPARISON: Two-view chest x-ray 5/23/2011.    HISTORY: Pre-op      Impression    IMPRESSION: There are emphysematous and fibrotic changes in both  lungs. There are no airspace opacities to suggest pneumonia. There is  no pleural effusion or pneumothorax. Heart size is normal.    CLAIR NICHOLS MD

## 2019-07-01 NOTE — PLAN OF CARE
Patient arrived to unit from ED ~1710. NA&Ox4. VS stable, O2 88% on room air, patients family states this is her baseline. Tolerating diet, will be NPO at midnight for planned surgery tomorrow. Purewick in place. Tylenol given for pain management.

## 2019-07-01 NOTE — ANESTHESIA PROCEDURE NOTES
Peripheral nerve/Neuraxial procedure note : intrathecal  Pre-Procedure  Performed by Ye Martins MD  Location: OR      Pre-Anesthestic Checklist: patient identified, IV checked, risks and benefits discussed, informed consent, monitors and equipment checked and pre-op evaluation    Timeout  Correct Patient: Yes   Correct Procedure: Yes   Correct Site: Yes   Correct Laterality: N/A   Correct Position: Yes   Site Marked: N/A   .   Procedure Documentation    .    Procedure:    Intrathecal.  Insertion Site:L3-4  (midline approach)      Patient Prep;mask, sterile gloves, povidone-iodine 7.5% surgical scrub, patient draped.  .  Needle: Eddie tip Spinal Needle (gauge): 25  Spinal/LP Needle Length (inches): 3.5 # of attempts: 1 and # of redirects:  Introducer used .       Assessment/Narrative  Paresthesias: No.  .  .  clear CSF fluid removed . Comments:  No pain with injection, questions answered about procedure.

## 2019-07-01 NOTE — PLAN OF CARE
Pt A/OX4, forgetful. VSS, O2 2L. Reports pain 7/10 prior to surgery using oxycodone and denies pain after surgery. CMS intact except numbness in bilateral legs, improving. Rivas patent. Dressing c/d/I.

## 2019-07-01 NOTE — CONSULTS
[facility]    Orthopedic Consultation    Rayne Mabry MRN# [id number]   Age: 89 year old YOB: 1930     Date of Admission:  6/30/2019    Reason for consult: Left hip fracture       Requesting physician: Dr. Decker       Level of consult: Consult, follow and place orders           Assessment and Plan:   Assessment:   Left femoral neck fracture, valgus impacted      Plan:   Plan for CRPP with Dr. Curtis for left femoral neck fracture today  NPO  Bed rest  Pain medication as needed  Not on any anticoagulation  Pre-op optimization per hospitalist           Chief Complaint:   Hip fracture         History of Present Illness:   This patient is a 89 year old female who presents with the following condition requiring a hospital admission:    Patient was at her son's home yesterday for a family get together when she lost her balance in the hallway, walking without her walker as she usually does. She fell and landed on her left hip resulting in hip pain and inability to stand or walk without assist. She was actually ok pain wise and sat and socialized with people after the fall. She was brought back to her [facility] ([place] in [place]) and still had somewhat significant pain in the left hip. She was brought to [facility] for further evaluation and found to have a left femoral neck fracture.   She is not chronically anticoagulated.  Normally uses a walker for assist but does go some distances sometimes without it.  Lives at [facility].          Past Medical History:     Past Medical History:   Diagnosis Date     Arthritis     rheumatoid arthritis     Depression      MAC (mycobacterium avium-intracellulare complex)      Polyarteritis nodosa (H)      Thyroid disease              Past Surgical History:     Past Surgical History:   Procedure Laterality Date     GYN SURGERY      hyster             Social History:     Social History     Tobacco Use     Smoking status: Never Smoker     Smokeless tobacco:  Never Used   Substance Use Topics     Alcohol use: Yes     Comment: 1 glass wine a night             Family History:   No family history on file.          Immunizations:     VACCINE/DOSE   Diptheria   DPT   DTAP   HBIG   Hepatitis A   Hepatitis B   HIB   Influenza   Measles   Meningococcal   MMR   Mumps   Pneumococcal   Polio   Rubella   Small Pox   TDAP   Varicella   Zoster             Allergies:     Allergies   Allergen Reactions     Zolpidem Other (See Comments)     Confusion even with low dose.             Medications:     Current Facility-Administered Medications   Medication     acetaminophen (TYLENOL) Suppository 650 mg     acetaminophen (TYLENOL) tablet 650 mg     albuterol (PROAIR HFA/PROVENTIL HFA/VENTOLIN HFA) 108 (90 Base) MCG/ACT inhaler 1-2 puff     bismuth subsalicylate (PEPTO BISMOL) chewable tablet 524 mg     HYDROmorphone (PF) (DILAUDID) injection 0.2-0.4 mg     levothyroxine (SYNTHROID/LEVOTHROID) tablet 88 mcg     lidocaine (LMX4) cream     lidocaine 1 % 0.1-1 mL     melatonin tablet 3 mg     naloxone (NARCAN) injection 0.1-0.4 mg     omeprazole (priLOSEC) CR capsule 20 mg     ondansetron (ZOFRAN-ODT) ODT tab 4 mg    Or     ondansetron (ZOFRAN) injection 4 mg     oxyCODONE IR (ROXICODONE) half-tab 2.5-5 mg     polyethylene glycol (MIRALAX/GLYCOLAX) Packet 17 g     potassium chloride (KLOR-CON) Packet 20-40 mEq     potassium chloride 10 mEq in 100 mL intermittent infusion with 10 mg lidocaine     potassium chloride 10 mEq in 100 mL sterile water intermittent infusion (premix)     potassium chloride ER (K-DUR/KLOR-CON M) CR tablet 20-40 mEq     senna-docusate (SENOKOT-S/PERICOLACE) 8.6-50 MG per tablet 1 tablet    Or     senna-docusate (SENOKOT-S/PERICOLACE) 8.6-50 MG per tablet 2 tablet     sertraline (ZOLOFT) tablet 50 mg     sodium chloride (PF) 0.9% PF flush 3 mL     sodium chloride (PF) 0.9% PF flush 3 mL     sodium chloride 0.9% infusion     sulfamethoxazole-trimethoprim (BACTRIM DS/SEPTRA  DS) 800-160 MG per tablet 1 tablet             Review of Systems:   CV: NEGATIVE for chest pain, palpitations or peripheral edema  C: NEGATIVE for fever, chills, change in weight  E/M: NEGATIVE for ear, mouth and throat problems  R: NEGATIVE for significant cough or SOB          Physical Exam:   All vitals have been reviewed  Patient Vitals for the past 24 hrs:   BP Temp Temp src Pulse Heart Rate Resp SpO2 Weight   07/01/19 0815 118/67 97.8  F (36.6  C) Oral -- 80 16 92 % --   07/01/19 0100 120/59 98.6  F (37  C) Oral 80 -- 16 90 % --   06/30/19 1710 105/61 98.4  F (36.9  C) Oral -- 79 16 98 % --   06/30/19 1445 131/55 -- -- 80 -- -- 93 % --   06/30/19 1433 128/60 -- -- 76 -- -- -- --   06/30/19 1403 122/60 98.1  F (36.7  C) Oral 74 74 16 99 % 52.2 kg (115 lb)       Intake/Output Summary (Last 24 hours) at 7/1/2019 0919  Last data filed at 6/30/2019 1900  Gross per 24 hour   Intake 240 ml   Output --   Net 240 ml     Patient resting comfortably in bed  No leg length discrepancy  No int/ext rotation noted  Minimal erythema of the surrounding skin.   Bilateral calves are soft, non-tender.  Bilateral lower extremity is NVI.  Sensation intact bilateral lower extremities  5/5 motor with resisted dorsi and plantar flexion bilaterally  +Dp pulse            Data:   All laboratory data reviewed  Results for orders placed or performed during the hospital encounter of 06/30/19   XR Chest 1 View    Narrative    CHEST ONE VIEW 6/30/2019 3:02 PM     COMPARISON: Two-view chest x-ray 5/23/2011.    HISTORY: Pre-op      Impression    IMPRESSION: There are emphysematous and fibrotic changes in both  lungs. There are no airspace opacities to suggest pneumonia. There is  no pleural effusion or pneumothorax. Heart size is normal.    CLAIR NICHOLS MD   CBC with platelets differential   Result Value Ref Range    WBC 9.3 4.0 - 11.0 10e9/L    RBC Count 3.68 (L) 3.8 - 5.2 10e12/L    Hemoglobin 12.7 11.7 - 15.7 g/dL    Hematocrit 37.3 35.0 -  47.0 %     (H) 78 - 100 fl    MCH 34.5 (H) 26.5 - 33.0 pg    MCHC 34.0 31.5 - 36.5 g/dL    RDW 14.6 10.0 - 15.0 %    Platelet Count 273 150 - 450 10e9/L    Diff Method Automated Method     % Neutrophils 87.4 %    % Lymphocytes 4.3 %    % Monocytes 5.3 %    % Eosinophils 2.3 %    % Basophils 0.4 %    % Immature Granulocytes 0.3 %    Nucleated RBCs 0 0 /100    Absolute Neutrophil 8.1 1.6 - 8.3 10e9/L    Absolute Lymphocytes 0.4 (L) 0.8 - 5.3 10e9/L    Absolute Monocytes 0.5 0.0 - 1.3 10e9/L    Absolute Eosinophils 0.2 0.0 - 0.7 10e9/L    Absolute Basophils 0.0 0.0 - 0.2 10e9/L    Abs Immature Granulocytes 0.0 0 - 0.4 10e9/L    Absolute Nucleated RBC 0.0    Basic metabolic panel   Result Value Ref Range    Sodium 140 133 - 144 mmol/L    Potassium 4.0 3.4 - 5.3 mmol/L    Chloride 106 94 - 109 mmol/L    Carbon Dioxide 30 20 - 32 mmol/L    Anion Gap 4 3 - 14 mmol/L    Glucose 87 70 - 99 mg/dL    Urea Nitrogen 18 7 - 30 mg/dL    Creatinine 0.68 0.52 - 1.04 mg/dL    GFR Estimate 77 >60 mL/min/[1.73_m2]    GFR Estimate If Black 90 >60 mL/min/[1.73_m2]    Calcium 9.5 8.5 - 10.1 mg/dL   Basic metabolic panel   Result Value Ref Range    Sodium 141 133 - 144 mmol/L    Potassium 4.0 3.4 - 5.3 mmol/L    Chloride 110 (H) 94 - 109 mmol/L    Carbon Dioxide 28 20 - 32 mmol/L    Anion Gap 3 3 - 14 mmol/L    Glucose 88 70 - 99 mg/dL    Urea Nitrogen 13 7 - 30 mg/dL    Creatinine 0.65 0.52 - 1.04 mg/dL    GFR Estimate 79 >60 mL/min/[1.73_m2]    GFR Estimate If Black >90 >60 mL/min/[1.73_m2]    Calcium 8.4 (L) 8.5 - 10.1 mg/dL   EKG 12-lead, tracing only   Result Value Ref Range    Interpretation ECG Click View Image link to view waveform and result    Orthopedics IP Consult: Patient to be seen: Routine - within 24 hours; left hip fracture; Consultant may enter orders: Yes; Requesting provider? Attending physician    Narrative    Axel Curtis MD     7/1/2019  6:44 AM  Full note to follow.  Reviewed imaging and chart.   In brief, 89   year old female with a valgus impacted left femoral neck   fracture.  Would benefit from stabilization with percutaneous   screws to improve pain, improve mobility.  Will plan for surgery   later today.            Attestation:  I have reviewed today's vital signs, notes, medications, labs and imaging with Dr. Curtis.  Amount of time performed on this consult: 25 minutes.    Mulu Andino PA-C

## 2019-07-01 NOTE — ANESTHESIA PREPROCEDURE EVALUATION
Anesthesia Pre-Procedure Evaluation    Patient: Rayne Mabry   MRN: [id number] : 1930          Preoperative Diagnosis: HIP FRACTURE.    Procedure(s):  LEFT VALGUS IMPACTED NECK FRACTURE.(SYNTHES CANNULATED SCREW SET,C-ARM)    Past Medical History:   Diagnosis Date     Arthritis     rheumatoid arthritis     Depression      MAC (mycobacterium avium-intracellulare complex)      Polyarteritis nodosa (H)      Thyroid disease      Past Surgical History:   Procedure Laterality Date     GYN SURGERY      hyster       Anesthesia Evaluation     . Pt has had prior anesthetic. Type: General    History of anesthetic complications    Delirium      ROS/MED HX    ENT/Pulmonary:     (+), recent URI MAC: . .   (-) tobacco use, asthma, COPD and sleep apnea   Neurologic:     (+)dementia,    (-) seizures, CVA and TIA   Cardiovascular:     (+) ----. : . CHF . . :. .      (-) hypertension, CAD, pacemaker, pacemaker and ICD   METS/Exercise Tolerance:     Hematologic: Comments: Polyarteritis nodosa - neg hematologic  ROS       Musculoskeletal:   (+) arthritis,  -       GI/Hepatic:     (+) GERD      (-) liver disease   Renal/Genitourinary:      (-) renal disease   Endo:     (+) thyroid problem hypothyroidism, .   (-) Type I DM and Type II DM   Psychiatric:     (+) psychiatric history depression      Infectious Disease:         Malignancy:         Other:                          Physical Exam      Airway   Mallampati: II  TM distance: >3 FB  Neck ROM: full    Dental   (+) missing and chipped  Comment: Front teeth are solid    Cardiovascular   Rhythm and rate: regular and normal  (-) no murmur    Pulmonary    breath sounds clear to auscultation            Lab Results   Component Value Date    WBC 9.3 2019    HGB 12.7 2019    HCT 37.3 2019     2019    CRP 18.5 (H) 2011    SED 31 (H) 2011     2019    POTASSIUM 4.0 2019    CHLORIDE 110 (H) 2019    CO2 28 2019     "BUN 13 07/01/2019    CR 0.65 07/01/2019    GLC 88 07/01/2019    MICHELLE 8.4 (L) 07/01/2019    ALBUMIN 2.9 (L) 05/15/2011    PROTTOTAL 5.9 (L) 05/15/2011    ALT 36 05/15/2011    AST 74 (H) 05/15/2011    ALKPHOS 91 05/15/2011    BILITOTAL 0.3 05/15/2011    PTT 27 01/13/2006    INR 1.00 12/11/2014    TSH 5.27 (H) 09/18/2017    T4 0.95 05/15/2011       Preop Vitals  BP Readings from Last 3 Encounters:   07/01/19 118/67   10/09/17 93/63   10/04/17 102/65    Pulse Readings from Last 3 Encounters:   07/01/19 80   10/09/17 91   10/04/17 86      Resp Readings from Last 3 Encounters:   07/01/19 16   10/09/17 16   10/04/17 16    SpO2 Readings from Last 3 Encounters:   07/01/19 92%   10/09/17 95%   10/04/17 98%      Temp Readings from Last 1 Encounters:   07/01/19 36.6  C (97.8  F) (Oral)    Ht Readings from Last 1 Encounters:   08/30/17 1.651 m (5' 5\")      Wt Readings from Last 1 Encounters:   06/30/19 52.2 kg (115 lb)    Estimated body mass index is 19.14 kg/m  as calculated from the following:    Height as of 8/30/17: 1.651 m (5' 5\").    Weight as of this encounter: 52.2 kg (115 lb).       Anesthesia Plan      History & Physical Review  History and physical reviewed and following examination; no interval change.    ASA Status:  3 .    NPO Status:  > 8 hours    Plan for Spinal   PONV prophylaxis:  Ondansetron (or other 5HT-3)  No versed, very sensitive to medications  Precedex/Ketamine for spinal  DNR rescinded during surgery after discussion with patient and family.      Postoperative Care  Postoperative pain management:  IV analgesics.      Consents  Anesthetic plan, risks, benefits and alternatives discussed with:  Patient.  Use of blood products discussed: Yes.   Use of blood products discussed with Patient and Daughter/Son.  Consented to blood products.  .                 Ye Martins MD  "

## 2019-07-02 ENCOUNTER — APPOINTMENT (OUTPATIENT)
Dept: PHYSICAL THERAPY | Facility: CLINIC | Age: 84
DRG: 481 | End: 2019-07-02
Payer: COMMERCIAL

## 2019-07-02 ENCOUNTER — APPOINTMENT (OUTPATIENT)
Dept: PHYSICAL THERAPY | Facility: CLINIC | Age: 84
DRG: 481 | End: 2019-07-02
Attending: ORTHOPAEDIC SURGERY
Payer: COMMERCIAL

## 2019-07-02 LAB
GLUCOSE SERPL-MCNC: 86 MG/DL (ref 70–99)
HGB BLD-MCNC: 11.4 G/DL (ref 11.7–15.7)

## 2019-07-02 PROCEDURE — 85018 HEMOGLOBIN: CPT | Performed by: ORTHOPAEDIC SURGERY

## 2019-07-02 PROCEDURE — 25000132 ZZH RX MED GY IP 250 OP 250 PS 637: Performed by: ORTHOPAEDIC SURGERY

## 2019-07-02 PROCEDURE — 97110 THERAPEUTIC EXERCISES: CPT | Mod: GP | Performed by: PHYSICAL THERAPIST

## 2019-07-02 PROCEDURE — 25000128 H RX IP 250 OP 636: Performed by: ORTHOPAEDIC SURGERY

## 2019-07-02 PROCEDURE — 36415 COLL VENOUS BLD VENIPUNCTURE: CPT | Performed by: ORTHOPAEDIC SURGERY

## 2019-07-02 PROCEDURE — 82947 ASSAY GLUCOSE BLOOD QUANT: CPT | Performed by: ORTHOPAEDIC SURGERY

## 2019-07-02 PROCEDURE — 12000000 ZZH R&B MED SURG/OB

## 2019-07-02 PROCEDURE — 97530 THERAPEUTIC ACTIVITIES: CPT | Mod: GP | Performed by: PHYSICAL THERAPIST

## 2019-07-02 PROCEDURE — 99232 SBSQ HOSP IP/OBS MODERATE 35: CPT | Performed by: PHYSICIAN ASSISTANT

## 2019-07-02 PROCEDURE — 97161 PT EVAL LOW COMPLEX 20 MIN: CPT | Mod: GP | Performed by: PHYSICAL THERAPIST

## 2019-07-02 PROCEDURE — 97116 GAIT TRAINING THERAPY: CPT | Mod: GP | Performed by: PHYSICAL THERAPIST

## 2019-07-02 RX ORDER — OXYCODONE HYDROCHLORIDE 5 MG/1
2.5-5 TABLET ORAL EVERY 4 HOURS PRN
Qty: 20 TABLET | Refills: 0 | Status: SHIPPED | OUTPATIENT
Start: 2019-07-02 | End: 2019-08-26

## 2019-07-02 RX ORDER — ACETAMINOPHEN 325 MG/1
650 TABLET ORAL EVERY 4 HOURS PRN
Qty: 30 TABLET | Refills: 0 | Status: SHIPPED | OUTPATIENT
Start: 2019-07-02 | End: 2019-09-10 | Stop reason: DRUGHIGH

## 2019-07-02 RX ORDER — ONDANSETRON 4 MG/1
4 TABLET, ORALLY DISINTEGRATING ORAL EVERY 6 HOURS PRN
Qty: 8 TABLET | Refills: 0 | Status: SHIPPED | OUTPATIENT
Start: 2019-07-02 | End: 2019-08-26

## 2019-07-02 RX ADMIN — CEFAZOLIN 1 G: 1 INJECTION, POWDER, FOR SOLUTION INTRAMUSCULAR; INTRAVENOUS at 08:27

## 2019-07-02 RX ADMIN — SERTRALINE HYDROCHLORIDE 50 MG: 50 TABLET ORAL at 21:03

## 2019-07-02 RX ADMIN — ACETAMINOPHEN 1000 MG: 500 TABLET, FILM COATED ORAL at 13:14

## 2019-07-02 RX ADMIN — LEVOTHYROXINE SODIUM 88 MCG: 88 TABLET ORAL at 06:30

## 2019-07-02 RX ADMIN — ACETAMINOPHEN 1000 MG: 500 TABLET, FILM COATED ORAL at 21:02

## 2019-07-02 RX ADMIN — OMEPRAZOLE 20 MG: 20 CAPSULE, DELAYED RELEASE ORAL at 08:27

## 2019-07-02 RX ADMIN — ENOXAPARIN SODIUM 40 MG: 40 INJECTION SUBCUTANEOUS at 14:51

## 2019-07-02 RX ADMIN — CELECOXIB 200 MG: 200 CAPSULE ORAL at 21:03

## 2019-07-02 RX ADMIN — CELECOXIB 200 MG: 200 CAPSULE ORAL at 08:27

## 2019-07-02 RX ADMIN — RANITIDINE 150 MG: 150 TABLET ORAL at 21:02

## 2019-07-02 RX ADMIN — OXYCODONE HYDROCHLORIDE 2.5 MG: 5 TABLET ORAL at 08:27

## 2019-07-02 RX ADMIN — ONDANSETRON 4 MG: 2 INJECTION INTRAMUSCULAR; INTRAVENOUS at 04:52

## 2019-07-02 RX ADMIN — ACETAMINOPHEN 1000 MG: 500 TABLET, FILM COATED ORAL at 06:20

## 2019-07-02 RX ADMIN — SENNOSIDES AND DOCUSATE SODIUM 2 TABLET: 8.6; 5 TABLET ORAL at 21:02

## 2019-07-02 ASSESSMENT — ACTIVITIES OF DAILY LIVING (ADL)
ADLS_ACUITY_SCORE: 18
ADLS_ACUITY_SCORE: 11
ADLS_ACUITY_SCORE: 14
ADLS_ACUITY_SCORE: 11
ADLS_ACUITY_SCORE: 11
ADLS_ACUITY_SCORE: 14

## 2019-07-02 ASSESSMENT — MIFFLIN-ST. JEOR: SCORE: 940

## 2019-07-02 NOTE — OP NOTE
Procedure Date: 07/01/2019      PREOPERATIVE DIAGNOSIS:  Left valgus impacted femoral neck fracture.      POSTOPERATIVE DIAGNOSIS:  Left valgus impacted femoral neck fracture.      PROCEDURE:  Percutaneous screw fixation of left valgus impacted femoral neck fracture.      SURGEON:  Axel Curtis MD.      INDICATION FOR SURGERY:  Ms. Mabry is a very pleasant 89-year-old female with a valgus impacted left femoral neck fracture after a fall at home.  She presented initially to the orthopedic urgent care and then was referred to [facility] after x-rays revealed a valgus impacted left femoral neck fracture.  She was admitted now and I was asked to evaluate for definitive care.  We discussed treatment options.  Given her age, activity, and nature of the fracture, I think she would benefit from percutaneous screw fixation of the fracture to stabilize the fracture, for improve pain management, and improve healing.  She understands the risks, benefits and alternatives of surgery and wished to proceed with surgery.      NARRATIVE EVENTS:  After thorough preoperative evaluation and proper identification of the patient and extremity to be operated on, Ms. Mabry was taken to the operating room where she underwent spinal anesthetic, placed supine on the fracture table and her left hip was prepped and draped in the usual sterile manner.  After appropriate surgical pause to confirm the patient's extremity to be operative on, the patient received 2 g of Ancef.  Her left hip was evaluated under biplanar fluoroscopy.        We then made a small nick incision on the lateral aspect of the femur at about the level of the lesser trochanter.  Skin and soft tissue were sharply dissected down to the lateral femur where the 135 guide for the Synthes femoral neck system plate was placed and screw was placed on the lateral cortex of the femur in aim such that the guide pin would be placed in the center of the femoral head  on AP and lateral views of the femur.  Once we placed the pin in position and confirmed using biplanar fluoroscopy, we measured for a 85 mm screw and then placed the blade screw on the femoral neck bolt into position over the 1-hole sideplate.  We then placed our anterior rotational screw into position, also an 85 mm screw.  Once this was done, we then placed a 40 mm screw in the plate, it was a bicortical screw across the shaft.  Once this was done, we then removed the aiming arm from the femur, checked the position of the hardware and fracture reduction.  We felt it to be in anatomic reduction and appropriate hardware placement, checked the hardware under live fluoroscopy, and felt the fracture moved as a single unit.  At this point, we then thoroughly irrigated the wound, closed in layers with absorbable sutures and staples in the skin.  The patient was placed in a well-padded postoperative dressing and taken to the recovery room in stable condition.  She tolerated the procedure without difficulty.         SEBASTIAN SANDOVAL MD             D: 2019   T: 2019   MT: BECCA      Name:     DORIS SAVAGE   MRN:      [id number]        Account:        [id number]   :      1930           Procedure Date: 2019      Document: [id number]

## 2019-07-02 NOTE — PROGRESS NOTES
Orthopedic Surgery  Rayne Mabry  2019  Admit Date:  2019  POD 1 Day Post-Op  S/P Procedure(s):  PERCUTANEOUS FIXATION OF LEFT HIP WITH SYNTHES FEMORAL NECK SYSTEM    Patient resting comfortably in chair.    Pain controlled.  Tolerating oral intake.    Denies nausea or vomiting  Denies chest pain or shortness of breath  No events overnight.     Alert and orient to person, place, and time.  Vital Sign Ranges  Temperature Temp  Av.9  F (36.6  C)  Min: 96.8  F (36  C)  Max: 99.3  F (37.4  C)   Blood pressure Systolic (24hrs), Av , Min:97 , Max:165        Diastolic (24hrs), Av, Min:60, Max:93      Pulse Pulse  Av.3  Min: 79  Max: 90   Respirations Resp  Av.8  Min: 14  Max: 33   Pulse oximetry SpO2  Av.4 %  Min: 86 %  Max: 100 %       Dressing is clean, dry, and intact.   Minimal erythema of the surrounding skin.   Bilateral calves are soft, non-tender.  Bilateral lower extremity is NVI.  Sensation intact bilateral lower extremities  5/5 motor with resisted dorsi and plantar flexion bilaterally  +Dp pulse    Labs:  Recent Labs   Lab Test 19  0649 19  1435 17   POTASSIUM 4.0 4.0 3.2*     Recent Labs   Lab Test 19  0635 19  1435 17   HGB 11.4* 12.7 12.7     Recent Labs   Lab Test 14  2200   INR 1.00     Recent Labs   Lab Test 19  2207 19  1435 17    273 424       A/P  1. S/p left valgus impacted femoral neck fracture with CRPP   Continue Lovenox for DVT prophylaxis.  ASA on discharge    Mobilize with PT/OT    WBAT with walker.     Continue current pain regiment.   Leave dressing intact    2. Disposition   Anticipate d/c to Assisted living vs TCU based on progress with PT and options for assist at AL.    Mulu Andino PA-C

## 2019-07-02 NOTE — PLAN OF CARE
Pt A+Ox4 but forgetful. Pt VSS on 2L. Pt on regular diet tolerating well. Pt pulled out IV while sleeping at 0500 and has a new one. Pt CMS intact ex slight numbness in both legs. Discharge pending.

## 2019-07-02 NOTE — PROGRESS NOTES
[facility]    Hospitalist Progress Note      Assessment & Plan   Rayne Mabry is a 89 year old female with a past medical history significant for hypothyroidism, polyarteritis nodosa on Imuran, polyneuropathy, GERD, MAC infection, and arthritis who was admitted on 6/30/2019.      Mechanical fall with left hip fracture s/p percutaneous fixation 7/1/219  Patient had mechanical fall PTA while at family reunion and was not using her walker, she lost her balance, and fell to the ground.  She did report hitting her head, but there was no loss of consciousness.  She is not on any anticoagulation. Her family took her to [facility] on day of admission and she was found to have a left hip fracture. Laboratory evaluation in the ED was unremarkable. She underwent percutaneous fixation of the left hip on 7/1/19 with minimal EBL.   --Orthopedics following, appreciate assistance   --pain control with tylenol, tramadol per Ortho  --NS @ 75/hr, can discontinue when tolerating adequate po intake   --history of delirium in the hospital and is very sensitive to medications per family, recommend starting low with pain medication dosing.   --PT/SW  --due to void  --wean oxygen as able, may take a little longer with her history of MAC. encourage IS     Hypothyroidism.  Continue her prior to admission levothyroxine.      Recent urinary tract infection.  She reported that she had urinary symptoms recently.  Her primary care physician put her on Bactrim.  Apparently, she took 7/10 doses PTA. No fever or leukocytosis. Completed Bactrim course 7/1.  --monitor clinically      Gastroesophageal reflux disease.  Continue her prior to admission Prilosec.      History of polyarteritis nodosa.  She has been maintained on Imuran.  She does not have any active symptoms related to this.    --hold Imuran for now, discussed with Ortho- ok to resume on discharge      History of depression.  Continue her prior to admission Zoloft.      Mild  cognitive impairment.   History of delirium in the hospital per family. Typically oriented x4, but can be forgetful at times. Doing well thus far without significant signs of delirium.     DVT Prophylaxis: defer to surgery   Code Status: DNR    Disposition: Expected discharge in 1-2 days pending therapy recommendation, weaning of oxygen    This patient was seen and examined with Dr. Reyes who agrees with the above plan.    Ambika Burnham PA-C    Interval History   Doing well today. Reports pain controlled with oral medications. Rivas removed this am, she is due to void. Denies nausea, dizziness. Tolerating po. Denies SOB. Chronic cough is at baseline.     -Data reviewed today: I reviewed all new labs and imaging results over the last 24 hours. I personally reviewed no images or EKG's today.    Physical Exam   Temp: 98.5  F (36.9  C) Temp src: Oral BP: 124/85 Pulse: 90 Heart Rate: 77 Resp: 16 SpO2: 92 % O2 Device: Nasal cannula Oxygen Delivery: 2 LPM  Vitals:    06/30/19 1403 07/02/19 0700   Weight: 52.2 kg (115 lb) 53 kg (116 lb 13.5 oz)     Vital Signs with Ranges  Temp:  [96.8  F (36  C)-99.3  F (37.4  C)] 98.5  F (36.9  C)  Pulse:  [79-90] 90  Heart Rate:  [77-91] 77  Resp:  [14-33] 16  BP: ()/(60-93) 124/85  SpO2:  [86 %-100 %] 92 %  I/O last 3 completed shifts:  In: 700 [I.V.:700]  Out: 1400 [Urine:1400]    Constitutional: Alert and oriented to person, place, time situation. Appropriately conversant. Appears comfortable.   ENT: normal cephalic, moist mucous membranes  Respiratory: Lungs clear to auscultation bilaterally, no increased work of breathing or wheezing   Cardiovascular: Regular rate and rhythm, no murmur, no LE edema, distal pulses +2/4  GI:  active bowel sounds, abdomen soft, non-tender  Skin/Integumen: warm, dry  MSK:  Moves all four extremities.  Limited ROM LLE in light of fracture.   Neuro:  Cranial nerves 2-12 grossly intact. No focal deficits.       Medications     sodium chloride 75  mL/hr at 07/01/19 2346       acetaminophen  1,000 mg Oral Q8H     ceFAZolin  1 g Intravenous Q8H     celecoxib  200 mg Oral BID     enoxaparin  40 mg Subcutaneous Q24H     levothyroxine  88 mcg Oral QAM AC     omeprazole  20 mg Oral Daily     ondansetron  4 mg Intravenous Q6H     ranitidine  150 mg Oral At Bedtime     senna-docusate  1 tablet Oral BID    Or     senna-docusate  2 tablet Oral BID     sertraline  50 mg Oral At Bedtime     sodium chloride (PF)  3 mL Intracatheter Q8H     sodium chloride (PF)  3 mL Intracatheter Q8H       Data   Recent Labs   Lab 07/02/19  0635 07/01/19  2207 07/01/19  0649 06/30/19  1435   WBC  --   --   --  9.3   HGB 11.4*  --   --  12.7   MCV  --   --   --  101*   PLT  --  220  --  273   NA  --   --  141 140   POTASSIUM  --   --  4.0 4.0   CHLORIDE  --   --  110* 106   CO2  --   --  28 30   BUN  --   --  13 18   CR  --  0.57 0.65 0.68   ANIONGAP  --   --  3 4   MICHELLE  --   --  8.4* 9.5   GLC 86  --  88 87       Recent Results (from the past 24 hour(s))   XR Surgery PRISCILLA L/T 5 Min Fluoro w Stills    Narrative    XR SURGERY PRISCILLA FLUORO LESS THAN 5 MIN W STILLS 7/1/2019 4:37 PM     COMPARISON:    HISTORY: Left hip ORIF, 7147-1345, 2 images, 0.8 min fluoro time    NUMBER OF IMAGES ACQUIRED: 2    VIEWS: 2    FLUOROSCOPY TIME: .8      Impression    IMPRESSION: C-arm fluoroscopy was utilized during the open reduction  and internal fixation of the hip fracture.    ARIS MAR MD

## 2019-07-02 NOTE — PROGRESS NOTES
"   07/02/19 0950   Quick Adds   Type of Visit Initial PT Evaluation   Living Environment   Lives With alone   Living Arrangements assisted living   Home Accessibility   (Elevator access. )   Transportation Anticipated   (Kids provide transportation. )   Living Environment Comment Moved into an AL about a year and a half ago.    Self-Care   Usual Activity Tolerance moderate   Current Activity Tolerance fair   Regular Exercise Yes  (\"Not lately though\")   Activity/Exercise Type walking   Exercise Amount/Frequency   (Patient unclear as to frequency. )   Equipment Currently Used at Home shower chair;walker, rolling   Functional Level Prior   Ambulation 1-->assistive equipment   Transferring 0-->independent   Toileting 1-->assistive equipment  (Grab bars. )   Bathing 1-->assistive equipment  (Shower chair. )   Fall history within last six months yes   Number of times patient has fallen within last six months 1   General Information   Onset of Illness/Injury or Date of Surgery - Date 06/29/19   Patient/Family Goals Statement \"I'd like to go back to my place. I patricio it depends on how well I do.\"   Precautions/Limitations fall precautions;oxygen therapy device and L/min  (One liter with O2 sat 97%. )   Weight-Bearing Status - LLE weight-bearing as tolerated   Cognitive Status Examination   Orientation orientation to person, place and time   Level of Consciousness alert   Follows Commands and Answers Questions 100% of the time  (Forgetful though and needs cues repeated. )   Personal Safety and Judgment intact   Memory impaired  (Forgetful. )   Pain Assessment   Patient Currently in Pain Yes, see Vital Sign flowsheet  (Not with sitting still, but pain with movement. )   Posture    Posture Forward head position   Posture Comments Needed repeated cues for posture especially to look up.    Range of Motion (ROM)   ROM Comment Left hip flex limited by pain.    Strength   Strength Comments Left hip strength limited by pain. Needed " "assist to lift left LE on/off bed. Otherwise so specific deficits noted but generalized weakness/frailty due to age.    Bed Mobility   Bed Mobility Comments Sup to sit with min assist for left LE and cues for technique.    Transfer Skills   Transfer Comments Sit to stand with min assist, extra time and cues for technique   Gait   Gait Comments Gait 5 feet in room with ww with step by step cues for sequence and min assist. Needed cues frequently repeated.    Balance   Balance Comments Maintains static sitting and standing balance with support of walker. Slightly decreased balance with sit to stand transitions and with amb but this improved with practice.    Sensory Examination   Sensory Perception Comments Reports numbness in bilateral feet and right hand but she can feel light touch. Reports this is due to autoimmune disease.    General Therapy Interventions   Planned Therapy Interventions bed mobility training;gait training;strengthening;transfer training   Clinical Impression   Criteria for Skilled Therapeutic Intervention yes, treatment indicated   PT Diagnosis Impaired functional independence   Influenced by the following impairments pain, left hip weakness, generalized weakness   Functional limitations due to impairments Needs assist for bed mobility, transfers and amb.    Clinical Presentation Stable/Uncomplicated   Clinical Decision Making (Complexity) Low complexity   Therapy Frequency 2x/day   Predicted Duration of Therapy Intervention (days/wks) 3 days   Anticipated Equipment Needs at Discharge   (She has a ww already. )   Anticipated Discharge Disposition Transitional Care Facility   Risk & Benefits of therapy have been explained Yes   Patient, Family & other staff in agreement with plan of care Yes   [facility] AM-PAC TM \"6 Clicks\"   2016, Trustees of [facility], under license to [employer].  All rights reserved.   6 Clicks Short Forms Basic Mobility Inpatient Short Form   [place] " "[facility]  \"6 Clicks\" V.2 Basic Mobility Inpatient Short Form   1. Turning from your back to your side while in a flat bed without using bedrails? 3 - A Little   2. Moving from lying on your back to sitting on the side of a flat bed without using bedrails? 3 - A Little   3. Moving to and from a bed to a chair (including a wheelchair)? 3 - A Little   4. Standing up from a chair using your arms (e.g., wheelchair, or bedside chair)? 3 - A Little   5. To walk in hospital room? 3 - A Little   6. Climbing 3-5 steps with a railing? 2 - A Lot   Basic Mobility Raw Score (Score out of 24.Lower scores equate to lower levels of function) 17   Total Evaluation Time   Total Evaluation Time (Minutes) 14     "

## 2019-07-02 NOTE — PLAN OF CARE
DATE & TIME: 7/2/2019 6906-8891  Cognitive Concerns/ Orientation : A&Ox4  BEHAVIOR & AGGRESSION TOOL COLOR: green   CIWA SCORE: na    ABNL VS/O2: vss, weaned off O2.  MOBILITY: Ax1 with GB/WK, ambulate to bathroom. Rivas removed at 0630 am today, voiding well.   PAIN MANAGMENT: PRN oxycodone 2.5 mg given once.   DIET: Regular, tolerated well.   BOWEL/BLADDER: INC of bladder at times  ABNL LAB/BG: na   DRAIN/DEVICES: na   TELEMETRY RHYTHM: na   SKIN: dressing intact on left leg.   TESTS/PROCEDURES: na   D/C DAY/GOALS/PLACE: possible in am to home (prison) or TCU.   OTHER IMPORTANT INFO: na

## 2019-07-02 NOTE — PLAN OF CARE
Discharge Planner PT   Patient plan for discharge: Wants to return to AL but agreeable to TCU if needed. Educated patient and daughter that TCU is current recommendation.   Current status: Patient seen for initial eval and treatment initiated. Patient lives in an AL alone. She is typically modified independent with use of ww and family provides transportation. Patient currently needs mod assist for sup to sit for assist with left LE. Min assist and multiple cues for sit to stand and min assist with constant cues initially progressing to infrequent cues for posture, looking up and sequence. Left up in chair with chair alarm on and daughter present. Patient on one liter oxygen when therapist arrived. Removed O2 while amb and O2 sat 84% after amb. No c/o SOB and no distress. Daughter present and reports patients lungs had been damaged previously and that her O2 sat stays between 85 and 90%. Placed back on one liter and before therapist left the room, O2 sat 97%.   Barriers to return to prior living situation: Lives alone in AL, needs assist for bed mobility, transfers and amb currently, fall risk  Recommendations for discharge: TCU  Rationale for recommendations: Per patient and daughter patient will need to be modified independent with use of walker prior to discharging to AL. If improves quickly to modified independent may be able to discharge to AL but currently anticipate pateint would benefit from continued skilled PTat TCU to progress her back to baseline prior to discharging back to AL.        Entered by: Lilly Springer 07/02/2019 10:33 AM

## 2019-07-03 ENCOUNTER — APPOINTMENT (OUTPATIENT)
Dept: PHYSICAL THERAPY | Facility: CLINIC | Age: 84
DRG: 481 | End: 2019-07-03
Payer: COMMERCIAL

## 2019-07-03 LAB
GLUCOSE SERPL-MCNC: 102 MG/DL (ref 70–99)
HGB BLD-MCNC: 10.7 G/DL (ref 11.7–15.7)

## 2019-07-03 PROCEDURE — 25000128 H RX IP 250 OP 636: Performed by: ORTHOPAEDIC SURGERY

## 2019-07-03 PROCEDURE — 82947 ASSAY GLUCOSE BLOOD QUANT: CPT | Performed by: ORTHOPAEDIC SURGERY

## 2019-07-03 PROCEDURE — 40000894 ZZH STATISTIC OT IP EVAL DEFER: Performed by: OCCUPATIONAL THERAPIST

## 2019-07-03 PROCEDURE — 97530 THERAPEUTIC ACTIVITIES: CPT | Mod: GP | Performed by: PHYSICAL THERAPY ASSISTANT

## 2019-07-03 PROCEDURE — 12000000 ZZH R&B MED SURG/OB

## 2019-07-03 PROCEDURE — 25000132 ZZH RX MED GY IP 250 OP 250 PS 637: Performed by: ORTHOPAEDIC SURGERY

## 2019-07-03 PROCEDURE — 97116 GAIT TRAINING THERAPY: CPT | Mod: GP | Performed by: PHYSICAL THERAPY ASSISTANT

## 2019-07-03 PROCEDURE — 85018 HEMOGLOBIN: CPT | Performed by: ORTHOPAEDIC SURGERY

## 2019-07-03 PROCEDURE — 99232 SBSQ HOSP IP/OBS MODERATE 35: CPT | Performed by: PHYSICIAN ASSISTANT

## 2019-07-03 PROCEDURE — 36415 COLL VENOUS BLD VENIPUNCTURE: CPT | Performed by: ORTHOPAEDIC SURGERY

## 2019-07-03 PROCEDURE — 97110 THERAPEUTIC EXERCISES: CPT | Mod: GP | Performed by: PHYSICAL THERAPY ASSISTANT

## 2019-07-03 RX ORDER — AMOXICILLIN 250 MG
1 CAPSULE ORAL 2 TIMES DAILY PRN
DISCHARGE
Start: 2019-07-03 | End: 2019-08-26

## 2019-07-03 RX ADMIN — SENNOSIDES AND DOCUSATE SODIUM 1 TABLET: 8.6; 5 TABLET ORAL at 21:46

## 2019-07-03 RX ADMIN — RANITIDINE 150 MG: 150 TABLET ORAL at 21:46

## 2019-07-03 RX ADMIN — ACETAMINOPHEN 1000 MG: 500 TABLET, FILM COATED ORAL at 13:57

## 2019-07-03 RX ADMIN — OXYCODONE HYDROCHLORIDE 2.5 MG: 5 TABLET ORAL at 22:55

## 2019-07-03 RX ADMIN — SENNOSIDES AND DOCUSATE SODIUM 1 TABLET: 8.6; 5 TABLET ORAL at 08:44

## 2019-07-03 RX ADMIN — ENOXAPARIN SODIUM 40 MG: 40 INJECTION SUBCUTANEOUS at 13:57

## 2019-07-03 RX ADMIN — OMEPRAZOLE 20 MG: 20 CAPSULE, DELAYED RELEASE ORAL at 08:43

## 2019-07-03 RX ADMIN — ACETAMINOPHEN 1000 MG: 500 TABLET, FILM COATED ORAL at 21:46

## 2019-07-03 RX ADMIN — OXYCODONE HYDROCHLORIDE 2.5 MG: 5 TABLET ORAL at 09:44

## 2019-07-03 RX ADMIN — LEVOTHYROXINE SODIUM 88 MCG: 88 TABLET ORAL at 06:51

## 2019-07-03 RX ADMIN — SERTRALINE HYDROCHLORIDE 50 MG: 50 TABLET ORAL at 21:46

## 2019-07-03 RX ADMIN — CELECOXIB 200 MG: 200 CAPSULE ORAL at 08:44

## 2019-07-03 RX ADMIN — CELECOXIB 200 MG: 200 CAPSULE ORAL at 21:46

## 2019-07-03 RX ADMIN — ACETAMINOPHEN 1000 MG: 500 TABLET, FILM COATED ORAL at 06:51

## 2019-07-03 ASSESSMENT — ACTIVITIES OF DAILY LIVING (ADL)
ADLS_ACUITY_SCORE: 20
ADLS_ACUITY_SCORE: 18
ADLS_ACUITY_SCORE: 20

## 2019-07-03 ASSESSMENT — MIFFLIN-ST. JEOR: SCORE: 925.29

## 2019-07-03 NOTE — PLAN OF CARE
Discharge Planner OT   Patient plan for discharge: TCU  Current status: OT orders received, chart reviewed and discussed with patient. Pt live at [facility], but plans to go to TCU before returning to home. Will defer OT to next level of care to allow progression of mobility prior to initiation of OT. Pt is in agreement with this plan  Barriers to return to prior living situation: defer to medical team  Recommendations for discharge: TCU  Rationale for recommendations: will defer OT to next level of care as plan is to discharge tomorrow       Entered by: Jolene Frost 07/03/2019 11:59 AM

## 2019-07-03 NOTE — PLAN OF CARE
Pt a&o - forgetful at times, on RA most of the shift from 90%-94%, encourage IS , voiding in BR , pain managed with scheduled tylenol, gave oxy X 1 prior to morning therapy. Possible discharge tomorrow.

## 2019-07-03 NOTE — PROGRESS NOTES
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      Mechanical fall with left hip fracture s/p percutaneous fixation 7/1/219  Patient had mechanical fall PTA while at family reunion and was not using her walker, she lost her balance, and fell to the ground.  She did report hitting her head, but there was no loss of consciousness.  She is not on any anticoagulation. Her family took her to [facility] on day of admission and she was found to have a left hip fracture. Laboratory evaluation in the ED was unremarkable. She underwent percutaneous fixation of the left hip on 7/1/19 with minimal EBL.   --Orthopedics following, appreciate assistance   --pain control with tylenol, tramadol per Ortho  --encourage po fluids   --PT/SW  --wean oxygen as able, may take a little longer with her history of MAC. Encourage pulmonary toilet     Hypothyroidism.  Continue her prior to admission levothyroxine.      Recent urinary tract infection.  She reported that she had urinary symptoms recently.  Her primary care physician put her on Bactrim.  Apparently, she took 7/10 doses PTA. No fever or leukocytosis. Completed Bactrim course 7/1.  --monitor clinically      Gastroesophageal reflux disease.  Continue her prior to admission Prilosec.      History of polyarteritis nodosa.  She has been maintained on Imuran.  She does not have any active symptoms related to this.    --hold Imuran for now, discussed with Ortho- ok to resume on discharge      History of depression.  Continue her prior to admission Zoloft.      Mild cognitive impairment.   History of delirium in the hospital per family. Typically oriented x4, but can be forgetful at times. Doing well thus far without significant signs of delirium.      DVT Prophylaxis: Defer to Orthopedic service  Code Status: DNR    Disposition: Expected discharge to TCU 7/4 once off oxygen     This patient was seen and examined with Dr. Reyes who agrees with the above plan.    Ambika Burnham PA-C    Interval History   Doing well today. Tolerating po. Voiding. BM x 1 since admission. Denies SOB. Reports cough is at baseline. No nausea or dizziness. Pain controlled.     -Data reviewed today: I reviewed all new labs and imaging results over the last 24 hours. I personally reviewed no images or EKG's today.    Physical Exam   Temp: 97.9  F (36.6  C) Temp src: Oral BP: 104/66   Heart Rate: 79 Resp: 16 SpO2: 92 % O2 Device: Nasal cannula Oxygen Delivery: 2 LPM  Vitals:    06/30/19 1403 07/02/19 0700 07/03/19 0655   Weight: 52.2 kg (115 lb) 53 kg (116 lb 13.5 oz) 51.5 kg (113 lb 9.6 oz)     Vital Signs with Ranges  Temp:  [97.9  F (36.6  C)-98.6  F (37  C)] 97.9  F (36.6  C)  Heart Rate:  [] 79  Resp:  [14-16] 16  BP: ()/(51-66) 104/66  SpO2:  [80 %-99 %] 92 %  I/O last 3 completed shifts:  In: 2512 [P.O.:1270; I.V.:1242]  Out: -     Constitutional: Alert and oriented to person, place, time situation. Appropriately conversant. Appears comfortable.   ENT: normal cephalic, moist mucous membranes  Respiratory: Lungs clear to auscultation bilaterally, no increased work of breathing or wheezing   Cardiovascular: Regular rate and rhythm, no murmur, no LE edema, distal pulses +2/4  GI:  active bowel sounds, abdomen soft, non-tender  Skin/Integumen: warm, dry  MSK:  Moves all four extremities.  Limited ROM LLE in light of fracture.   Neuro:  Cranial nerves 2-12 grossly intact. No focal deficits.          Medications       acetaminophen  1,000 mg Oral Q8H     celecoxib  200 mg Oral BID     enoxaparin  40 mg Subcutaneous Q24H     levothyroxine  88 mcg Oral QAM AC     omeprazole  20 mg Oral Daily     ranitidine  150 mg Oral At Bedtime     senna-docusate  1 tablet Oral BID     Or     senna-docusate  2 tablet Oral BID     sertraline  50 mg Oral At Bedtime     sodium chloride (PF)  3 mL Intracatheter Q8H     sodium chloride (PF)  3 mL Intracatheter Q8H       Data   Recent Labs   Lab 07/03/19 0619 07/02/19 0635 07/01/19 2207 07/01/19 0649 06/30/19  1435   WBC  --   --   --   --  9.3   HGB 10.7* 11.4*  --   --  12.7   MCV  --   --   --   --  101*   PLT  --   --  220  --  273   NA  --   --   --  141 140   POTASSIUM  --   --   --  4.0 4.0   CHLORIDE  --   --   --  110* 106   CO2  --   --   --  28 30   BUN  --   --   --  13 18   CR  --   --  0.57 0.65 0.68   ANIONGAP  --   --   --  3 4   MICHELLE  --   --   --  8.4* 9.5   * 86  --  88 87       No results found for this or any previous visit (from the past 24 hour(s)).

## 2019-07-03 NOTE — PLAN OF CARE
POD 2 from a L hip percutaneous fixation. A&Ox4, forgetful. CMS intact with LLE 3/5 strength and baseline numbness to BLE & RUE. Bowel sounds +x4, - flatus, tolerating regular diet. VSS on 1-2 L O2 (siO2 80% on RA). Dressing CDI. Up with A1 to BR. Incontinent at times. Denies pain. Plan for discharge to TCU pending placement.

## 2019-07-03 NOTE — CONSULTS
Care Transition Initial Assessment - SW     Met with: Patient    Active Problems:    Hip fracture (H)    Displaced fracture of left femoral neck (H)       DATA  Lives With: alone   Living Arrangements: assisted living  Quality of Family Relationships: supportive, involved  Description of Support System: Supportive, Involved  Who is your support system?: Children  Identified issues/concerns regarding health management: discharge planning  Quality of Family Relationships: supportive, involved  Transportation Anticipated: (Kids provide transportation. )    ASSESSMENT  Cognitive Status:  awake and alert  Concerns to be addressed:      Per SW consult for discharge planning. Pt admitted 6/30 with a hip fracture. Pt will tentatively discharge 7/4. Reviewed medical record. Met with pt to discuss discharge plan. Pt was living at [facility] with some services. Discussed recommendation of TCU. Pt is in agreement with TCU but would like SW to contact her dtr, Darlene to discuss where to send referrals to. Pt is hoping to be as close as possible to her AL in [place]. SW called and left  for Darlene to discuss TCU choices.      PLAN  Financial costs for the patient includes: n/a   Patient given options and choices for discharge: reviewed TCU choices list   Patient/family is agreeable to the plan?  Yes  Transportation/person: TBD   Patient Goals and Preferences: TCU   Patient anticipates discharging to: TCU    Will continue to follow for discharge planning    Danielle Tapia MSW, [facility]     ADDENDUM @5691: Received call from Darleen-she is in agreement with TCU plan. Darlene would like referrals sent to [facility] and [facility]. Discussed private/shared room. Darlene would like pt to have a private room and is ok with additional cost for private room. ZOIE will send out referrals via [facility] and then follow-up with pt/Darlene.     ADDENDUM @0197: Received call from [facility]-they can accept pt for admission tomorrow. They will work on getting the prior  auth from insurance and then let SW know.     ADDENDUM @5130: [facility] called-they already got auth from insurance. They will be able to accept pt for admission tomorrow. SW will wait to update Darlene and pt on [facility] decision after Darlene arrives at the hospital.     ADDENDUM @1558: Met with pt and Darlene to provide update on [facility] acceptance and prior auth received. Pt and Darlene were in agreement to go to [facility]. ZOIE explained she would help coordinate discharge if the plan is still to discharge tomorrow. Darlene is thinking she will transport her. Darlene will wait to hear from ZOIE in the morning regarding when discharge date will be.

## 2019-07-03 NOTE — PLAN OF CARE
A&O, forgetful.  VSS, RA.  CMS intact.  Dressing CDI.  Pain managed with tylenol.  Up with 1 and walker.  Incontinent of bladder.  discharge pending.  Continue to monitor.

## 2019-07-03 NOTE — PLAN OF CARE
A&O, forgetful at times.  VSS, ex soft BP.  RA.  CMS intact, ex baseline numbness.  Pain managed with oxy and tylenol.  Up with 1 and walker.  Incontinent at times.  Possible discharge tomorrow to TCU.

## 2019-07-03 NOTE — PROGRESS NOTES
"Orthopedic Surgery  7/3/2019  POD#: 2    S: Patient voices no complaints today. Pain very minimal.  Denies calf pain, dizziness, SOB.    O: Blood pressure 104/66, pulse 90, temperature 97.9  F (36.6  C), temperature source Oral, resp. rate 14, height 1.626 m (5' 4\"), weight 51.5 kg (113 lb 9.6 oz), SpO2 92 %.  Lab Results   Component Value Date    HGB 10.7 07/03/2019     Lab Results   Component Value Date    INR 1.00 12/11/2014        I/O last 3 completed shifts:  In: 2512 [P.O.:1270; I.V.:1242]  Out: -     Neurovascularly intact.  Calves are negative bilaterally, both soft and nontender.  The wound is C/D/I.  The wound looks good with minimal erythema of the surrounding skin.    A: Ms. Mabry is doing well status post Procedure(s):  Percutaneous pinning of nondisplaced femoral neck frature    P:   1. Mobilize and continue physical therapy. WBAT.  2. Anticoagulation - ASA at discharge  3. Pain management - controlled  4. Anticipate discharge to AL vs TCU based on PT and placement.  OK from ortho.      Angelica Sewell PA-C  O: [phone]  "

## 2019-07-03 NOTE — PLAN OF CARE
Discharge Planner PT   Patient plan for discharge: TCU  Current status: Pt performed sit to/from stand transfer from w/c with min assist and cues for safety.  Gait training x 180 ft using wheeled walker and CGA.  Slow pace.  Paused intermittently to rest.  Pt c/o left groin pain during gait.  Pt's O2 sats prior to gait were 93%.  Amb on RA and following gait O2 sats were 81%.  Reapplied O2 at 1L and it took 2-3 minutes for pt to recover sats to 93%.  No SOB noted.   Barriers to return to prior living situation: Lives alone in AL, needs assist for bed mobility, transfers and amb currently, fall risk  Recommendations for discharge: TCU per plan established by the PT.   Rationale for recommendations: Per patient and daughter patient will need to be modified independent with use of walker prior to discharging to AL. If improves quickly to modified independent may be able to discharge to AL but currently anticipate pateint would benefit from continued skilled PTat TCU to progress her back to baseline prior to discharging back to AL.        Entered by: Nelda Ruelas 07/03/2019 11:10 AM

## 2019-07-04 ENCOUNTER — APPOINTMENT (OUTPATIENT)
Dept: PHYSICAL THERAPY | Facility: CLINIC | Age: 84
DRG: 481 | End: 2019-07-04
Payer: COMMERCIAL

## 2019-07-04 VITALS
TEMPERATURE: 97.7 F | DIASTOLIC BLOOD PRESSURE: 71 MMHG | RESPIRATION RATE: 16 BRPM | SYSTOLIC BLOOD PRESSURE: 125 MMHG | HEIGHT: 64 IN | WEIGHT: 118.8 LBS | HEART RATE: 81 BPM | BODY MASS INDEX: 20.28 KG/M2 | OXYGEN SATURATION: 91 %

## 2019-07-04 LAB
CREAT SERPL-MCNC: 0.59 MG/DL (ref 0.52–1.04)
GFR SERPL CREATININE-BSD FRML MDRD: 81 ML/MIN/{1.73_M2}
PLATELET # BLD AUTO: 233 10E9/L (ref 150–450)

## 2019-07-04 PROCEDURE — 25000132 ZZH RX MED GY IP 250 OP 250 PS 637: Performed by: ORTHOPAEDIC SURGERY

## 2019-07-04 PROCEDURE — 82565 ASSAY OF CREATININE: CPT | Performed by: ORTHOPAEDIC SURGERY

## 2019-07-04 PROCEDURE — 99238 HOSP IP/OBS DSCHRG MGMT 30/<: CPT | Performed by: INTERNAL MEDICINE

## 2019-07-04 PROCEDURE — 85049 AUTOMATED PLATELET COUNT: CPT | Performed by: ORTHOPAEDIC SURGERY

## 2019-07-04 PROCEDURE — 97116 GAIT TRAINING THERAPY: CPT | Mod: GP | Performed by: PHYSICAL THERAPIST

## 2019-07-04 PROCEDURE — 36415 COLL VENOUS BLD VENIPUNCTURE: CPT | Performed by: ORTHOPAEDIC SURGERY

## 2019-07-04 PROCEDURE — 25000128 H RX IP 250 OP 636: Performed by: ORTHOPAEDIC SURGERY

## 2019-07-04 RX ADMIN — SENNOSIDES AND DOCUSATE SODIUM 1 TABLET: 8.6; 5 TABLET ORAL at 10:14

## 2019-07-04 RX ADMIN — ACETAMINOPHEN 650 MG: 325 TABLET, FILM COATED ORAL at 01:57

## 2019-07-04 RX ADMIN — OMEPRAZOLE 20 MG: 20 CAPSULE, DELAYED RELEASE ORAL at 10:14

## 2019-07-04 RX ADMIN — OXYCODONE HYDROCHLORIDE 2.5 MG: 5 TABLET ORAL at 06:45

## 2019-07-04 RX ADMIN — CELECOXIB 200 MG: 200 CAPSULE ORAL at 10:14

## 2019-07-04 RX ADMIN — ACETAMINOPHEN 1000 MG: 500 TABLET, FILM COATED ORAL at 14:04

## 2019-07-04 RX ADMIN — LEVOTHYROXINE SODIUM 88 MCG: 88 TABLET ORAL at 06:45

## 2019-07-04 RX ADMIN — ENOXAPARIN SODIUM 40 MG: 40 INJECTION SUBCUTANEOUS at 14:04

## 2019-07-04 ASSESSMENT — ACTIVITIES OF DAILY LIVING (ADL)
ADLS_ACUITY_SCORE: 20

## 2019-07-04 ASSESSMENT — MIFFLIN-ST. JEOR: SCORE: 948.87

## 2019-07-04 NOTE — PROGRESS NOTES
ZOIE      D: Received discharge orders. Patient accepted [facility] and bed available for today. ZOIE updated patient. Per patient request, ZOIE spoke with robbie, daughter who reports that she will transport patient at 14. ZOIE updated [facility] on transport time and bedside nurse. Faxed orders, PAS to [facility] charge nurse at [phone].    P: No other SW needs at this time       PAS-RR    D: Per DHS regulation, ZOIE completed and submitted PAS-RR to [place] [employer] Direct Connect via the Senior LinkAge Line.  PAS-RR confirmation # is : [id number]        P: Further questions may be directed to [facility] LinkAge Line at #[phone], option #4 for PAS-RR staff.

## 2019-07-04 NOTE — DISCHARGE SUMMARY
"[facility]  Discharge Summary        Rayne Mabry MRN# [id number]   YOB: 1930 Age: 89 year old     Date of Admission: 6/30/2019  Date of Discharge: 7/4/2019  Admitting Physician: Laura Decker MD  Discharge Physician: Ashish Reyes MD     Primary Provider: Susan Hanley  Primary Care Physician Phone Number: [phone]         Discharge Diagnoses:   Mechanical fall with left hip fracture - s/p percutaneous fixation 7/1/2019.        Other Chronic Medical Problems:      1. Polyarteritis nodosa with polyneuropathy.  2. Hypothyroidism.    3. Gastroesophageal reflux disease.    4. Depression/anxiety.  5. Mild cognitive impairment.          Allergies:         Allergies   Allergen Reactions     Zolpidem Other (See Comments)     Confusion even with low dose.           Discharge Medications:        Current Discharge Medication List      START taking these medications    Details   aspirin (ASA) 325 MG EC tablet Take one Aspirin tab twice daily for 5 weeks.  Qty: 70 tablet, Refills: 0    Associated Diagnoses: Closed fracture of left hip, initial encounter (H)      ondansetron (ZOFRAN-ODT) 4 MG ODT tab Take 1 tablet (4 mg) by mouth every 6 hours as needed for nausea or vomiting  Qty: 8 tablet, Refills: 0    Associated Diagnoses: Displaced fracture of left femoral neck (H)      oxyCODONE (ROXICODONE) 5 MG tablet Take 0.5-1 tablets (2.5-5 mg) by mouth every 4 hours as needed 1/2 tab po q 4 hrs prn pain scale \"1-5\"  1 tab po q 4 hrs prn pain scale \"6-10\"  Qty: 20 tablet, Refills: 0    Associated Diagnoses: Displaced fracture of left femoral neck (H)      ranitidine (ZANTAC) 150 MG tablet Take 1 tablet (150 mg) by mouth 2 times daily    Associated Diagnoses: Gastroesophageal reflux disease without esophagitis         CONTINUE these medications which have CHANGED    Details   acetaminophen (TYLENOL) 325 MG tablet Take 2 tablets (650 mg) by mouth every 4 hours as needed for mild " pain  Qty: 30 tablet, Refills: 0    Associated Diagnoses: Displaced fracture of left femoral neck (H)      senna-docusate (SENOKOT-S/PERICOLACE) 8.6-50 MG tablet Take 1 tablet by mouth 2 times daily as needed    Associated Diagnoses: Drug-induced constipation         CONTINUE these medications which have NOT CHANGED    Details   albuterol (PROAIR HFA/PROVENTIL HFA/VENTOLIN HFA) 108 (90 BASE) MCG/ACT Inhaler Inhale 1-2 puffs into the lungs every 4 hours as needed      azaTHIOprine (IMURAN) 50 MG tablet Take 150 mg by mouth daily      bismuth subsalicylate (PEPTO-BISMOL) 262 MG chewable tablet Take 2 tablets by mouth every hour as needed (max 8 tablets/24 hours)      calcium carbonate 500 mg, elemental, (OSCAL;OYSTER SHELL CALCIUM) 500 MG tablet Take 500 mg by mouth 2 times daily      ergocalciferol (ERGOCALCIFEROL) 31697 units capsule Take 50,000 Units by mouth once a week Every Tuesday      levothyroxine (SYNTHROID/LEVOTHROID) 88 MCG tablet Take 88 mcg by mouth daily       loratadine (CLARITIN) 10 MG tablet Take 10 mg by mouth daily      melatonin 3 MG tablet Take 3 mg by mouth At Bedtime       omeprazole (PRILOSEC) 20 MG CR capsule Take 20 mg by mouth daily Do not crush      sertraline (ZOLOFT) 50 MG tablet Take 50 mg by mouth At Bedtime          STOP taking these medications       sulfamethoxazole-trimethoprim (BACTRIM DS/SEPTRA DS) 800-160 MG tablet Comments:   Reason for Stopping:         traMADol (ULTRAM) 50 MG tablet Comments:   Reason for Stopping:                   Discharge Instructions and Follow-Up:      Discharge Orders      Wound care    Site:   L hip  Instructions:  Leave aquacel dressing in place until post-op follow-up.  If it becomes loose or soiled then remove and replace with daily dry dressings.     Activity - Up with assistive device     Weight bearing status    WBAT     Additional Discharge Instructions    Encourage incentive spirometry!    During admission patient used Oxycodone 2.5mg for  pain; this should be tried first as she has tolerated it well.     Pulmonary toilet     Reason for your hospital stay    You were here for evaluation of a hip fracture     Follow Up and recommended labs and tests    Follow up with TCU provider. Follow up with PCP after TCU discharge.  Follow up with Angelica Sewell PA-C at [facility] for staple removal and xrays.  An appointment has been made for you at our [place] office on 7/16 at 9:30am. If you have questions, please call our [place] office: [phone].     Physical Therapy Adult Consult    Evaluate and treat as clinically indicated.    Reason:  S/p percutaneous pinning of nondisplaced femoral neck fracture. WBAT.     Occupational Therapy Adult Consult    Evaluate and treat as clinically indicated.    Reason:  S/p percutaneous pinning of nondisplaced femoral neck fracture. WBAT.     Fall precautions     Advance Diet as Tolerated    Follow this diet upon discharge: Orders Placed This Encounter      Advance Diet as Tolerated: Regular Diet Adult           Consultations This Hospital Stay:      Orthopedic Surgery.        Admission History:      Please see the H&P by Laura Decker MD on 6/30/2019 for complete details. Briefly, Rayne Mabry is a 89 year old female with a past medical history significant for polyarteritis nodosa with polyneuropathy on Imuran, hypothyroidism, GERD and h/o MAC infection, who was admitted 6/30/2019 with left hip fracture.         Problem Oriented Hospital Course:      Mechanical fall with left hip fracture - s/p percutaneous fixation 7/1/2019.  - Continue acetaminophen 1000 mg q8h.  - Continue PRN acetaminophen, PRN oxycodone.  - Continue therapies.  - Follow-up with Ortho.    Hypoxia, suspect atelectasis related to recent surgery.  Off O2 am 7/4.  - Continue IS.    Recent urinary tract infection.  She reported that she had urinary symptoms recently. Her primary care physician put her on Bactrim. Apparently, she  took 7/10 doses PTA. Continued on Bactrim on admit and completed course on 7/1.  - Monitor clinically.     Polyarteritis nodosa with polyneuropathy.  She has been maintained on Imuran. She does not have any active symptoms related to this. Imuran held on admit.  - Resume Imuran at discharge; discussed with Ortho previously.     Hypothyroidism.    - Continue her prior to admission levothyroxine.       Gastroesophageal reflux disease.    - Continue her prior to admission Prilosec.      Depression/anxiety.  - Continue her prior to admission Zoloft.      Mild cognitive impairment.     History of delirium in the hospital per family. Typically oriented x4, but can be forgetful at times. Doing well thus far without significant signs of delirium.  - Monitor clinically.         Pending Results:        Unresulted Labs Ordered in the Past 30 Days of this Admission     No orders found from 5/1/2019 to 7/1/2019.                Discharge Disposition:      Discharged to TCU.        Discharge Time:      Less than 30 minutes.        Key Imaging Studies, Lab Findings and Procedures/Surgeries:        Results for orders placed or performed during the hospital encounter of 06/30/19   XR Chest 1 View    Narrative    CHEST ONE VIEW 6/30/2019 3:02 PM     COMPARISON: Two-view chest x-ray 5/23/2011.    HISTORY: Pre-op      Impression    IMPRESSION: There are emphysematous and fibrotic changes in both  lungs. There are no airspace opacities to suggest pneumonia. There is  no pleural effusion or pneumothorax. Heart size is normal.    CLAIR NICHOLS MD   XR Surgery PRISCILLA L/T 5 Min Fluoro w Stills    Narrative    XR SURGERY PRISCILLA FLUORO LESS THAN 5 MIN W STILLS 7/1/2019 4:37 PM     COMPARISON:    HISTORY: Left hip ORIF, [id number], 2 images, 0.8 min fluoro time    NUMBER OF IMAGES ACQUIRED: 2    VIEWS: 2    FLUOROSCOPY TIME: .8      Impression    IMPRESSION: C-arm fluoroscopy was utilized during the open reduction  and internal fixation of the hip  fracture.    ARIS MAR MD     Surgery 7/1/2019:  PROCEDURE:  Percutaneous screw fixation of left valgus impacted femoral neck fracture.

## 2019-07-04 NOTE — PLAN OF CARE
Discharge Planner PT   Patient plan for discharge: TCU.  Current status: Pt using restroom with CNA upon arrival of therapist, agreeable to session. Patient performed sit <> stand and ambulation of 250 feet with FWW and CGA, pt reported groin discomfort with ambulation. Pt agreeable to sit up in chair at end of session.   Barriers to return to prior living situation: Lives alone, Level of assist, Decreased activity tolerance, Fall risk  Recommendations for discharge: TCU  Rationale for recommendations: Pt will benefit from continued skilled PT intervention at TCU in order to improve activity tolerance and independence with functional mobility prior to returning to [facility].        Entered by: Eugenie Lynn 07/04/2019 8:48 AM     Physical Therapy Discharge Summary    Reason for therapy discharge:    Discharged to transitional care facility.    Progress towards therapy goal(s). See goals on Care Plan in [facility] electronic health record for goal details.  Goals not met.  Barriers to achieving goals:   discharge from facility.    Therapy recommendation(s):    Continued therapy is recommended.  Rationale/Recommendations:  To improve independence and safety with functional mobility .

## 2019-07-04 NOTE — PROGRESS NOTES
Ortho  S/p Perc pin left hip, POD#3    Pain controlled  Denies CP, SOB, nausea, fevers  NAD, A&O  Dry dressing  Calves soft, NT  CMS intact    Plan:  PT - WBAT  ASA  Discharge to TCU when available  Reviewed discharge instructions    Reshma Rhoades  8:17 AM

## 2019-07-04 NOTE — PLAN OF CARE
Pt. A/Ox4. VSS on RA. CMS intact ex numbness in bilateral toes/fingers baseline. Dressing CDI. Pain managed w/ Oxycodone and Tylenol. Ax1 GBWW. Regular diet. Plan for discharge to TCU.

## 2019-07-04 NOTE — PROGRESS NOTES
[facility]    Internal Medicine Hospitalist Progress Note  07/04/2019  I evaluated patient on the above date.    Ashish Reyes Jr., MD  [phone] (p)  Text Page        Assessment & Plan New actions/orders today (07/04/2019) are underlined.    Rayne Mabry is a 89 year old female with a past medical history significant for polyarteritis nodosa with polyneuropathy on Imuran, hypothyroidism, GERD and h/o MAC infection, who was admitted 6/30/2019 with left hip fracture.     Mechanical fall with left hip fracture - s/p percutaneous fixation 7/1/2019.  - Post-op management per Ortho.  - Continue acetaminophen 1000 mg q8h.  - Continue PRN acetaminophen, PRN oxycodone, PRN IV Dilaudid.  - Judicious use of opioids.  - Continue therapies.    Hypoxia, suspect atelectasis related to recent surgery.  Off O2 am 7/4.  - Continue IS.  - Continue PRN O2.    Recent urinary tract infection.  She reported that she had urinary symptoms recently. Her primary care physician put her on Bactrim. Apparently, she took 7/10 doses PTA. Continued on Bactrim on admit and completed course on 7/1.  - Monitor clinically.     Polyarteritis nodosa with polyneuropathy.  She has been maintained on Imuran. She does not have any active symptoms related to this.  Imuran held on admit.  - Resume Imuran at discharge; discussed with Ortho previously.     Hypothyroidism.    - Continue her prior to admission levothyroxine.       Gastroesophageal reflux disease.    - Continue her prior to admission Prilosec.      Depression/anxiety.  - Continue her prior to admission Zoloft.      Mild cognitive impairment.     History of delirium in the hospital per family. Typically oriented x4, but can be forgetful at times. Doing well thus far without significant signs of delirium.  - Monitor clinically.     Diet: Advance Diet as Tolerated: Regular Diet Adult  Advance Diet as Tolerated    Prophylaxis: PCD's, ambulation, enoxaparin.  Rivas Catheter: not  "present  Code Status: DNR      Disposition Plan   Expected discharge: today or tomorrow, recommended to transitional care unit once bed available.  Entered: Ashish Reyes MD 07/04/2019, 10:26 AM         Interval History   Doing OK overall.  Tolerating diet.  Denies chest pain or dyspnea.    -Data reviewed today: I reviewed all new labs and imaging over the last 24 hours. I personally reviewed no images or EKG's today.    Physical Exam   Heart Rate: 83, Blood pressure 125/71, pulse 81, temperature 97.7  F (36.5  C), temperature source Oral, resp. rate 16, height 1.626 m (5' 4\"), weight 53.9 kg (118 lb 12.8 oz), SpO2 91 %.  Vitals:    07/02/19 0700 07/03/19 0655 07/04/19 0559   Weight: 53 kg (116 lb 13.5 oz) 51.5 kg (113 lb 9.6 oz) 53.9 kg (118 lb 12.8 oz)     Vital Signs with Ranges  Temp:  [97.7  F (36.5  C)-98.2  F (36.8  C)] 97.7  F (36.5  C)  Pulse:  [81-82] 81  Heart Rate:  [75-87] 83  Resp:  [16] 16  BP: (100-125)/(55-71) 125/71  SpO2:  [82 %-97 %] 91 %  Patient Vitals for the past 24 hrs:   BP Temp Temp src Pulse Heart Rate Resp SpO2 Weight   07/04/19 0828 -- -- -- -- -- -- 91 % --   07/04/19 0819 125/71 97.7  F (36.5  C) Oral 81 83 16 (!) 82 % --   07/04/19 0645 -- -- -- -- -- 16 -- --   07/04/19 0559 -- -- -- -- -- -- -- 53.9 kg (118 lb 12.8 oz)   07/04/19 0021 109/62 97.9  F (36.6  C) Oral 81 79 16 91 % --   07/03/19 2255 -- -- -- -- -- 16 -- --   07/03/19 2018 110/66 98.2  F (36.8  C) Oral 82 75 16 97 % --   07/03/19 1700 -- -- -- -- -- 16 -- --   07/03/19 1605 100/58 97.8  F (36.6  C) Oral -- 87 16 97 % --   07/03/19 1448 103/55 97.9  F (36.6  C) Oral -- 86 16 96 % --   07/03/19 1408 -- -- -- -- -- -- 94 % --   07/03/19 1040 -- -- -- -- -- 16 90 % --     I/O's Last 24 hours  I/O last 3 completed shifts:  In: 480 [P.O.:480]  Out: -     Constitutional: Alert, oriented, pleasant.  Respiratory: Diminished in bases. No crackles or wheezes.  Cardiovascular: RRR no m/r/g.  GI: Soft, nt, nd, " +BS.  Skin/Integumen:   Other:        Data   Recent Labs   Lab 07/04/19  0541 07/03/19  0619 07/02/19  0635 07/01/19  2207 07/01/19  0649 06/30/19  1435   WBC  --   --   --   --   --  9.3   HGB  --  10.7* 11.4*  --   --  12.7   MCV  --   --   --   --   --  101*     --   --  220  --  273   NA  --   --   --   --  141 140   POTASSIUM  --   --   --   --  4.0 4.0   CHLORIDE  --   --   --   --  110* 106   CO2  --   --   --   --  28 30   BUN  --   --   --   --  13 18   CR 0.59  --   --  0.57 0.65 0.68   ANIONGAP  --   --   --   --  3 4   MICHELLE  --   --   --   --  8.4* 9.5   GLC  --  102* 86  --  88 87     Recent Labs   Lab Test 07/03/19  0619 07/02/19  0635 07/01/19  0649 06/30/19  1435 09/18/17 05/28/11  0741 05/27/11  1752 05/27/11  0805 05/26/11  1717 05/26/11  0759   * 86 88 87 101*   < >  --   --   --   --   --    BGM  --   --   --   --   --   --  95 104* 95 129* 114*    < > = values in this interval not displayed.         No results found for this or any previous visit (from the past 24 hour(s)).    Medications   All medications were reviewed.      acetaminophen  1,000 mg Oral Q8H     celecoxib  200 mg Oral BID     enoxaparin  40 mg Subcutaneous Q24H     levothyroxine  88 mcg Oral QAM AC     omeprazole  20 mg Oral Daily     ranitidine  150 mg Oral At Bedtime     senna-docusate  1 tablet Oral BID    Or     senna-docusate  2 tablet Oral BID     sertraline  50 mg Oral At Bedtime     sodium chloride (PF)  3 mL Intracatheter Q8H

## 2019-07-04 NOTE — PLAN OF CARE
Pt doing well today, up with SBA of 1 and walker.  Pt has aquacell dressing on CD&I.  Pt's CMS intact, taking oxycodone for good pain relief.  Pt has met her goals, pt's daughter here to  pt and pt is going to TCU.

## 2019-07-05 ENCOUNTER — NURSING HOME VISIT (OUTPATIENT)
Dept: GERIATRICS | Facility: CLINIC | Age: 84
End: 2019-07-05
Payer: COMMERCIAL

## 2019-07-05 VITALS
HEART RATE: 78 BPM | WEIGHT: 113.1 LBS | DIASTOLIC BLOOD PRESSURE: 60 MMHG | HEIGHT: 64 IN | SYSTOLIC BLOOD PRESSURE: 100 MMHG | OXYGEN SATURATION: 93 % | BODY MASS INDEX: 19.31 KG/M2 | RESPIRATION RATE: 18 BRPM | TEMPERATURE: 97 F

## 2019-07-05 DIAGNOSIS — K21.9 GASTROESOPHAGEAL REFLUX DISEASE WITHOUT ESOPHAGITIS: ICD-10-CM

## 2019-07-05 DIAGNOSIS — S72.002A DISPLACED FRACTURE OF LEFT FEMORAL NECK (H): Primary | ICD-10-CM

## 2019-07-05 DIAGNOSIS — R53.81 PHYSICAL DECONDITIONING: ICD-10-CM

## 2019-07-05 DIAGNOSIS — M30.0 PAN (POLYARTERITIS NODOSA) (H): ICD-10-CM

## 2019-07-05 DIAGNOSIS — I50.22 CHRONIC SYSTOLIC CONGESTIVE HEART FAILURE (H): ICD-10-CM

## 2019-07-05 DIAGNOSIS — Z98.890 S/P ORIF (OPEN REDUCTION INTERNAL FIXATION) FRACTURE: ICD-10-CM

## 2019-07-05 DIAGNOSIS — Z87.81 S/P ORIF (OPEN REDUCTION INTERNAL FIXATION) FRACTURE: ICD-10-CM

## 2019-07-05 PROCEDURE — 99310 SBSQ NF CARE HIGH MDM 45: CPT | Performed by: NURSE PRACTITIONER

## 2019-07-05 ASSESSMENT — MIFFLIN-ST. JEOR: SCORE: 923.02

## 2019-07-05 NOTE — LETTER
7/5/2019        RE: Rayne Mabry  [id number]  [place] [address]        [facility]  PRIMARY CARE PROVIDER AND CLINIC:  Susan Hanley, [facility] [address] [facility] [address]  Chief Complaint   Patient presents with     Hospital F/U     [facility] Record Number:  [id number]  Place of Service where encounter took place:  [facility] ([facility] [[id number]]    Rayne Mabry  is a 89 year old  (4/26/1930), admitted to the above facility from  [facility]. Hospital stay 6/30/19 through 7/4/19..  Admitted to this facility for  rehab, medical management and nursing care.    HPI:    HPI information obtained from: facility chart records, facility staff, patient report and [facility] chart review.   Brief Summary of Hospital Course: Mrs. Mabry was admitted to [facility] after a mechanical fall and sustained a left hip fracture and underwent Percutaneous fixation synthesis femoral neck system.   Apparently she had fallen at her prison while going to the bathroom.  She was helped up and had some pain.  Her family took her to [facility] and Xray revealed Left hip fracture.  She had a slight HGB loss from 12.7 to 10.7 otherwise her hospital course was uneventful.    Updates on Status Since Skilled nursing Admission: I am seeing Aliya in her room sitting up in her wheelchair.  She is tired from just completed therapy.  She has ice on her left hip.  Her baseline functional level is to ambulate independently with a walker with plans to return to her AL after rehab.  She denies constipation, chest pain, nausea, dizziness.  Her hip pain at rest is mild but when she gets up to stand she says it hurts pretty bad.  She thinks the pain medication helps.  States appetite is getting better. Denies dysuria, was treated for UTI during hospitalization.  Her medical history is notable for- Chronic systolic congestive heart failure (H); PAN (polyarteritis  nodosa) (H)  Treated with Azathioprine; Pelvic fracture; Gastroesophageal reflux disease without esophagitis, Hypothyroid, Depression, Dementia.      CODE STATUS/ADVANCE DIRECTIVES DISCUSSION:   DNR only  Patient's living condition: lives alone  ALLERGIES: Zolpidem  PAST MEDICAL HISTORY:  has a past medical history of Acute CHF (H), Anxiety, Arthritis, Chronic anemia, Closed left hip fracture (H), Depression, Fall (07/01/2019), GERD (gastroesophageal reflux disease), Hypoxia, MAC (mycobacterium avium-intracellulare complex), Mild cognitive disorder, Polyarteritis nodosa (H), Polyneuropathy, Thyroid disease, and UTI (urinary tract infection) (07/04/2019).  PAST SURGICAL HISTORY:   has a past surgical history that includes GYN surgery; Closed reduction, percutaneous pinning hip (Left, 7/1/2019); Hysterectomy; hip surgery (Right); LEFT HIP FX; and s/p percutaneous fixation  (07/01/2019).  FAMILY HISTORY: family history is not on file.  SOCIAL HISTORY:   reports that she has never smoked. She has never used smokeless tobacco. She reports that she drinks alcohol.    Post Discharge Medication Reconciliation Status: discharge medications reconciled, continue medications without change    Current Outpatient Medications   Medication Sig Dispense Refill     acetaminophen (TYLENOL) 325 MG tablet Take 2 tablets (650 mg) by mouth every 4 hours as needed for mild pain 30 tablet 0     albuterol (PROAIR HFA/PROVENTIL HFA/VENTOLIN HFA) 108 (90 BASE) MCG/ACT Inhaler Inhale 1-2 puffs into the lungs every 4 hours as needed       aspirin (ASA) 325 MG EC tablet Take one Aspirin tab twice daily for 5 weeks. 70 tablet 0     azaTHIOprine (IMURAN) 50 MG tablet Take 150 mg by mouth daily       bismuth subsalicylate (PEPTO-BISMOL) 262 MG chewable tablet Take 2 tablets by mouth every hour as needed (max 8 tablets/24 hours)       calcium carbonate 500 mg, elemental, (OSCAL;OYSTER SHELL CALCIUM) 500 MG tablet Take 500 mg by mouth 2 times daily    "    ergocalciferol (ERGOCALCIFEROL) 99429 units capsule Take 50,000 Units by mouth once a week Every Tuesday       levothyroxine (SYNTHROID/LEVOTHROID) 88 MCG tablet Take 88 mcg by mouth daily        loratadine (CLARITIN) 10 MG tablet Take 10 mg by mouth daily       melatonin 3 MG tablet Take 3 mg by mouth At Bedtime        omeprazole (PRILOSEC) 20 MG CR capsule Take 20 mg by mouth daily Do not crush       ondansetron (ZOFRAN-ODT) 4 MG ODT tab Take 1 tablet (4 mg) by mouth every 6 hours as needed for nausea or vomiting 8 tablet 0     oxyCODONE (ROXICODONE) 5 MG tablet Take 0.5-1 tablets (2.5-5 mg) by mouth every 4 hours as needed 1/2 tab po q 4 hrs prn pain scale \"1-5\"  1 tab po q 4 hrs prn pain scale \"6-10\" 20 tablet 0     ranitidine (ZANTAC) 150 MG tablet Take 1 tablet (150 mg) by mouth 2 times daily       senna-docusate (SENOKOT-S/PERICOLACE) 8.6-50 MG tablet Take 1 tablet by mouth 2 times daily as needed       sertraline (ZOLOFT) 50 MG tablet Take 50 mg by mouth At Bedtime          ROS:  10 point ROS of systems including Constitutional, Eyes, Respiratory, Cardiovascular, Gastroenterology, Genitourinary, Integumentary, Musculoskeletal, Psychiatric were all negative except for pertinent positives noted in my HPI.    Vitals:  /60   Pulse 78   Temp 97  F (36.1  C)   Resp 18   Ht 1.626 m (5' 4\")   Wt 51.3 kg (113 lb 1.6 oz)   SpO2 93%   BMI 19.41 kg/m     Exam:  GENERAL APPEARANCE:  Alert, in mild distress due to fatigue  ENT:  Mouth and posterior oropharynx normal, moist mucous membranes, normal hearing acuity  EYES:  EOM, conjunctivae, lids, pupils and irises normal  NECK:  No adenopathy,masses or thyromegaly  RESP:  respiratory effort and palpation of chest normal, lungs clear to auscultation , rhonchi chronic fibrotic changes  CV:  Palpation and auscultation of heart done , regular rate and rhythm, no murmur, rub, or gallop, no edema  ABDOMEN:  normal bowel sounds, soft, nontender, no " hepatosplenomegaly or other masses  M/S:   Gait and station abnormal wheelchair, assist with mobility  Digits and nails normal  SKIN:  Inspection of skin and subcutaneous tissue baseline, Palpation of skin and subcutaneous tissue baseline, unable to visualize incision  NEURO:   Examination of sensation by touch normal  PSYCH:  affect and mood normal, mild cognitive loss    Lab/Diagnostic data:  Recent labs in [facility] reviewed by me today.     ASSESSMENT/PLAN:  (S72.002A) Displaced fracture of left femoral neck (H)  (primary encounter diagnosis)  (Z96.7,  Z87.81) S/P ORIF (open reduction internal fixation) fracture  Comment: not at baseline, unable to walk or transfer independently  Plan: PT/OT  Daily hip dressing changes  Pain management with Oxycodone and APAP and ice and assist with repodisioning  Mobilize   mg bid for 1 month DVT/PE prophylaxis - monitor for GI distress, bleeding, bruising  Follow CBC - mild blood loss anemia  Bowel protocol while on narcotic pain medication      (I50.22) Chronic systolic congestive heart failure (H)  Comment: compensated  Plan: monitor for changes in weight, dyspnea  VS per facility protocol      (M30.0) PAN (polyarteritis nodosa) (H)  Comment: chronic  Plan: continue Azathioprine    (R53.81) Physical deconditioning  Comment: not at goal  Plan: PT/OT, fall precautions.   Care conference with patient and family for the progress of rehab and disposition issues will be discussed as planned.   Rehab evaluation and other evaluations including CPT are at rehab logs, to be reviewed separately.           Discharge goal is return to [facility] and ambulate independently with walker    (K21.9) Gastroesophageal reflux disease without esophagitis  Comment: high risk for changes due to being on ASA  Plan: Omeprazole daily  Ranitidine daily  Follow HGB BMP for early signs bleeding    Total time spent with patient visit at the skilled nursing facility was 35 min including patient visit and review  of past records. Greater than 50% of total time spent with counseling and coordinating care due to extensive chart review, discussion with nurse on current status, discussion with PT on initial assessment and goals.   Electronically signed by:  HADLEY Torres CNP                       Sincerely,        HADLEY Torres CNP

## 2019-07-05 NOTE — PROGRESS NOTES
[facility]  PRIMARY CARE PROVIDER AND CLINIC:  Susan Hanley, [facility] [address] [facility] / [facility] [address]  Chief Complaint   Patient presents with     Hospital F/U     [place] Medical Record Number:  [id number]  Place of Service where encounter took place:  [facility] (S) [[id number]]    Rayne Mabry  is a 89 year old  (4/26/1930), admitted to the above facility from  [facility]. Hospital stay 6/30/19 through 7/4/19..  Admitted to this facility for  rehab, medical management and nursing care.    HPI:    HPI information obtained from: facility chart records, facility staff, patient report and [facility] chart review.   Brief Summary of Hospital Course: Mrs. Mabry was admitted to [facility] after a mechanical fall and sustained a left hip fracture and underwent Percutaneous fixation synthesis femoral neck system.   Apparently she had fallen at her COOPER while going to the bathroom.  She was helped up and had some pain.  Her family took her to O and Xray revealed Left hip fracture.  She had a slight HGB loss from 12.7 to 10.7 otherwise her hospital course was uneventful.    Updates on Status Since Skilled nursing Admission: I am seeing Aliya in her room sitting up in her wheelchair.  She is tired from just completed therapy.  She has ice on her left hip.  Her baseline functional level is to ambulate independently with a walker with plans to return to her AL after rehab.  She denies constipation, chest pain, nausea, dizziness.  Her hip pain at rest is mild but when she gets up to stand she says it hurts pretty bad.  She thinks the pain medication helps.  States appetite is getting better. Denies dysuria, was treated for UTI during hospitalization.  Her medical history is notable for- Chronic systolic congestive heart failure (H); PAN (polyarteritis nodosa) (H)  Treated with Azathioprine; Pelvic fracture; Gastroesophageal reflux disease without  esophagitis, Hypothyroid, Depression, Dementia.      CODE STATUS/ADVANCE DIRECTIVES DISCUSSION:   DNR only  Patient's living condition: lives alone  ALLERGIES: Zolpidem  PAST MEDICAL HISTORY:  has a past medical history of Acute CHF (H), Anxiety, Arthritis, Chronic anemia, Closed left hip fracture (H), Depression, Fall (07/01/2019), GERD (gastroesophageal reflux disease), Hypoxia, MAC (mycobacterium avium-intracellulare complex), Mild cognitive disorder, Polyarteritis nodosa (H), Polyneuropathy, Thyroid disease, and UTI (urinary tract infection) (07/04/2019).  PAST SURGICAL HISTORY:   has a past surgical history that includes GYN surgery; Closed reduction, percutaneous pinning hip (Left, 7/1/2019); Hysterectomy; hip surgery (Right); LEFT HIP FX; and s/p percutaneous fixation  (07/01/2019).  FAMILY HISTORY: family history is not on file.  SOCIAL HISTORY:   reports that she has never smoked. She has never used smokeless tobacco. She reports that she drinks alcohol.    Post Discharge Medication Reconciliation Status: discharge medications reconciled, continue medications without change    Current Outpatient Medications   Medication Sig Dispense Refill     acetaminophen (TYLENOL) 325 MG tablet Take 2 tablets (650 mg) by mouth every 4 hours as needed for mild pain 30 tablet 0     albuterol (PROAIR HFA/PROVENTIL HFA/VENTOLIN HFA) 108 (90 BASE) MCG/ACT Inhaler Inhale 1-2 puffs into the lungs every 4 hours as needed       aspirin (ASA) 325 MG EC tablet Take one Aspirin tab twice daily for 5 weeks. 70 tablet 0     azaTHIOprine (IMURAN) 50 MG tablet Take 150 mg by mouth daily       bismuth subsalicylate (PEPTO-BISMOL) 262 MG chewable tablet Take 2 tablets by mouth every hour as needed (max 8 tablets/24 hours)       calcium carbonate 500 mg, elemental, (OSCAL;OYSTER SHELL CALCIUM) 500 MG tablet Take 500 mg by mouth 2 times daily       ergocalciferol (ERGOCALCIFEROL) 89445 units capsule Take 50,000 Units by mouth once a week  "Every Tuesday       levothyroxine (SYNTHROID/LEVOTHROID) 88 MCG tablet Take 88 mcg by mouth daily        loratadine (CLARITIN) 10 MG tablet Take 10 mg by mouth daily       melatonin 3 MG tablet Take 3 mg by mouth At Bedtime        omeprazole (PRILOSEC) 20 MG CR capsule Take 20 mg by mouth daily Do not crush       ondansetron (ZOFRAN-ODT) 4 MG ODT tab Take 1 tablet (4 mg) by mouth every 6 hours as needed for nausea or vomiting 8 tablet 0     oxyCODONE (ROXICODONE) 5 MG tablet Take 0.5-1 tablets (2.5-5 mg) by mouth every 4 hours as needed 1/2 tab po q 4 hrs prn pain scale \"1-5\"  1 tab po q 4 hrs prn pain scale \"6-10\" 20 tablet 0     ranitidine (ZANTAC) 150 MG tablet Take 1 tablet (150 mg) by mouth 2 times daily       senna-docusate (SENOKOT-S/PERICOLACE) 8.6-50 MG tablet Take 1 tablet by mouth 2 times daily as needed       sertraline (ZOLOFT) 50 MG tablet Take 50 mg by mouth At Bedtime          ROS:  10 point ROS of systems including Constitutional, Eyes, Respiratory, Cardiovascular, Gastroenterology, Genitourinary, Integumentary, Musculoskeletal, Psychiatric were all negative except for pertinent positives noted in my HPI.    Vitals:  /60   Pulse 78   Temp 97  F (36.1  C)   Resp 18   Ht 1.626 m (5' 4\")   Wt 51.3 kg (113 lb 1.6 oz)   SpO2 93%   BMI 19.41 kg/m    Exam:  GENERAL APPEARANCE:  Alert, in mild distress due to fatigue  ENT:  Mouth and posterior oropharynx normal, moist mucous membranes, normal hearing acuity  EYES:  EOM, conjunctivae, lids, pupils and irises normal  NECK:  No adenopathy,masses or thyromegaly  RESP:  respiratory effort and palpation of chest normal, lungs clear to auscultation , rhonchi chronic fibrotic changes  CV:  Palpation and auscultation of heart done , regular rate and rhythm, no murmur, rub, or gallop, no edema  ABDOMEN:  normal bowel sounds, soft, nontender, no hepatosplenomegaly or other masses  M/S:   Gait and station abnormal wheelchair, assist with mobility  Digits " and nails normal  SKIN:  Inspection of skin and subcutaneous tissue baseline, Palpation of skin and subcutaneous tissue baseline, unable to visualize incision  NEURO:   Examination of sensation by touch normal  PSYCH:  affect and mood normal, mild cognitive loss    Lab/Diagnostic data:  Recent labs in [facility] reviewed by me today.     ASSESSMENT/PLAN:  (S72.002A) Displaced fracture of left femoral neck (H)  (primary encounter diagnosis)  (Z96.7,  Z87.81) S/P ORIF (open reduction internal fixation) fracture  Comment: not at baseline, unable to walk or transfer independently  Plan: PT/OT  Daily hip dressing changes  Pain management with Oxycodone and APAP and ice and assist with repodisioning  Mobilize   mg bid for 1 month DVT/PE prophylaxis - monitor for GI distress, bleeding, bruising  Follow CBC - mild blood loss anemia  Bowel protocol while on narcotic pain medication      (I50.22) Chronic systolic congestive heart failure (H)  Comment: compensated  Plan: monitor for changes in weight, dyspnea  VS per facility protocol      (M30.0) PAN (polyarteritis nodosa) (H)  Comment: chronic  Plan: continue Azathioprine    (R53.81) Physical deconditioning  Comment: not at goal  Plan: PT/OT, fall precautions.   Care conference with patient and family for the progress of rehab and disposition issues will be discussed as planned.   Rehab evaluation and other evaluations including CPT are at rehab logs, to be reviewed separately.           Discharge goal is return to [facility] and ambulate independently with walker    (K21.9) Gastroesophageal reflux disease without esophagitis  Comment: high risk for changes due to being on ASA  Plan: Omeprazole daily  Ranitidine daily  Follow HGB BMP for early signs bleeding    Total time spent with patient visit at the skilled nursing facility was 35 min including patient visit and review of past records. Greater than 50% of total time spent with counseling and coordinating care due to extensive  chart review, discussion with nurse on current status, discussion with PT on initial assessment and goals.   Electronically signed by:  HADLEY Torres CNP

## 2019-07-09 VITALS
SYSTOLIC BLOOD PRESSURE: 116 MMHG | DIASTOLIC BLOOD PRESSURE: 74 MMHG | RESPIRATION RATE: 17 BRPM | BODY MASS INDEX: 19.41 KG/M2 | WEIGHT: 113.1 LBS | HEART RATE: 80 BPM | TEMPERATURE: 97.4 F | OXYGEN SATURATION: 94 %

## 2019-07-09 RX ORDER — POLYETHYLENE GLYCOL 3350 17 G/17G
1 POWDER, FOR SOLUTION ORAL DAILY
COMMUNITY
End: 2019-08-26

## 2019-07-09 NOTE — PROGRESS NOTES
[facility]  [place] Medical Record Number:  [id number]  Place of Service where encounter took place:  [facility] (S) [[id number]]  Chief Complaint   Patient presents with     RECHECK       HPI:    Rayne Mabry  is a 89 year old (4/26/1930), who is being seen today for an episodic care visit.  HPI information obtained from: facility chart records, facility staff, patient report and [facility] chart review. Today's concern is:  Left hip Fx: on exam today patient states left hip pain is well controlled, she is walking up to 150 feet using a RW with SBA and SBA with ADL's  Cognitive impairement: patient lives at [facility], SBT is 13/28  Constipation: patient states she had BM yesterday, denies abdominal pain or discomfort.     Past Medical and Surgical History reviewed in Epic today.    MEDICATIONS:  Current Outpatient Medications   Medication Sig Dispense Refill     acetaminophen (TYLENOL) 325 MG tablet Take 2 tablets (650 mg) by mouth every 4 hours as needed for mild pain 30 tablet 0     albuterol (PROAIR HFA/PROVENTIL HFA/VENTOLIN HFA) 108 (90 BASE) MCG/ACT Inhaler Inhale 1-2 puffs into the lungs every 4 hours as needed       aspirin (ASA) 325 MG EC tablet Take one Aspirin tab twice daily for 5 weeks. 70 tablet 0     azaTHIOprine (IMURAN) 50 MG tablet Take 150 mg by mouth daily       bismuth subsalicylate (PEPTO-BISMOL) 262 MG chewable tablet Take 2 tablets by mouth every hour as needed (max 8 tablets/24 hours)       calcium carbonate 500 mg, elemental, (OSCAL;OYSTER SHELL CALCIUM) 500 MG tablet Take 500 mg by mouth 2 times daily       ergocalciferol (ERGOCALCIFEROL) 97552 units capsule Take 50,000 Units by mouth once a week Every Tuesday       levothyroxine (SYNTHROID/LEVOTHROID) 88 MCG tablet Take 88 mcg by mouth daily        loratadine (CLARITIN) 10 MG tablet Take 10 mg by mouth daily       melatonin 3 MG tablet Take 3 mg by mouth At Bedtime        omeprazole (PRILOSEC) 20 MG CR  "capsule Take 20 mg by mouth daily Do not crush       ondansetron (ZOFRAN-ODT) 4 MG ODT tab Take 1 tablet (4 mg) by mouth every 6 hours as needed for nausea or vomiting 8 tablet 0     oxyCODONE (ROXICODONE) 5 MG tablet Take 0.5-1 tablets (2.5-5 mg) by mouth every 4 hours as needed 1/2 tab po q 4 hrs prn pain scale \"1-5\"  1 tab po q 4 hrs prn pain scale \"6-10\" 20 tablet 0     polyethylene glycol (MIRALAX/GLYCOLAX) packet Take 1 packet by mouth daily as needed for constipation       ranitidine (ZANTAC) 150 MG tablet Take 1 tablet (150 mg) by mouth 2 times daily       senna-docusate (SENOKOT-S/PERICOLACE) 8.6-50 MG tablet Take 1 tablet by mouth 2 times daily as needed       sertraline (ZOLOFT) 50 MG tablet Take 50 mg by mouth At Bedtime            REVIEW OF SYSTEMS:  10 point ROS of systems including Constitutional, Eyes, Respiratory, Cardiovascular, Gastroenterology, Genitourinary, Integumentary, Musculoskeletal, Psychiatric were all negative except for pertinent positives noted in my HPI.    Objective:  /74   Pulse 80   Temp 97.4  F (36.3  C)   Resp 17   Wt 51.3 kg (113 lb 1.6 oz)   SpO2 94%   BMI 19.41 kg/m    Exam:  GENERAL APPEARANCE:  Alert, in no distress  ENT:  Mouth and posterior oropharynx normal, moist mucous membranes, [place]  EYES:  EOM, conjunctivae, lids, pupils and irises normal, PERRL  RESP:  respiratory effort and palpation of chest normal, lungs clear to auscultation , no respiratory distress  CV:  Palpation and auscultation of heart done , regular rate and rhythm, no murmur, rub, or gallop, no edema  ABDOMEN:  normal bowel sounds, soft, nontender, no hepatosplenomegaly or other masses  M/S:   Examination of:   right upper extremity, left upper extremity and right lower extremity  Inspection, ROM, stability and muscle strength normal and decreased ROM and strength to LLE due to recent surgery  SKIN:  Inspection of skin and subcutaneous tissue baseline, did not visualize left hip " incision  NEURO:   Cranial nerves 2-12 are normal tested and grossly at patient's baseline, speech WNL  PSYCH:  affect and mood normal    Labs:   Recent labs in [facility] reviewed by me today.     ASSESSMENT/PLAN:  Displaced fracture of left femoral neck (H)  S/P ORIF (open reduction internal fixation) fracture  Physical deconditioning  Acute/ongoing: continue PT and OT for strengthening, schedule tylenol 650mg TID and q 6 hours prn,  Oxycodone 2.5-5mg q 4 hours prn, ASA 325mg BID for 5 weeks  F/u with ortho as directed    Chronic systolic congestive heart failure (H)  Ongoing: daily weights, vitals daily and prn, BMP follow, follow off Rx    Cognitive impairment  Ongoing: patient lives in correction, may need higher level of care, working with family    Drug-induced constipation  Acute/ongoing: continue senna s 1 PO BID prn, miralax 17gm QD prn      Orders written by provider at facility  BMP on Monday  Daily weights  Tylenol 650mg TID scheduled and q 6 hours prn    Total time spent with patient visit at the skilled nursing facility was 45 min including patient visit and review of past records. Greater than 50% of total time spent with counseling and coordinating care due to coordinating care with follow up labs and appointments, counseling patient on current medication reconciled from the hospital and counseling patient on the plan of SNF stay and projected length of stay.  Electronically signed by:  Tonya Lynn Haase, APRN CNP

## 2019-07-10 ENCOUNTER — NURSING HOME VISIT (OUTPATIENT)
Dept: GERIATRICS | Facility: CLINIC | Age: 84
End: 2019-07-10
Payer: COMMERCIAL

## 2019-07-10 DIAGNOSIS — Z87.81 S/P ORIF (OPEN REDUCTION INTERNAL FIXATION) FRACTURE: ICD-10-CM

## 2019-07-10 DIAGNOSIS — R41.89 COGNITIVE IMPAIRMENT: ICD-10-CM

## 2019-07-10 DIAGNOSIS — I50.22 CHRONIC SYSTOLIC CONGESTIVE HEART FAILURE (H): ICD-10-CM

## 2019-07-10 DIAGNOSIS — R53.81 PHYSICAL DECONDITIONING: ICD-10-CM

## 2019-07-10 DIAGNOSIS — Z98.890 S/P ORIF (OPEN REDUCTION INTERNAL FIXATION) FRACTURE: ICD-10-CM

## 2019-07-10 DIAGNOSIS — S72.002A DISPLACED FRACTURE OF LEFT FEMORAL NECK (H): Primary | ICD-10-CM

## 2019-07-10 DIAGNOSIS — K59.03 DRUG-INDUCED CONSTIPATION: ICD-10-CM

## 2019-07-10 PROCEDURE — 99310 SBSQ NF CARE HIGH MDM 45: CPT | Performed by: NURSE PRACTITIONER

## 2019-07-10 NOTE — LETTER
7/10/2019        RE: Rayne Mabry  [address]  [id number]  [facility] [address]        [facility]  [place] Medical Record Number:  [id number]  Place of Service where encounter took place:  [facility] (S) [[id number]]  Chief Complaint   Patient presents with     RECHECK       HPI:    Rayne Mabry  is a 89 year old (4/26/1930), who is being seen today for an episodic care visit.  HPI information obtained from: facility chart records, facility staff, patient report and [facility] chart review. Today's concern is:  Left hip Fx: on exam today patient states left hip pain is well controlled, she is walking up to 150 feet using a RW with SBA and SBA with ADL's  Cognitive impairement: patient lives at [facility], SBT is 13/28  Constipation: patient states she had BM yesterday, denies abdominal pain or discomfort.     Past Medical and Surgical History reviewed in Epic today.    MEDICATIONS:  Current Outpatient Medications   Medication Sig Dispense Refill     acetaminophen (TYLENOL) 325 MG tablet Take 2 tablets (650 mg) by mouth every 4 hours as needed for mild pain 30 tablet 0     albuterol (PROAIR HFA/PROVENTIL HFA/VENTOLIN HFA) 108 (90 BASE) MCG/ACT Inhaler Inhale 1-2 puffs into the lungs every 4 hours as needed       aspirin (ASA) 325 MG EC tablet Take one Aspirin tab twice daily for 5 weeks. 70 tablet 0     azaTHIOprine (IMURAN) 50 MG tablet Take 150 mg by mouth daily       bismuth subsalicylate (PEPTO-BISMOL) 262 MG chewable tablet Take 2 tablets by mouth every hour as needed (max 8 tablets/24 hours)       calcium carbonate 500 mg, elemental, (OSCAL;OYSTER SHELL CALCIUM) 500 MG tablet Take 500 mg by mouth 2 times daily       ergocalciferol (ERGOCALCIFEROL) 78873 units capsule Take 50,000 Units by mouth once a week Every Tuesday       levothyroxine (SYNTHROID/LEVOTHROID) 88 MCG tablet Take 88 mcg by mouth daily        loratadine (CLARITIN) 10 MG tablet Take 10 mg by mouth  "daily       melatonin 3 MG tablet Take 3 mg by mouth At Bedtime        omeprazole (PRILOSEC) 20 MG CR capsule Take 20 mg by mouth daily Do not crush       ondansetron (ZOFRAN-ODT) 4 MG ODT tab Take 1 tablet (4 mg) by mouth every 6 hours as needed for nausea or vomiting 8 tablet 0     oxyCODONE (ROXICODONE) 5 MG tablet Take 0.5-1 tablets (2.5-5 mg) by mouth every 4 hours as needed 1/2 tab po q 4 hrs prn pain scale \"1-5\"  1 tab po q 4 hrs prn pain scale \"6-10\" 20 tablet 0     polyethylene glycol (MIRALAX/GLYCOLAX) packet Take 1 packet by mouth daily as needed for constipation       ranitidine (ZANTAC) 150 MG tablet Take 1 tablet (150 mg) by mouth 2 times daily       senna-docusate (SENOKOT-S/PERICOLACE) 8.6-50 MG tablet Take 1 tablet by mouth 2 times daily as needed       sertraline (ZOLOFT) 50 MG tablet Take 50 mg by mouth At Bedtime            REVIEW OF SYSTEMS:  10 point ROS of systems including Constitutional, Eyes, Respiratory, Cardiovascular, Gastroenterology, Genitourinary, Integumentary, Musculoskeletal, Psychiatric were all negative except for pertinent positives noted in my HPI.    Objective:  /74   Pulse 80   Temp 97.4  F (36.3  C)   Resp 17   Wt 51.3 kg (113 lb 1.6 oz)   SpO2 94%   BMI 19.41 kg/m     Exam:  GENERAL APPEARANCE:  Alert, in no distress  ENT:  Mouth and posterior oropharynx normal, moist mucous membranes, [place]  EYES:  EOM, conjunctivae, lids, pupils and irises normal, PERRL  RESP:  respiratory effort and palpation of chest normal, lungs clear to auscultation , no respiratory distress  CV:  Palpation and auscultation of heart done , regular rate and rhythm, no murmur, rub, or gallop, no edema  ABDOMEN:  normal bowel sounds, soft, nontender, no hepatosplenomegaly or other masses  M/S:   Examination of:   right upper extremity, left upper extremity and right lower extremity  Inspection, ROM, stability and muscle strength normal and decreased ROM and strength to LLE due to recent " surgery  SKIN:  Inspection of skin and subcutaneous tissue baseline, did not visualize left hip incision  NEURO:   Cranial nerves 2-12 are normal tested and grossly at patient's baseline, speech WNL  PSYCH:  affect and mood normal    Labs:   Recent labs in [facility] reviewed by me today.     ASSESSMENT/PLAN:  Displaced fracture of left femoral neck (H)  S/P ORIF (open reduction internal fixation) fracture  Physical deconditioning  Acute/ongoing: continue PT and OT for strengthening, schedule tylenol 650mg TID and q 6 hours prn,  Oxycodone 2.5-5mg q 4 hours prn, ASA 325mg BID for 5 weeks  F/u with ortho as directed    Chronic systolic congestive heart failure (H)  Ongoing: daily weights, vitals daily and prn, BMP follow, follow off Rx    Cognitive impairment  Ongoing: patient lives in [facility], may need higher level of care, working with family    Drug-induced constipation  Acute/ongoing: continue senna s 1 PO BID prn, miralax 17gm QD prn      Orders written by provider at facility  BMP on Monday  Daily weights  Tylenol 650mg TID scheduled and q 6 hours prn    Total time spent with patient visit at the skilled nursing facility was 45 min including patient visit and review of past records. Greater than 50% of total time spent with counseling and coordinating care due to coordinating care with follow up labs and appointments, counseling patient on current medication reconciled from the hospital and counseling patient on the plan of SNF stay and projected length of stay.  Electronically signed by:  Tonya Lynn Haase, APRN CNP               Sincerely,        Tonya Lynn Haase, APRN CNP

## 2019-07-11 ENCOUNTER — TRANSFERRED RECORDS (OUTPATIENT)
Dept: HEALTH INFORMATION MANAGEMENT | Facility: CLINIC | Age: 84
End: 2019-07-11

## 2019-07-11 LAB
ANION GAP SERPL CALCULATED.3IONS-SCNC: 10 MMOL/L (ref 7–16)
BUN SERPL-MCNC: 16 MG/DL (ref 7–26)
CALCIUM SERPL-MCNC: 9.3 MG/DL (ref 8.4–10.4)
CHLORIDE SERPLBLD-SCNC: 103 MMOL/L (ref 98–109)
CO2 SERPL-SCNC: 26 MMOL/L (ref 20–29)
CREAT SERPL-MCNC: 0.56 MG/DL (ref 0.55–1.02)
GFR SERPL CREATININE-BSD FRML MDRD: >60 ML/MIN/1.73M2
GLUCOSE SERPL-MCNC: 76 MG/DL (ref 70–100)
HEMOGLOBIN: 10.7 G/DL (ref 12–15.5)
POTASSIUM SERPL-SCNC: 4.2 MMOL/L (ref 3.5–5.1)
SODIUM SERPL-SCNC: 139 MMOL/L (ref 136–145)

## 2019-07-12 ENCOUNTER — DISCHARGE SUMMARY NURSING HOME (OUTPATIENT)
Dept: GERIATRICS | Facility: CLINIC | Age: 84
End: 2019-07-12
Payer: COMMERCIAL

## 2019-07-12 VITALS
HEART RATE: 78 BPM | OXYGEN SATURATION: 96 % | SYSTOLIC BLOOD PRESSURE: 100 MMHG | RESPIRATION RATE: 18 BRPM | BODY MASS INDEX: 19.44 KG/M2 | WEIGHT: 113.9 LBS | TEMPERATURE: 97 F | DIASTOLIC BLOOD PRESSURE: 61 MMHG | HEIGHT: 64 IN

## 2019-07-12 DIAGNOSIS — Z98.890 S/P ORIF (OPEN REDUCTION INTERNAL FIXATION) FRACTURE: ICD-10-CM

## 2019-07-12 DIAGNOSIS — Z87.81 S/P ORIF (OPEN REDUCTION INTERNAL FIXATION) FRACTURE: ICD-10-CM

## 2019-07-12 DIAGNOSIS — I50.22 CHRONIC SYSTOLIC CONGESTIVE HEART FAILURE (H): ICD-10-CM

## 2019-07-12 DIAGNOSIS — D62 ANEMIA DUE TO BLOOD LOSS, ACUTE: ICD-10-CM

## 2019-07-12 DIAGNOSIS — K21.9 GASTROESOPHAGEAL REFLUX DISEASE WITHOUT ESOPHAGITIS: ICD-10-CM

## 2019-07-12 DIAGNOSIS — R41.89 COGNITIVE IMPAIRMENT: ICD-10-CM

## 2019-07-12 DIAGNOSIS — S72.002A DISPLACED FRACTURE OF LEFT FEMORAL NECK (H): Primary | ICD-10-CM

## 2019-07-12 DIAGNOSIS — M30.0 PAN (POLYARTERITIS NODOSA) (H): ICD-10-CM

## 2019-07-12 DIAGNOSIS — R53.81 PHYSICAL DECONDITIONING: ICD-10-CM

## 2019-07-12 PROCEDURE — 99315 NF DSCHRG MGMT 30 MIN/LESS: CPT | Performed by: NURSE PRACTITIONER

## 2019-07-12 ASSESSMENT — MIFFLIN-ST. JEOR: SCORE: 926.65

## 2019-07-12 NOTE — LETTER
7/12/2019        RE: Rayne Mabry  [id number]  [place] [address]        [place] [facility] DISCHARGE SUMMARY  PATIENT'S NAME: Rayne Mabry  YOB: 1930  MEDICAL RECORD NUMBER:  [id number]  Place of Service where encounter took place:  [facility] [[id number]]    PRIMARY CARE PROVIDER AND CLINIC RESPONSIBLE AFTER TRANSFER:   Susan Hanley, [facility] [address]  [facility] [address]    [facility] Provider     Transferring providers: HADLEY Porter CNP, Jeovanny Moses MD  Recent Hospitalization/ED:  [facility] Hospital stay 6/30/2019 to 7/4/2019.  Date of SNF Admission: June / 30 / 2019  Date of SNF (anticipated) Discharge: July / 15 / 2019  Discharged to: previous assisted living  Cognitive Scores: BIMS 13/15, SBT 13/28, CPT: 4/4/5.6  Physical Function: 4WW x 200 ft with SBA/CGA  DME: Walker    CODE STATUS/ADVANCE DIRECTIVES DISCUSSION:  DNR   ALLERGIES: Zolpidem    Per recent TCU provider progress notes:  89 year old female hospitalized after a mechanical fall and sustained a left hip fracture and underwent percutaneous fixation synthesis femoral neck system. She had a slight HGB loss from 12.7 to 10.7 otherwise her hospital course was uneventful.  Pain managed with tylenol and oxycodone. Needs asa x 1 month for DVT prophylaxis. On Azathioprine for PAN. Ready to discharge to halfway next week.     DISCHARGE DIAGNOSIS/NURSING FACILITY COURSE:   Displaced fracture of left femoral neck (H)  S/P ORIF (open reduction internal fixation) fracture  Physical deconditioning  Progressing in therapies, looking at discharge next week back to [facility]. Pain managed well with current meds. Ortho f/u as recommended.     Chronic systolic congestive heart failure (H)  Chronic, stable. Recent SBP range  and wt up 1 lb since admission. Sats 96% on room air. Monitoring.   Lab Results   Component Value Date    CR 0.56  07/11/2019    CR 0.59 07/04/2019       PAN (polyarteritis nodosa) (H)  Chronic, well managed. Meds as PTA.     Gastroesophageal reflux disease without esophagitis  Chronic, meds as above. Monitor.     Anemia due to blood loss, acute  Stable.   Lab Results   Component Value Date    HGB 10.7 07/11/2019    HGB 10.7 07/03/2019     Cognitive impairment  Ongoing issue - back to [facility], monitoring for safety.     Past Medical History:  has a past medical history of Acute CHF (H), Anxiety, Arthritis, Chronic anemia, Closed left hip fracture (H), Depression, Fall (07/01/2019), GERD (gastroesophageal reflux disease), Hypoxia, MAC (mycobacterium avium-intracellulare complex), Mild cognitive disorder, Polyarteritis nodosa (H), Polyneuropathy, Thyroid disease, and UTI (urinary tract infection) (07/04/2019).    Discharge Medications:  Current Outpatient Medications   Medication Sig Dispense Refill     acetaminophen (TYLENOL) 325 MG tablet Take 2 tablets (650 mg) by mouth every 4 hours as needed for mild pain 30 tablet 0     albuterol (PROAIR HFA/PROVENTIL HFA/VENTOLIN HFA) 108 (90 BASE) MCG/ACT Inhaler Inhale 1-2 puffs into the lungs every 4 hours as needed       aspirin (ASA) 325 MG EC tablet Take one Aspirin tab twice daily for 5 weeks. 70 tablet 0     azaTHIOprine (IMURAN) 50 MG tablet Take 150 mg by mouth daily       bismuth subsalicylate (PEPTO-BISMOL) 262 MG chewable tablet Take 2 tablets by mouth every hour as needed (max 8 tablets/24 hours)       calcium carbonate 500 mg, elemental, (OSCAL;OYSTER SHELL CALCIUM) 500 MG tablet Take 500 mg by mouth 2 times daily       ergocalciferol (ERGOCALCIFEROL) 48825 units capsule Take 50,000 Units by mouth once a week Every Tuesday       levothyroxine (SYNTHROID/LEVOTHROID) 88 MCG tablet Take 88 mcg by mouth daily        loratadine (CLARITIN) 10 MG tablet Take 10 mg by mouth daily       melatonin 3 MG tablet Take 3 mg by mouth At Bedtime        omeprazole (PRILOSEC) 20 MG CR capsule Take  "20 mg by mouth daily Do not crush       ondansetron (ZOFRAN-ODT) 4 MG ODT tab Take 1 tablet (4 mg) by mouth every 6 hours as needed for nausea or vomiting 8 tablet 0     oxyCODONE (ROXICODONE) 5 MG tablet Take 0.5-1 tablets (2.5-5 mg) by mouth every 4 hours as needed 1/2 tab po q 4 hrs prn pain scale \"1-5\"  1 tab po q 4 hrs prn pain scale \"6-10\" 20 tablet 0     polyethylene glycol (MIRALAX/GLYCOLAX) packet Take 1 packet by mouth daily        ranitidine (ZANTAC) 150 MG tablet Take 1 tablet (150 mg) by mouth 2 times daily       senna-docusate (SENOKOT-S/PERICOLACE) 8.6-50 MG tablet Take 1 tablet by mouth 2 times daily as needed       sertraline (ZOLOFT) 50 MG tablet Take 50 mg by mouth At Bedtime          Medication Changes/Rationale:     Scheduled tylenol for pain    Controlled medications sent with patient:   Script for oxycodone 2.5 mg doses medication for 14 tabs and 0 refills given to patient at discharge to have them fill at their out patient pharmacy     ROS:   4 point ROS including Respiratory, CV, GI and , other than that noted in the HPI,  is negative    Physical Exam:   Vitals: /61   Pulse 78   Temp 97  F (36.1  C)   Resp 18   Ht 1.626 m (5' 4\")   Wt 51.7 kg (113 lb 14.4 oz)   SpO2 96%   BMI 19.55 kg/m     BMI= Body mass index is 19.55 kg/m .  GENERAL APPEARANCE:  Alert, in no distress, pleasant, cooperative, oriented x 3  EYES:  EOM, lids, pupils and irises normal, sclera clear and conjunctiva normal, no discharge or mattering on lids or lashes noted  ENT:  Mouth normal, moist mucous membranes, nose normal without drainage or crusting, external ears without lesions, hearing acuity intact  RESP:  respiratory effort normal, no chest wall tenderness, no respiratory distress, Lung sounds clear, patient is on room air  CV:  Auscultation of heart done, rate and rhythm controlled and regular, no murmur, no rub or gallop. Edema trace ankles bilateral lower extremities.  ABDOMEN:  normal bowel " sounds, soft, nontender, no palpable masses.  M/S:   Gait and station ambulates independently with walker and walks with assist , no tenderness or swelling of the joints; able to move all extremities, digits normal  NEURO: cranial nerves 2-12 grossly intact, no facial asymmetry, no speech deficits and able to follow directions, moves all extremities symmetrically  PSYCH:  insight and judgement and memory impaired, affect and mood normal     SNF labs:   Most Recent 3 CBC's:  Recent Labs   Lab Test 07/11/19 07/04/19  0541 07/03/19 0619 07/02/19 0635 07/01/19 2207 06/30/19  1435 09/18/17 08/17/17   WBC  --   --   --   --   --  9.3 7.9 9.9   HGB 10.7*  --  10.7* 11.4*  --  12.7 12.7 12.4   MCV  --   --   --   --   --  101* 94 96   PLT  --  233  --   --  220 273 424 515*     Most Recent 3 BMP's:  Recent Labs   Lab Test 07/11/19 07/04/19  0541 07/03/19 0619 07/02/19 0635 07/01/19 2207 07/01/19  0649 06/30/19  1435     --   --   --   --  141 140   POTASSIUM 4.2  --   --   --   --  4.0 4.0   CHLORIDE 103  --   --   --   --  110* 106   CO2 26  --   --   --   --  28 30   BUN 16  --   --   --   --  13 18   CR 0.56 0.59  --   --  0.57 0.65 0.68   ANIONGAP 10  --   --   --   --  3 4   MICHELLE 9.3  --   --   --   --  8.4* 9.5   GLC 76  --  102* 86  --  88 87     DISCHARGE PLAN:    Follow up labs: No labs orders/due    Medical Follow Up:        Follow up with primary care provider in 2-3 weeks and f/u ortho as recommended    MTM referral needed and placed by this provider: No    Current [place] scheduled appointments:   No future appointments.     Discharge Services: [facility]:  Occupational Therapy, Physical Therapy, Registered Nurse and Home Health Aide    Discharge Instructions Verbalized to Patient at Discharge:     None      TOTAL DISCHARGE TIME:   Less than or equal to 30 minutes  Electronically signed by:  HADLEY Porter CNP     Documentation of Face to Face and Certification for Home Health  Services    I certify that patient: Rayne Mabry is under my care and that I, or a nurse practitioner or physician's assistant working with me, had a face-to-face encounter that meets the physician face-to-face encounter requirements with this patient on: 7/12/2019.    This encounter with the patient was in whole, or in part, for the following medical condition, which is the primary reason for home health care: weakness after fall and femur fracture, repair.    I certify that, based on my findings, the following services are medically necessary home health services: Nursing, Occupational Therapy, Physical Therapy and HHA.    My clinical findings support the need for the above services because: Nurse is needed: To assess status after changes in medications or other medical regimen.., Occupational Therapy Services are needed to assess and treat cognitive ability and address ADL safety due to impairment in self care ability., Physical Therapy Services are needed to assess and treat the following functional impairments: weakness. and HHA for bathing    Further, I certify that my clinical findings support that this patient is homebound (i.e. absences from home require considerable and taxing effort and are for medical reasons or Mu-[facility] services or infrequently or of short duration when for other reasons) because: Requires assistance of another person or specialized equipment to access medical services because patient: Has prohibitive pain during ambulation., Is prone to wander/get lost without assistance. and Is unable to walk greater than 200 feet without rest...    Based on the above findings. I certify that this patient is confined to the home and needs intermittent skilled nursing care, physical therapy and/or speech therapy.  The patient is under my care, and I have initiated the establishment of the plan of care.  This patient will be followed by a physician who will periodically review the plan of  care.  Physician/Provider to provide follow up care: Susan Hanley    Attending hospital physician (the Medicare certified [facility] provider): Dr Jeovanny Moses  Physician Signature: See electronic signature associated with these discharge orders.  Date: 7/12/2019                  Sincerely,        HADLEY Porter CNP

## 2019-07-12 NOTE — PROGRESS NOTES
[facility] [facility] DISCHARGE SUMMARY  PATIENT'S NAME: Rayne Mabry  YOB: 1930  MEDICAL RECORD NUMBER:  [id number]  Place of Service where encounter took place:  [facility] ([facility] [[id number]]    PRIMARY CARE PROVIDER AND CLINIC RESPONSIBLE AFTER TRANSFER:   Susan Hanley [facility] [address] / [place] [address]    [facility] Provider     Transferring providers: HADLEY Porter CNP, Jeovanny Moses MD  Recent Hospitalization/ED:  [facility] Hospital stay 6/30/2019 to 7/4/2019.  Date of SNF Admission: June / 30 / 2019  Date of SNF (anticipated) Discharge: July / 15 / 2019  Discharged to: previous assisted living  Cognitive Scores: BIMS 13/15, SBT 13/28, CPT: 4/4/5.6  Physical Function: 4WW x 200 ft with SBA/CGA  DME: Walker    CODE STATUS/ADVANCE DIRECTIVES DISCUSSION:  DNR   ALLERGIES: Zolpidem    Per recent TCU provider progress notes:  89 year old female hospitalized after a mechanical fall and sustained a left hip fracture and underwent percutaneous fixation synthesis femoral neck system. She had a slight HGB loss from 12.7 to 10.7 otherwise her hospital course was uneventful.  Pain managed with tylenol and oxycodone. Needs asa x 1 month for DVT prophylaxis. On Azathioprine for PAN. Ready to discharge to intermediate next week.     DISCHARGE DIAGNOSIS/NURSING FACILITY COURSE:   Displaced fracture of left femoral neck (H)  S/P ORIF (open reduction internal fixation) fracture  Physical deconditioning  Progressing in therapies, looking at discharge next week back to intermediate. Pain managed well with current meds. Ortho f/u as recommended.     Chronic systolic congestive heart failure (H)  Chronic, stable. Recent SBP range  and wt up 1 lb since admission. Sats 96% on room air. Monitoring.   Lab Results   Component Value Date    CR 0.56 07/11/2019    CR 0.59 07/04/2019       PAN (polyarteritis nodosa) (H)  Chronic, well managed. Meds as PTA.      Gastroesophageal reflux disease without esophagitis  Chronic, meds as above. Monitor.     Anemia due to blood loss, acute  Stable.   Lab Results   Component Value Date    HGB 10.7 07/11/2019    HGB 10.7 07/03/2019     Cognitive impairment  Ongoing issue - back to [facility], monitoring for safety.     Past Medical History:  has a past medical history of Acute CHF (H), Anxiety, Arthritis, Chronic anemia, Closed left hip fracture (H), Depression, Fall (07/01/2019), GERD (gastroesophageal reflux disease), Hypoxia, MAC (mycobacterium avium-intracellulare complex), Mild cognitive disorder, Polyarteritis nodosa (H), Polyneuropathy, Thyroid disease, and UTI (urinary tract infection) (07/04/2019).    Discharge Medications:  Current Outpatient Medications   Medication Sig Dispense Refill     acetaminophen (TYLENOL) 325 MG tablet Take 2 tablets (650 mg) by mouth every 4 hours as needed for mild pain 30 tablet 0     albuterol (PROAIR HFA/PROVENTIL HFA/VENTOLIN HFA) 108 (90 BASE) MCG/ACT Inhaler Inhale 1-2 puffs into the lungs every 4 hours as needed       aspirin (ASA) 325 MG EC tablet Take one Aspirin tab twice daily for 5 weeks. 70 tablet 0     azaTHIOprine (IMURAN) 50 MG tablet Take 150 mg by mouth daily       bismuth subsalicylate (PEPTO-BISMOL) 262 MG chewable tablet Take 2 tablets by mouth every hour as needed (max 8 tablets/24 hours)       calcium carbonate 500 mg, elemental, (OSCAL;OYSTER SHELL CALCIUM) 500 MG tablet Take 500 mg by mouth 2 times daily       ergocalciferol (ERGOCALCIFEROL) 15457 units capsule Take 50,000 Units by mouth once a week Every Tuesday       levothyroxine (SYNTHROID/LEVOTHROID) 88 MCG tablet Take 88 mcg by mouth daily        loratadine (CLARITIN) 10 MG tablet Take 10 mg by mouth daily       melatonin 3 MG tablet Take 3 mg by mouth At Bedtime        omeprazole (PRILOSEC) 20 MG CR capsule Take 20 mg by mouth daily Do not crush       ondansetron (ZOFRAN-ODT) 4 MG ODT tab Take 1 tablet (4 mg) by mouth  "every 6 hours as needed for nausea or vomiting 8 tablet 0     oxyCODONE (ROXICODONE) 5 MG tablet Take 0.5-1 tablets (2.5-5 mg) by mouth every 4 hours as needed 1/2 tab po q 4 hrs prn pain scale \"1-5\"  1 tab po q 4 hrs prn pain scale \"6-10\" 20 tablet 0     polyethylene glycol (MIRALAX/GLYCOLAX) packet Take 1 packet by mouth daily        ranitidine (ZANTAC) 150 MG tablet Take 1 tablet (150 mg) by mouth 2 times daily       senna-docusate (SENOKOT-S/PERICOLACE) 8.6-50 MG tablet Take 1 tablet by mouth 2 times daily as needed       sertraline (ZOLOFT) 50 MG tablet Take 50 mg by mouth At Bedtime          Medication Changes/Rationale:     Scheduled tylenol for pain    Controlled medications sent with patient:   Script for oxycodone 2.5 mg doses medication for 14 tabs and 0 refills given to patient at discharge to have them fill at their out patient pharmacy     ROS:   4 point ROS including Respiratory, CV, GI and , other than that noted in the HPI,  is negative    Physical Exam:   Vitals: /61   Pulse 78   Temp 97  F (36.1  C)   Resp 18   Ht 1.626 m (5' 4\")   Wt 51.7 kg (113 lb 14.4 oz)   SpO2 96%   BMI 19.55 kg/m    BMI= Body mass index is 19.55 kg/m .  GENERAL APPEARANCE:  Alert, in no distress, pleasant, cooperative, oriented x 3  EYES:  EOM, lids, pupils and irises normal, sclera clear and conjunctiva normal, no discharge or mattering on lids or lashes noted  ENT:  Mouth normal, moist mucous membranes, nose normal without drainage or crusting, external ears without lesions, hearing acuity intact  RESP:  respiratory effort normal, no chest wall tenderness, no respiratory distress, Lung sounds clear, patient is on room air  CV:  Auscultation of heart done, rate and rhythm controlled and regular, no murmur, no rub or gallop. Edema trace ankles bilateral lower extremities.  ABDOMEN:  normal bowel sounds, soft, nontender, no palpable masses.  M/S:   Gait and station ambulates independently with walker and walks " with assist , no tenderness or swelling of the joints; able to move all extremities, digits normal  NEURO: cranial nerves 2-12 grossly intact, no facial asymmetry, no speech deficits and able to follow directions, moves all extremities symmetrically  PSYCH:  insight and judgement and memory impaired, affect and mood normal     SNF labs:   Most Recent 3 CBC's:  Recent Labs   Lab Test 07/11/19 07/04/19  0541 07/03/19 0619 07/02/19 0635 07/01/19 2207 06/30/19  1435 09/18/17 08/17/17   WBC  --   --   --   --   --  9.3 7.9 9.9   HGB 10.7*  --  10.7* 11.4*  --  12.7 12.7 12.4   MCV  --   --   --   --   --  101* 94 96   PLT  --  233  --   --  220 273 424 515*     Most Recent 3 BMP's:  Recent Labs   Lab Test 07/11/19 07/04/19  0541 07/03/19 0619 07/02/19 0635 07/01/19 2207 07/01/19  0649 06/30/19  1435     --   --   --   --  141 140   POTASSIUM 4.2  --   --   --   --  4.0 4.0   CHLORIDE 103  --   --   --   --  110* 106   CO2 26  --   --   --   --  28 30   BUN 16  --   --   --   --  13 18   CR 0.56 0.59  --   --  0.57 0.65 0.68   ANIONGAP 10  --   --   --   --  3 4   MICHELLE 9.3  --   --   --   --  8.4* 9.5   GLC 76  --  102* 86  --  88 87     DISCHARGE PLAN:    Follow up labs: No labs orders/due    Medical Follow Up:        Follow up with primary care provider in 2-3 weeks and f/u ortho as recommended    MTM referral needed and placed by this provider: No    Current [place] scheduled appointments:   No future appointments.     Discharge Services: [facility]:  Occupational Therapy, Physical Therapy, Registered Nurse and Home Health Aide    Discharge Instructions Verbalized to Patient at Discharge:     None      TOTAL DISCHARGE TIME:   Less than or equal to 30 minutes  Electronically signed by:  HADLEY Porter CNP     Documentation of Face to Face and Certification for Home Health Services    I certify that patient: Rayne A Raghavendra is under my care and that I, or a nurse practitioner or physician's  assistant working with me, had a face-to-face encounter that meets the physician face-to-face encounter requirements with this patient on: 7/12/2019.    This encounter with the patient was in whole, or in part, for the following medical condition, which is the primary reason for home health care: weakness after fall and femur fracture, repair.    I certify that, based on my findings, the following services are medically necessary home health services: Nursing, Occupational Therapy, Physical Therapy and HHA.    My clinical findings support the need for the above services because: Nurse is needed: To assess status after changes in medications or other medical regimen.., Occupational Therapy Services are needed to assess and treat cognitive ability and address ADL safety due to impairment in self care ability., Physical Therapy Services are needed to assess and treat the following functional impairments: weakness. and HHA for bathing    Further, I certify that my clinical findings support that this patient is homebound (i.e. absences from home require considerable and taxing effort and are for medical reasons or [facility] services or infrequently or of short duration when for other reasons) because: Requires assistance of another person or specialized equipment to access medical services because patient: Has prohibitive pain during ambulation., Is prone to wander/get lost without assistance. and Is unable to walk greater than 200 feet without rest...    Based on the above findings. I certify that this patient is confined to the home and needs intermittent skilled nursing care, physical therapy and/or speech therapy.  The patient is under my care, and I have initiated the establishment of the plan of care.  This patient will be followed by a physician who will periodically review the plan of care.  Physician/Provider to provide follow up care: Susan Hanley    Attending [facility] physician (the Medicare certified  [facility] provider): Dr Jeovanny Moses  Physician Signature: See electronic signature associated with these discharge orders.  Date: 7/12/2019

## 2019-08-26 ENCOUNTER — APPOINTMENT (OUTPATIENT)
Dept: GENERAL RADIOLOGY | Facility: CLINIC | Age: 84
DRG: 470 | End: 2019-08-26
Attending: EMERGENCY MEDICINE
Payer: COMMERCIAL

## 2019-08-26 ENCOUNTER — HOSPITAL ENCOUNTER (INPATIENT)
Facility: CLINIC | Age: 84
LOS: 4 days | Discharge: NURSING, REHAB OR LONG-TERM CARE | DRG: 470 | End: 2019-08-30
Attending: EMERGENCY MEDICINE | Admitting: INTERNAL MEDICINE
Payer: COMMERCIAL

## 2019-08-26 DIAGNOSIS — S72.002A HIP FRACTURE REQUIRING OPERATIVE REPAIR, LEFT, CLOSED, INITIAL ENCOUNTER (H): Primary | ICD-10-CM

## 2019-08-26 DIAGNOSIS — S72.002A HIP FRACTURE, LEFT, CLOSED, INITIAL ENCOUNTER (H): ICD-10-CM

## 2019-08-26 LAB
ANION GAP SERPL CALCULATED.3IONS-SCNC: 2 MMOL/L (ref 3–14)
BUN SERPL-MCNC: 15 MG/DL (ref 7–30)
CALCIUM SERPL-MCNC: 10 MG/DL (ref 8.5–10.1)
CHLORIDE SERPL-SCNC: 105 MMOL/L (ref 94–109)
CO2 SERPL-SCNC: 33 MMOL/L (ref 20–32)
CREAT SERPL-MCNC: 0.5 MG/DL (ref 0.52–1.04)
ERYTHROCYTE [DISTWIDTH] IN BLOOD BY AUTOMATED COUNT: 14.3 % (ref 10–15)
GFR SERPL CREATININE-BSD FRML MDRD: 86 ML/MIN/{1.73_M2}
GLUCOSE SERPL-MCNC: 98 MG/DL (ref 70–99)
HCT VFR BLD AUTO: 38.1 % (ref 35–47)
HGB BLD-MCNC: 12.7 G/DL (ref 11.7–15.7)
MCH RBC QN AUTO: 33.9 PG (ref 26.5–33)
MCHC RBC AUTO-ENTMCNC: 33.3 G/DL (ref 31.5–36.5)
MCV RBC AUTO: 102 FL (ref 78–100)
PLATELET # BLD AUTO: 368 10E9/L (ref 150–450)
POTASSIUM SERPL-SCNC: 4.1 MMOL/L (ref 3.4–5.3)
RBC # BLD AUTO: 3.75 10E12/L (ref 3.8–5.2)
SODIUM SERPL-SCNC: 140 MMOL/L (ref 133–144)
WBC # BLD AUTO: 11.9 10E9/L (ref 4–11)

## 2019-08-26 PROCEDURE — 27508 TREATMENT OF THIGH FRACTURE: CPT | Mod: LT

## 2019-08-26 PROCEDURE — 73502 X-RAY EXAM HIP UNI 2-3 VIEWS: CPT

## 2019-08-26 PROCEDURE — 99285 EMERGENCY DEPT VISIT HI MDM: CPT | Mod: 25

## 2019-08-26 PROCEDURE — 99222 1ST HOSP IP/OBS MODERATE 55: CPT | Mod: AI | Performed by: INTERNAL MEDICINE

## 2019-08-26 PROCEDURE — 71045 X-RAY EXAM CHEST 1 VIEW: CPT

## 2019-08-26 PROCEDURE — 85027 COMPLETE CBC AUTOMATED: CPT | Performed by: EMERGENCY MEDICINE

## 2019-08-26 PROCEDURE — 12000000 ZZH R&B MED SURG/OB

## 2019-08-26 PROCEDURE — 25000132 ZZH RX MED GY IP 250 OP 250 PS 637: Performed by: EMERGENCY MEDICINE

## 2019-08-26 PROCEDURE — 80048 BASIC METABOLIC PNL TOTAL CA: CPT | Performed by: EMERGENCY MEDICINE

## 2019-08-26 PROCEDURE — 72100 X-RAY EXAM L-S SPINE 2/3 VWS: CPT

## 2019-08-26 RX ORDER — HYDRALAZINE HYDROCHLORIDE 20 MG/ML
10 INJECTION INTRAMUSCULAR; INTRAVENOUS EVERY 4 HOURS PRN
Status: DISCONTINUED | OUTPATIENT
Start: 2019-08-26 | End: 2019-08-30 | Stop reason: HOSPADM

## 2019-08-26 RX ORDER — POTASSIUM CL/LIDO/0.9 % NACL 10MEQ/0.1L
10 INTRAVENOUS SOLUTION, PIGGYBACK (ML) INTRAVENOUS
Status: DISCONTINUED | OUTPATIENT
Start: 2019-08-26 | End: 2019-08-30 | Stop reason: HOSPADM

## 2019-08-26 RX ORDER — ONDANSETRON 2 MG/ML
4 INJECTION INTRAMUSCULAR; INTRAVENOUS EVERY 6 HOURS PRN
Status: DISCONTINUED | OUTPATIENT
Start: 2019-08-26 | End: 2019-08-30 | Stop reason: HOSPADM

## 2019-08-26 RX ORDER — POTASSIUM CHLORIDE 1500 MG/1
20-40 TABLET, EXTENDED RELEASE ORAL
Status: DISCONTINUED | OUTPATIENT
Start: 2019-08-26 | End: 2019-08-30 | Stop reason: HOSPADM

## 2019-08-26 RX ORDER — ACETAMINOPHEN 650 MG/1
650 SUPPOSITORY RECTAL EVERY 4 HOURS PRN
Status: DISCONTINUED | OUTPATIENT
Start: 2019-08-26 | End: 2019-08-30 | Stop reason: HOSPADM

## 2019-08-26 RX ORDER — BISMUTH SUBSALICYLATE 262 MG/1
2 TABLET, CHEWABLE ORAL
Status: DISCONTINUED | OUTPATIENT
Start: 2019-08-26 | End: 2019-08-27

## 2019-08-26 RX ORDER — LEVOTHYROXINE SODIUM 88 UG/1
88 TABLET ORAL DAILY
Status: DISCONTINUED | OUTPATIENT
Start: 2019-08-27 | End: 2019-08-30 | Stop reason: HOSPADM

## 2019-08-26 RX ORDER — NALOXONE HYDROCHLORIDE 0.4 MG/ML
.1-.4 INJECTION, SOLUTION INTRAMUSCULAR; INTRAVENOUS; SUBCUTANEOUS
Status: DISCONTINUED | OUTPATIENT
Start: 2019-08-26 | End: 2019-08-30 | Stop reason: HOSPADM

## 2019-08-26 RX ORDER — SENNOSIDES A AND B 8.6 MG/1
1 TABLET, FILM COATED ORAL DAILY PRN
COMMUNITY

## 2019-08-26 RX ORDER — ACETAMINOPHEN 325 MG/1
650 TABLET ORAL EVERY 4 HOURS PRN
Status: DISCONTINUED | OUTPATIENT
Start: 2019-08-26 | End: 2019-08-30 | Stop reason: HOSPADM

## 2019-08-26 RX ORDER — LIDOCAINE 40 MG/G
CREAM TOPICAL
Status: DISCONTINUED | OUTPATIENT
Start: 2019-08-26 | End: 2019-08-30 | Stop reason: HOSPADM

## 2019-08-26 RX ORDER — ALBUTEROL SULFATE 90 UG/1
1-2 AEROSOL, METERED RESPIRATORY (INHALATION) EVERY 4 HOURS PRN
Status: DISCONTINUED | OUTPATIENT
Start: 2019-08-26 | End: 2019-08-30 | Stop reason: HOSPADM

## 2019-08-26 RX ORDER — POTASSIUM CHLORIDE 1.5 G/1.58G
20-40 POWDER, FOR SOLUTION ORAL
Status: DISCONTINUED | OUTPATIENT
Start: 2019-08-26 | End: 2019-08-30 | Stop reason: HOSPADM

## 2019-08-26 RX ORDER — MORPHINE SULFATE 2 MG/ML
1 INJECTION, SOLUTION INTRAMUSCULAR; INTRAVENOUS
Status: DISCONTINUED | OUTPATIENT
Start: 2019-08-26 | End: 2019-08-30 | Stop reason: HOSPADM

## 2019-08-26 RX ORDER — FLUTICASONE PROPIONATE 50 MCG
2 SPRAY, SUSPENSION (ML) NASAL DAILY
Status: DISCONTINUED | OUTPATIENT
Start: 2019-08-27 | End: 2019-08-30 | Stop reason: HOSPADM

## 2019-08-26 RX ORDER — POTASSIUM CHLORIDE 29.8 MG/ML
20 INJECTION INTRAVENOUS
Status: DISCONTINUED | OUTPATIENT
Start: 2019-08-26 | End: 2019-08-30 | Stop reason: HOSPADM

## 2019-08-26 RX ORDER — PREDNISONE 5 MG/1
20 TABLET ORAL DAILY
Status: ON HOLD | COMMUNITY
End: 2019-08-30

## 2019-08-26 RX ORDER — ONDANSETRON 4 MG/1
4 TABLET, ORALLY DISINTEGRATING ORAL EVERY 6 HOURS PRN
Status: DISCONTINUED | OUTPATIENT
Start: 2019-08-26 | End: 2019-08-30 | Stop reason: HOSPADM

## 2019-08-26 RX ORDER — SODIUM CHLORIDE 9 MG/ML
INJECTION, SOLUTION INTRAVENOUS CONTINUOUS
Status: DISCONTINUED | OUTPATIENT
Start: 2019-08-26 | End: 2019-08-28

## 2019-08-26 RX ORDER — LANOLIN ALCOHOL/MO/W.PET/CERES
3 CREAM (GRAM) TOPICAL AT BEDTIME
Status: DISCONTINUED | OUTPATIENT
Start: 2019-08-26 | End: 2019-08-30 | Stop reason: HOSPADM

## 2019-08-26 RX ORDER — FLUTICASONE PROPIONATE 50 MCG
2 SPRAY, SUSPENSION (ML) NASAL DAILY
COMMUNITY

## 2019-08-26 RX ORDER — AMOXICILLIN 250 MG
1 CAPSULE ORAL 2 TIMES DAILY PRN
Status: DISCONTINUED | OUTPATIENT
Start: 2019-08-26 | End: 2019-08-30 | Stop reason: HOSPADM

## 2019-08-26 RX ORDER — AMOXICILLIN 250 MG
2 CAPSULE ORAL 2 TIMES DAILY PRN
Status: DISCONTINUED | OUTPATIENT
Start: 2019-08-26 | End: 2019-08-30 | Stop reason: HOSPADM

## 2019-08-26 RX ORDER — POTASSIUM CHLORIDE 7.45 MG/ML
10 INJECTION INTRAVENOUS
Status: DISCONTINUED | OUTPATIENT
Start: 2019-08-26 | End: 2019-08-30 | Stop reason: HOSPADM

## 2019-08-26 RX ADMIN — OXYCODONE HYDROCHLORIDE 2.5 MG: 5 TABLET ORAL at 21:07

## 2019-08-26 ASSESSMENT — MIFFLIN-ST. JEOR: SCORE: 947.52

## 2019-08-26 ASSESSMENT — ENCOUNTER SYMPTOMS
MYALGIAS: 1
FEVER: 0
ARTHRALGIAS: 1
CHILLS: 0
NUMBNESS: 1
BACK PAIN: 1
NAUSEA: 1

## 2019-08-27 ENCOUNTER — APPOINTMENT (OUTPATIENT)
Dept: GENERAL RADIOLOGY | Facility: CLINIC | Age: 84
DRG: 470 | End: 2019-08-27
Attending: PHYSICIAN ASSISTANT
Payer: COMMERCIAL

## 2019-08-27 ENCOUNTER — ANESTHESIA (OUTPATIENT)
Dept: SURGERY | Facility: CLINIC | Age: 84
DRG: 470 | End: 2019-08-27
Payer: COMMERCIAL

## 2019-08-27 ENCOUNTER — ANESTHESIA EVENT (OUTPATIENT)
Dept: SURGERY | Facility: CLINIC | Age: 84
DRG: 470 | End: 2019-08-27
Payer: COMMERCIAL

## 2019-08-27 PROBLEM — S72.002A HIP FRACTURE REQUIRING OPERATIVE REPAIR, LEFT, CLOSED, INITIAL ENCOUNTER (H): Status: ACTIVE | Noted: 2019-08-27

## 2019-08-27 LAB
ABO + RH BLD: NORMAL
ABO + RH BLD: NORMAL
BLD GP AB SCN SERPL QL: NORMAL
BLOOD BANK CMNT PATIENT-IMP: NORMAL
ERYTHROCYTE [DISTWIDTH] IN BLOOD BY AUTOMATED COUNT: 14.3 % (ref 10–15)
HCT VFR BLD AUTO: 36.8 % (ref 35–47)
HGB BLD-MCNC: 11.9 G/DL (ref 11.7–15.7)
MCH RBC QN AUTO: 32.9 PG (ref 26.5–33)
MCHC RBC AUTO-ENTMCNC: 32.3 G/DL (ref 31.5–36.5)
MCV RBC AUTO: 102 FL (ref 78–100)
PLATELET # BLD AUTO: 357 10E9/L (ref 150–450)
RBC # BLD AUTO: 3.62 10E12/L (ref 3.8–5.2)
SPECIMEN EXP DATE BLD: NORMAL
WBC # BLD AUTO: 12.1 10E9/L (ref 4–11)

## 2019-08-27 PROCEDURE — 25000125 ZZHC RX 250: Performed by: ORTHOPAEDIC SURGERY

## 2019-08-27 PROCEDURE — 85027 COMPLETE CBC AUTOMATED: CPT | Performed by: INTERNAL MEDICINE

## 2019-08-27 PROCEDURE — 25000125 ZZHC RX 250: Performed by: NURSE ANESTHETIST, CERTIFIED REGISTERED

## 2019-08-27 PROCEDURE — 40000985 XR PELVIS AND HIP PORTABLE LEFT 1 VIEW

## 2019-08-27 PROCEDURE — 0QP704Z REMOVAL OF INTERNAL FIXATION DEVICE FROM LEFT UPPER FEMUR, OPEN APPROACH: ICD-10-PCS | Performed by: ORTHOPAEDIC SURGERY

## 2019-08-27 PROCEDURE — 36000093 ZZH SURGERY LEVEL 4 1ST 30 MIN: Performed by: ORTHOPAEDIC SURGERY

## 2019-08-27 PROCEDURE — 0SRS019 REPLACEMENT OF LEFT HIP JOINT, FEMORAL SURFACE WITH METAL SYNTHETIC SUBSTITUTE, CEMENTED, OPEN APPROACH: ICD-10-PCS | Performed by: ORTHOPAEDIC SURGERY

## 2019-08-27 PROCEDURE — 86901 BLOOD TYPING SEROLOGIC RH(D): CPT | Performed by: PHYSICIAN ASSISTANT

## 2019-08-27 PROCEDURE — 37000008 ZZH ANESTHESIA TECHNICAL FEE, 1ST 30 MIN: Performed by: ORTHOPAEDIC SURGERY

## 2019-08-27 PROCEDURE — 86850 RBC ANTIBODY SCREEN: CPT | Performed by: PHYSICIAN ASSISTANT

## 2019-08-27 PROCEDURE — 25000132 ZZH RX MED GY IP 250 OP 250 PS 637: Performed by: INTERNAL MEDICINE

## 2019-08-27 PROCEDURE — 27210794 ZZH OR GENERAL SUPPLY STERILE: Performed by: ORTHOPAEDIC SURGERY

## 2019-08-27 PROCEDURE — 25000132 ZZH RX MED GY IP 250 OP 250 PS 637: Performed by: PHYSICIAN ASSISTANT

## 2019-08-27 PROCEDURE — 40000170 ZZH STATISTIC PRE-PROCEDURE ASSESSMENT II: Performed by: ORTHOPAEDIC SURGERY

## 2019-08-27 PROCEDURE — 37000009 ZZH ANESTHESIA TECHNICAL FEE, EACH ADDTL 15 MIN: Performed by: ORTHOPAEDIC SURGERY

## 2019-08-27 PROCEDURE — 36000063 ZZH SURGERY LEVEL 4 EA 15 ADDTL MIN: Performed by: ORTHOPAEDIC SURGERY

## 2019-08-27 PROCEDURE — 86900 BLOOD TYPING SEROLOGIC ABO: CPT | Performed by: PHYSICIAN ASSISTANT

## 2019-08-27 PROCEDURE — 25800030 ZZH RX IP 258 OP 636: Performed by: ANESTHESIOLOGY

## 2019-08-27 PROCEDURE — 25000128 H RX IP 250 OP 636: Performed by: PHYSICIAN ASSISTANT

## 2019-08-27 PROCEDURE — 25000128 H RX IP 250 OP 636: Performed by: NURSE ANESTHETIST, CERTIFIED REGISTERED

## 2019-08-27 PROCEDURE — 99207 ZZC CDG-CODE CATEGORY CHANGED: CPT | Performed by: HOSPITALIST

## 2019-08-27 PROCEDURE — 25000128 H RX IP 250 OP 636: Performed by: ORTHOPAEDIC SURGERY

## 2019-08-27 PROCEDURE — 27810169 ZZH OR IMPLANT GENERAL: Performed by: ORTHOPAEDIC SURGERY

## 2019-08-27 PROCEDURE — 25000128 H RX IP 250 OP 636: Performed by: INTERNAL MEDICINE

## 2019-08-27 PROCEDURE — 99232 SBSQ HOSP IP/OBS MODERATE 35: CPT | Performed by: HOSPITALIST

## 2019-08-27 PROCEDURE — 12000000 ZZH R&B MED SURG/OB

## 2019-08-27 PROCEDURE — 25800025 ZZH RX 258: Performed by: ORTHOPAEDIC SURGERY

## 2019-08-27 PROCEDURE — 25800030 ZZH RX IP 258 OP 636: Performed by: NURSE ANESTHETIST, CERTIFIED REGISTERED

## 2019-08-27 PROCEDURE — 25000131 ZZH RX MED GY IP 250 OP 636 PS 637: Performed by: HOSPITALIST

## 2019-08-27 PROCEDURE — 25800030 ZZH RX IP 258 OP 636: Performed by: INTERNAL MEDICINE

## 2019-08-27 PROCEDURE — 25000132 ZZH RX MED GY IP 250 OP 250 PS 637: Performed by: ORTHOPAEDIC SURGERY

## 2019-08-27 PROCEDURE — C1776 JOINT DEVICE (IMPLANTABLE): HCPCS | Performed by: ORTHOPAEDIC SURGERY

## 2019-08-27 PROCEDURE — 36415 COLL VENOUS BLD VENIPUNCTURE: CPT | Performed by: INTERNAL MEDICINE

## 2019-08-27 PROCEDURE — 71000012 ZZH RECOVERY PHASE 1 LEVEL 1 FIRST HR: Performed by: ORTHOPAEDIC SURGERY

## 2019-08-27 PROCEDURE — 25800030 ZZH RX IP 258 OP 636: Performed by: PHYSICIAN ASSISTANT

## 2019-08-27 DEVICE — BONE CEMENT SIMPLEX FULL DOSE 6191-1-001: Type: IMPLANTABLE DEVICE | Site: FEMUR | Status: FUNCTIONAL

## 2019-08-27 DEVICE — IMPLANTABLE DEVICE: Type: IMPLANTABLE DEVICE | Site: FEMUR | Status: FUNCTIONAL

## 2019-08-27 DEVICE — IMPLANTABLE DEVICE: Type: IMPLANTABLE DEVICE | Site: HIP | Status: FUNCTIONAL

## 2019-08-27 DEVICE — BONE CEMENT RESTRICTOR BUCK FEMORAL 25MM 129419: Type: IMPLANTABLE DEVICE | Site: FEMUR | Status: FUNCTIONAL

## 2019-08-27 RX ORDER — BUPIVACAINE HYDROCHLORIDE 7.5 MG/ML
INJECTION, SOLUTION INTRASPINAL PRN
Status: DISCONTINUED | OUTPATIENT
Start: 2019-08-27 | End: 2019-08-27

## 2019-08-27 RX ORDER — HYDROXYZINE HYDROCHLORIDE 25 MG/1
50 TABLET, FILM COATED ORAL EVERY 6 HOURS PRN
Status: DISCONTINUED | OUTPATIENT
Start: 2019-08-27 | End: 2019-08-30 | Stop reason: HOSPADM

## 2019-08-27 RX ORDER — CEFAZOLIN SODIUM 1 G/3ML
1 INJECTION, POWDER, FOR SOLUTION INTRAMUSCULAR; INTRAVENOUS EVERY 8 HOURS
Status: COMPLETED | OUTPATIENT
Start: 2019-08-28 | End: 2019-08-28

## 2019-08-27 RX ORDER — ONDANSETRON 2 MG/ML
4 INJECTION INTRAMUSCULAR; INTRAVENOUS EVERY 30 MIN PRN
Status: DISCONTINUED | OUTPATIENT
Start: 2019-08-27 | End: 2019-08-27 | Stop reason: HOSPADM

## 2019-08-27 RX ORDER — ONDANSETRON 4 MG/1
4 TABLET, ORALLY DISINTEGRATING ORAL EVERY 30 MIN PRN
Status: DISCONTINUED | OUTPATIENT
Start: 2019-08-27 | End: 2019-08-27 | Stop reason: HOSPADM

## 2019-08-27 RX ORDER — SODIUM CHLORIDE, SODIUM LACTATE, POTASSIUM CHLORIDE, CALCIUM CHLORIDE 600; 310; 30; 20 MG/100ML; MG/100ML; MG/100ML; MG/100ML
INJECTION, SOLUTION INTRAVENOUS CONTINUOUS
Status: DISCONTINUED | OUTPATIENT
Start: 2019-08-27 | End: 2019-08-27 | Stop reason: HOSPADM

## 2019-08-27 RX ORDER — BUPIVACAINE HYDROCHLORIDE 5 MG/ML
INJECTION, SOLUTION PERINEURAL PRN
Status: DISCONTINUED | OUTPATIENT
Start: 2019-08-27 | End: 2019-08-27 | Stop reason: HOSPADM

## 2019-08-27 RX ORDER — PREDNISONE 20 MG/1
20 TABLET ORAL DAILY
Status: DISCONTINUED | OUTPATIENT
Start: 2019-08-27 | End: 2019-08-28

## 2019-08-27 RX ORDER — CEFAZOLIN SODIUM 1 G/3ML
1 INJECTION, POWDER, FOR SOLUTION INTRAMUSCULAR; INTRAVENOUS SEE ADMIN INSTRUCTIONS
Status: DISCONTINUED | OUTPATIENT
Start: 2019-08-27 | End: 2019-08-27 | Stop reason: HOSPADM

## 2019-08-27 RX ORDER — LIDOCAINE 40 MG/G
CREAM TOPICAL
Status: DISCONTINUED | OUTPATIENT
Start: 2019-08-27 | End: 2019-08-30 | Stop reason: HOSPADM

## 2019-08-27 RX ORDER — CEFAZOLIN SODIUM 2 G/100ML
2 INJECTION, SOLUTION INTRAVENOUS
Status: COMPLETED | OUTPATIENT
Start: 2019-08-27 | End: 2019-08-27

## 2019-08-27 RX ORDER — FENTANYL CITRATE 50 UG/ML
25-50 INJECTION, SOLUTION INTRAMUSCULAR; INTRAVENOUS
Status: DISCONTINUED | OUTPATIENT
Start: 2019-08-27 | End: 2019-08-27 | Stop reason: HOSPADM

## 2019-08-27 RX ORDER — HYDROMORPHONE HYDROCHLORIDE 1 MG/ML
.3-.5 INJECTION, SOLUTION INTRAMUSCULAR; INTRAVENOUS; SUBCUTANEOUS EVERY 5 MIN PRN
Status: DISCONTINUED | OUTPATIENT
Start: 2019-08-27 | End: 2019-08-27 | Stop reason: HOSPADM

## 2019-08-27 RX ORDER — NALOXONE HYDROCHLORIDE 0.4 MG/ML
.1-.4 INJECTION, SOLUTION INTRAMUSCULAR; INTRAVENOUS; SUBCUTANEOUS
Status: ACTIVE | OUTPATIENT
Start: 2019-08-27 | End: 2019-08-28

## 2019-08-27 RX ORDER — EPHEDRINE SULFATE 50 MG/ML
INJECTION, SOLUTION INTRAMUSCULAR; INTRAVENOUS; SUBCUTANEOUS PRN
Status: DISCONTINUED | OUTPATIENT
Start: 2019-08-27 | End: 2019-08-27

## 2019-08-27 RX ORDER — MAGNESIUM HYDROXIDE 1200 MG/15ML
LIQUID ORAL PRN
Status: DISCONTINUED | OUTPATIENT
Start: 2019-08-27 | End: 2019-08-27 | Stop reason: HOSPADM

## 2019-08-27 RX ORDER — BISMUTH SUBSALICYLATE 262 MG/1
2 TABLET, CHEWABLE ORAL 4 TIMES DAILY PRN
Status: DISCONTINUED | OUTPATIENT
Start: 2019-08-27 | End: 2019-08-30 | Stop reason: HOSPADM

## 2019-08-27 RX ORDER — HYDROXYZINE HYDROCHLORIDE 25 MG/1
25 TABLET, FILM COATED ORAL EVERY 6 HOURS PRN
Status: DISCONTINUED | OUTPATIENT
Start: 2019-08-27 | End: 2019-08-30 | Stop reason: HOSPADM

## 2019-08-27 RX ORDER — PROPOFOL 10 MG/ML
INJECTION, EMULSION INTRAVENOUS CONTINUOUS PRN
Status: DISCONTINUED | OUTPATIENT
Start: 2019-08-27 | End: 2019-08-27

## 2019-08-27 RX ADMIN — MORPHINE SULFATE 1 MG: 2 INJECTION, SOLUTION INTRAMUSCULAR; INTRAVENOUS at 21:52

## 2019-08-27 RX ADMIN — SODIUM CHLORIDE: 9 INJECTION, SOLUTION INTRAVENOUS at 00:08

## 2019-08-27 RX ADMIN — SODIUM CHLORIDE: 9 INJECTION, SOLUTION INTRAVENOUS at 13:11

## 2019-08-27 RX ADMIN — DEXMEDETOMIDINE HYDROCHLORIDE 8 MCG: 100 INJECTION, SOLUTION INTRAVENOUS at 15:54

## 2019-08-27 RX ADMIN — SERTRALINE HYDROCHLORIDE 50 MG: 50 TABLET ORAL at 21:52

## 2019-08-27 RX ADMIN — SERTRALINE HYDROCHLORIDE 50 MG: 50 TABLET ORAL at 00:07

## 2019-08-27 RX ADMIN — MELATONIN 3 MG: 3 TAB ORAL at 21:52

## 2019-08-27 RX ADMIN — SODIUM CHLORIDE: 9 INJECTION, SOLUTION INTRAVENOUS at 22:02

## 2019-08-27 RX ADMIN — PHENYLEPHRINE HYDROCHLORIDE 100 MCG: 10 INJECTION INTRAVENOUS at 17:18

## 2019-08-27 RX ADMIN — ACETAMINOPHEN 650 MG: 325 TABLET ORAL at 22:48

## 2019-08-27 RX ADMIN — RANITIDINE 150 MG: 150 TABLET ORAL at 00:08

## 2019-08-27 RX ADMIN — OMEPRAZOLE 20 MG: 20 CAPSULE, DELAYED RELEASE ORAL at 08:57

## 2019-08-27 RX ADMIN — DEXMEDETOMIDINE HYDROCHLORIDE 12 MCG: 100 INJECTION, SOLUTION INTRAVENOUS at 15:51

## 2019-08-27 RX ADMIN — PHENYLEPHRINE HYDROCHLORIDE 0.25 MCG/KG/MIN: 10 INJECTION INTRAVENOUS at 15:54

## 2019-08-27 RX ADMIN — PROPOFOL 30 MCG/KG/MIN: 10 INJECTION, EMULSION INTRAVENOUS at 15:51

## 2019-08-27 RX ADMIN — SODIUM CHLORIDE, POTASSIUM CHLORIDE, SODIUM LACTATE AND CALCIUM CHLORIDE: 600; 310; 30; 20 INJECTION, SOLUTION INTRAVENOUS at 14:50

## 2019-08-27 RX ADMIN — OXYCODONE HYDROCHLORIDE 5 MG: 5 TABLET ORAL at 21:00

## 2019-08-27 RX ADMIN — PHENYLEPHRINE HYDROCHLORIDE 100 MCG: 10 INJECTION INTRAVENOUS at 16:19

## 2019-08-27 RX ADMIN — MELATONIN 3 MG: 3 TAB ORAL at 00:08

## 2019-08-27 RX ADMIN — Medication 5 MG: at 16:34

## 2019-08-27 RX ADMIN — FLUTICASONE PROPIONATE 2 SPRAY: 50 SPRAY, METERED NASAL at 13:11

## 2019-08-27 RX ADMIN — LEVOTHYROXINE SODIUM 88 MCG: 88 TABLET ORAL at 08:57

## 2019-08-27 RX ADMIN — PREDNISONE 20 MG: 20 TABLET ORAL at 13:11

## 2019-08-27 RX ADMIN — Medication 5 MG: at 16:36

## 2019-08-27 RX ADMIN — OXYCODONE HYDROCHLORIDE 2.5 MG: 5 TABLET ORAL at 13:06

## 2019-08-27 RX ADMIN — RANITIDINE 150 MG: 150 TABLET ORAL at 20:55

## 2019-08-27 RX ADMIN — RANITIDINE 150 MG: 150 TABLET ORAL at 08:57

## 2019-08-27 RX ADMIN — BUPIVACAINE HYDROCHLORIDE IN DEXTROSE 12 MG: 7.5 INJECTION, SOLUTION SUBARACHNOID at 16:14

## 2019-08-27 RX ADMIN — CEFAZOLIN SODIUM 2 G: 2 INJECTION, SOLUTION INTRAVENOUS at 16:17

## 2019-08-27 RX ADMIN — HYDROXYZINE HYDROCHLORIDE 25 MG: 25 TABLET ORAL at 22:48

## 2019-08-27 RX ADMIN — SODIUM CHLORIDE, POTASSIUM CHLORIDE, SODIUM LACTATE AND CALCIUM CHLORIDE: 600; 310; 30; 20 INJECTION, SOLUTION INTRAVENOUS at 16:48

## 2019-08-27 RX ADMIN — OXYCODONE HYDROCHLORIDE 2.5 MG: 5 TABLET ORAL at 00:08

## 2019-08-27 RX ADMIN — OXYCODONE HYDROCHLORIDE 2.5 MG: 5 TABLET ORAL at 06:55

## 2019-08-27 ASSESSMENT — ACTIVITIES OF DAILY LIVING (ADL)
BATHING: 2-->ASSISTIVE PERSON
AMBULATION: 3-->ASSISTIVE EQUIPMENT AND PERSON
TOILETING: 2-->ASSISTIVE PERSON
NUMBER_OF_TIMES_PATIENT_HAS_FALLEN_WITHIN_LAST_SIX_MONTHS: 1
ADLS_ACUITY_SCORE: 30
ADLS_ACUITY_SCORE: 31
DRESS: 2-->ASSISTIVE PERSON
TRANSFERRING: 3-->ASSISTIVE EQUIPMENT AND PERSON
RETIRED_COMMUNICATION: 0-->UNDERSTANDS/COMMUNICATES WITHOUT DIFFICULTY
SWALLOWING: 0-->SWALLOWS FOODS/LIQUIDS WITHOUT DIFFICULTY
ADLS_ACUITY_SCORE: 26
COGNITION: 1 - ATTENTION OR MEMORY DEFICITS
ADLS_ACUITY_SCORE: 30
WHICH_OF_THE_ABOVE_FUNCTIONAL_RISKS_HAD_A_RECENT_ONSET_OR_CHANGE?: AMBULATION;TRANSFERRING
FALL_HISTORY_WITHIN_LAST_SIX_MONTHS: YES
RETIRED_EATING: 0-->INDEPENDENT
ADLS_ACUITY_SCORE: 30

## 2019-08-27 ASSESSMENT — COPD QUESTIONNAIRES: COPD: 0

## 2019-08-27 ASSESSMENT — LIFESTYLE VARIABLES: TOBACCO_USE: 0

## 2019-08-27 ASSESSMENT — ENCOUNTER SYMPTOMS: SEIZURES: 0

## 2019-08-27 NOTE — ED NOTES
Bed: ED29  Expected date: 8/26/19  Expected time: 7:49 PM  Means of arrival:   Comments:  439  89F  Post surgical pain  1945

## 2019-08-27 NOTE — ED NOTES
"[facility]  ED Nurse Handoff Report    ED Chief complaint: Leg Pain      ED Diagnosis:   Final diagnoses:   Hip fracture, left, closed, initial encounter (H)       Code Status: see admitting MD    Allergies:   Allergies   Allergen Reactions     Zolpidem Other (See Comments)     Confusion even with low dose.       Activity level - Baseline/Home: stand with assist   Activity Level - Current:   Total care    Patient's Preferred language: English   Needed?: No    Isolation: No  Infection: Not Applicable  Bariatric?: No    Vital Signs:   Vitals:    08/26/19 2001   BP: (!) 158/76   Pulse: 83   Resp: 16   Temp: 98.8  F (37.1  C)   TempSrc: Oral   SpO2: 95%   Weight: 52.2 kg (115 lb)   Height: 1.651 m (5' 5\")       Cardiac Rhythm: ,        Pain level: 0-10 Pain Scale: 3    Is this patient confused?: No   Does this patient have a guardian?  No         If yes, is there guardianship documents in the Epic \"Code/ACP\" activity?  N/A         Guardian Notified?  N/A  [place] - Suicide Severity Rating Scale Completed?  Yes  If yes, what color did the patient score?  White    Patient Report: Initial Complaint: pt had surgery on her left closed hip fracture from June 30th 2019. Pt reports its been very difficult to ambulate the past few days and finally tonight she was unable to ambulate at all.  Focused Assessment: pt is not a very good historian. Pt is unable to lift left leg off the bed. Left hip fracture  Tests Performed: chest xray, left hip xray and labs  Abnormal Results:   Abnormal Labs Reviewed   CBC WITH PLATELETS - Abnormal; Notable for the following components:       Result Value    WBC 11.9 (*)     RBC Count 3.75 (*)      (*)     MCH 33.9 (*)     All other components within normal limits   BASIC METABOLIC PANEL - Abnormal; Notable for the following components:    Carbon Dioxide 33 (*)     Anion Gap 2 (*)     Creatinine 0.50 (*)     All other components within normal limits "     Treatments provided: 2.5 mg oxycodone    Family Comments: two adult son's at bedside    OBS brochure/video discussed/provided to patient/family: No              Name of person given brochure if not patient: na              Relationship to patient: na    ED Medications:   Medications   oxyCODONE IR (ROXICODONE) half-tab 2.5 mg (2.5 mg Oral Given 8/26/19 2107)       Drips infusing?:  No    For the majority of the shift this patient was Green.   Interventions performed were none.    Severe Sepsis OR Septic Shock Diagnosis Present: No    To be done/followed up on inpatient unit:  nothing noted at this time    ED NURSE PHONE NUMBER: [id number]

## 2019-08-27 NOTE — PHARMACY-ADMISSION MEDICATION HISTORY
Admission medication history interview status for the 8/26/2019  admission is complete. See EPIC admission navigator for prior to admission medications     Medication history source reliability:Good    Actions taken by pharmacist (provider contacted, etc):None     Additional medication history information not noted on PTA med list :None    Medication reconciliation/reorder completed by provider prior to medication history? Yes    Time spent in this activity: 15 minutes     Prior to Admission medications    Medication Sig Last Dose Taking? Auth Provider   azaTHIOprine (IMURAN) 50 MG tablet Take 150 mg by mouth daily 8/26/2019 at am Yes Reported, Patient   calcium carbonate 500 mg, elemental, (OSCAL;OYSTER SHELL CALCIUM) 500 MG tablet Take 500 mg by mouth 2 times daily On tuesdays 8/26/2019 at am Yes Unknown, Entered By History   ergocalciferol (ERGOCALCIFEROL) 48164 units capsule Take 50,000 Units by mouth once a week Every Tuesday 8/20/2019 at am Yes Unknown, Entered By History   fluticasone (FLONASE) 50 MCG/ACT nasal spray Spray 2 sprays into both nostrils daily 8/26/2019 at am Yes Unknown, Entered By History   levothyroxine (SYNTHROID/LEVOTHROID) 88 MCG tablet Take 88 mcg by mouth daily  8/26/2019 at am Yes Reported, Patient   melatonin 3 MG tablet Take 3 mg by mouth At Bedtime  8/25/2019 at pm Yes Reported, Patient   predniSONE (DELTASONE) 5 MG tablet Take 20 mg by mouth daily 8/26/2019 at am Yes Unknown, Entered By History   senna (SENOKOT) 8.6 MG tablet Take 1 tablet by mouth daily as needed for constipation prn at prn Yes Unknown, Entered By History   sertraline (ZOLOFT) 50 MG tablet Take 50 mg by mouth At Bedtime  8/25/2019 at pm Yes Reported, Patient   acetaminophen (TYLENOL) 325 MG tablet Take 2 tablets (650 mg) by mouth every 4 hours as needed for mild pain prn at prn  Chandrika Kan PA-C   albuterol (PROAIR HFA/PROVENTIL HFA/VENTOLIN HFA) 108 (90 BASE) MCG/ACT Inhaler Inhale 2 puffs into the lungs  every 4 hours as needed  prn at prn  Reported, Patient   bismuth subsalicylate (PEPTO-BISMOL) 262 MG chewable tablet Take 2 tablets by mouth every hour as needed (max 8 tablets/24 hours) prn at prn  Unknown, Entered By History

## 2019-08-27 NOTE — PLAN OF CARE
Patient is currently in PACU.  She left [facility] for Pre-op around 2pm.  A&OX4.  Strict bedrest.  Surgical prep done.  Oxycodone for pain.  Incontinent of urine.  Bilateral heels dark red but blanchable and elbows red and blanchable.  Pillows used to off-load heels and cushion elbows.

## 2019-08-27 NOTE — ANESTHESIA PREPROCEDURE EVALUATION
Anesthesia Pre-Procedure Evaluation    Patient: Rayne Mabry   MRN: [id number] : 1930          Preoperative Diagnosis: LEFT HIP FRACTURE    Procedure(s):  LEFT HIP YUDITH ARTHROPLASTY  BIOMET BIPOLAR, WITH PROXIMAL FEMUR HARDWARE REMOVAL(SYNTHES [facility] SCREW SET.)    Past Medical History:   Diagnosis Date     Acute CHF (H)      Anxiety      Arthritis     rheumatoid arthritis     Chronic anemia      Closed left hip fracture (H)      Depression      Fall 2019    Mechanical fall with left hip fracture - s/p percutaneous fixation      GERD (gastroesophageal reflux disease)      Hypoxia      Hypoxia, suspect atelectasis related to recent surgery.     MAC (mycobacterium avium-intracellulare complex)      Mild cognitive disorder      Polyarteritis nodosa (H)      Polyneuropathy      Thyroid disease     Hypothyroidism.       UTI (urinary tract infection) 2019    RECENT     Past Surgical History:   Procedure Laterality Date     CLOSED REDUCTION, PERCUTANEOUS PINNING HIP Left 2019    Procedure: PERCUTANEOUS FIXATION OF LEFT HIP WITH SYNTHES FEMORAL NECK SYSTEM;  Surgeon: Axel Curtis MD;  Location: [facility] OR     GYN SURGERY      hyster     HIP SURGERY Right      HYSTERECTOMY       LEFT HIP FX       s/p percutaneous fixation   2019       Anesthesia Evaluation     . Pt has had prior anesthetic. Type: General    History of anesthetic complications    Delirium      ROS/MED HX    ENT/Pulmonary:      (-) tobacco use, asthma, COPD and sleep apnea   Neurologic:     (+)dementia,    (-) seizures, CVA and TIA   Cardiovascular:     (+) ----. : . CHF . . :. .      (-) hypertension, CAD, pacemaker, pacemaker and ICD   METS/Exercise Tolerance:     Hematologic: Comments: Polyarteritis nodosa - neg hematologic  ROS       Musculoskeletal: Comment: Polyarteritis nodosa  Polyneuropathy  (+) arthritis,  -       GI/Hepatic:     (+) GERD      (-) liver disease   Renal/Genitourinary:      (-) renal disease  "  Endo:     (+) thyroid problem hypothyroidism, .   (-) Type I DM and Type II DM   Psychiatric:     (+) psychiatric history depression      Infectious Disease:         Malignancy:         Other:                          Physical Exam      Airway     Dental   (+) missing and chipped    Cardiovascular       Pulmonary             Lab Results   Component Value Date    WBC 12.1 (H) 08/27/2019    HGB 11.9 08/27/2019    HCT 36.8 08/27/2019     08/27/2019    CRP 18.5 (H) 05/23/2011    SED 31 (H) 05/23/2011     08/26/2019    POTASSIUM 4.1 08/26/2019    CHLORIDE 105 08/26/2019    CO2 33 (H) 08/26/2019    BUN 15 08/26/2019    CR 0.50 (L) 08/26/2019    GLC 98 08/26/2019    MICHELLE 10.0 08/26/2019    ALBUMIN 2.9 (L) 05/15/2011    PROTTOTAL 5.9 (L) 05/15/2011    ALT 36 05/15/2011    AST 74 (H) 05/15/2011    ALKPHOS 91 05/15/2011    BILITOTAL 0.3 05/15/2011    PTT 27 01/13/2006    INR 1.00 12/11/2014    TSH 5.27 (H) 09/18/2017    T4 0.95 05/15/2011       Preop Vitals  BP Readings from Last 3 Encounters:   08/27/19 (!) 153/79   07/12/19 100/61   07/09/19 116/74    Pulse Readings from Last 3 Encounters:   08/27/19 79   07/12/19 78   07/09/19 80      Resp Readings from Last 3 Encounters:   08/27/19 14   07/12/19 18   07/09/19 17    SpO2 Readings from Last 3 Encounters:   08/27/19 91%   07/12/19 96%   07/09/19 94%      Temp Readings from Last 1 Encounters:   08/27/19 37.4  C (99.3  F) (Tympanic)    Ht Readings from Last 1 Encounters:   08/26/19 1.651 m (5' 5\")      Wt Readings from Last 1 Encounters:   08/26/19 52.2 kg (115 lb)    Estimated body mass index is 19.14 kg/m  as calculated from the following:    Height as of this encounter: 1.651 m (5' 5\").    Weight as of this encounter: 52.2 kg (115 lb).       Anesthesia Plan      History & Physical Review  History and physical reviewed and following examination; no interval change.    ASA Status:  3 .    NPO Status:  > 8 hours    Plan for Spinal          Postoperative " Care      Consents  Anesthetic plan, risks, benefits and alternatives discussed with:  Patient.  Use of blood products discussed: Yes.   Use of blood products discussed with Patient.  .                 Shaggy George, DO

## 2019-08-27 NOTE — H&P
"[facility]    History and Physical  Hospitalist       Date of Admission:  8/26/2019  Date of Service (when I saw the patient): 08/26/19    Assessment & Plan   Rayne Mabry is a 89 year old female who presents with a h/o recent L hip fracture 6/2019, hypothyroidism, PAN on imuran, polyneuropathy, GERD, BILLIE complex infection and artrhitis    L hip hardware failure  S/p \"percutaneous fixation of the left fix with synthes of the femoral neck for a closed displaced fracture of the hip\" 7/1/2019. Initially did well post procedure, but then in the last month or so started to decline. States day of presentation minimally able to walk. Stable vitals on arrival in the ED. Exam largely unremarkable, hip motion not examined. WBC sl elevated at 11.9. Xray pelvis with L hip hardware failure.   - orthopedic surgery consult  - NPO, IV fluids for now  - prn acetaminophen, prn oxycodone, prn IV morphine for breakthrough    Leukocytosis  WBC at 11.9 on admission. Afebrile, vitals stable. No obvious infection in area of L hip  - repeat CBC in the am    Polyarteritis nodosa  Polyneuropathy  Outpatient on imuran 150 mg daily for this. Appears to have been started on prednisone 20 mg daily on 8/13 2/2 concern that L hip pain was related to PAN (per pt)  - hold imuran for now pending surgery plans  - given fewer than 3 weeks on prednisone and plans for surgery, will hold steroids     Hypothyroidism  Followed by outpatient primary provider, Dr. Hanley  - Continue prior to admission levothyroxine 88 mcg daily    GERD  Continue prior to admission omeprazole 20 mg daily and ranitidine 150 mg BID    Depression  Continue prior to admission sertraline.    DVT Prophylaxis: Pneumatic Compression Devices  Code Status: DNR / DNI    Disposition: Expected discharge in 2+ days pending orthopedic surgery plans    Avni Kulkarni MD  [phone] (P)  Text Page     Primary Care Physician   Susan Hanley    Chief Complaint "   L hip pain    History is obtained from the patient and medical records    History of Present Illness   Rayne Mabry is a 89 year old female who presents with left hip pain.  She has a history of polyarteritis nodosa, polyneuropathy, gastroesophageal reflux disease, hypothyroidism, depression/anxiety, mild cognitive dysfunction.  She was hospitalized in late June/early July 2019 after having a left hip fracture.  She underwent repair on July 1 and was [facility]  She was there for roughly 2 weeks and was doing well at the time of discharge.  She continued to do well but then plateaued in her therapies and actually started to worsen.  She started having increased pain in her left hip.  Day of presentation she was barely able to stand without significant pain prompting emergency department visit.  At the time of visit she denied any fever fevers or chills.  Denied chest pain or shortness of breath.  She does not have a history of frequent falls.  She denies dizziness lightheadedness.  No chest pain or shortness of breath.  She has been eating okay.  Denies any urine symptoms.    In the emergency department blood pressure was 150/70.  Heart rate is in the 80s.  She is afebrile.  O2 sats are 95% on room air.  Basic metabolic panel was normal.  CBC was remarkable for hemoglobin 11.9.  X-ray of her pelvis and left hip show interval hardware failure/re-fracture with superior displacement of the distal fracture fragment.    Past Medical History    I have reviewed this patient's medical history and updated it with pertinent information if needed.   Past Medical History:   Diagnosis Date     Acute CHF (H)      Anxiety      Arthritis     rheumatoid arthritis     Chronic anemia      Closed left hip fracture (H)      Depression      Fall 07/01/2019    Mechanical fall with left hip fracture - s/p percutaneous fixation      GERD (gastroesophageal reflux disease)      Hypoxia      Hypoxia, suspect atelectasis  related to recent surgery.     MAC (mycobacterium avium-intracellulare complex)      Mild cognitive disorder      Polyarteritis nodosa (H)      Polyneuropathy      Thyroid disease     Hypothyroidism.       UTI (urinary tract infection) 07/04/2019    RECENT       Past Surgical History   I have reviewed this patient's surgical history and updated it with pertinent information if needed.  Past Surgical History:   Procedure Laterality Date     CLOSED REDUCTION, PERCUTANEOUS PINNING HIP Left 7/1/2019    Procedure: PERCUTANEOUS FIXATION OF LEFT HIP WITH SYNTHES FEMORAL NECK SYSTEM;  Surgeon: Axel Curtis MD;  Location: [facility] OR     GYN SURGERY      hyster     HIP SURGERY Right      HYSTERECTOMY       LEFT HIP FX       s/p percutaneous fixation   07/01/2019       Prior to Admission Medications   Prior to Admission Medications   Prescriptions Last Dose Informant Patient Reported? Taking?   acetaminophen (TYLENOL) 325 MG tablet   No No   Sig: Take 2 tablets (650 mg) by mouth every 4 hours as needed for mild pain   albuterol (PROAIR HFA/PROVENTIL HFA/VENTOLIN HFA) 108 (90 BASE) MCG/ACT Inhaler  Nursing Home Yes No   Sig: Inhale 1-2 puffs into the lungs every 4 hours as needed   aspirin (ASA) 325 MG EC tablet   No No   Sig: Take one Aspirin tab twice daily for 5 weeks.   azaTHIOprine (IMURAN) 50 MG tablet  Nursing Home Yes No   Sig: Take 150 mg by mouth daily   bismuth subsalicylate (PEPTO-BISMOL) 262 MG chewable tablet  Nursing Home Yes No   Sig: Take 2 tablets by mouth every hour as needed (max 8 tablets/24 hours)   calcium carbonate 500 mg, elemental, (OSCAL;OYSTER SHELL CALCIUM) 500 MG tablet  Nursing Home Yes No   Sig: Take 500 mg by mouth 2 times daily   ergocalciferol (ERGOCALCIFEROL) 45034 units capsule  Nursing Home Yes No   Sig: Take 50,000 Units by mouth once a week Every Tuesday   levothyroxine (SYNTHROID/LEVOTHROID) 88 MCG tablet  Nursing Home Yes No   Sig: Take 88 mcg by mouth daily    loratadine  "(CLARITIN) 10 MG tablet  Nursing Home Yes No   Sig: Take 10 mg by mouth daily   melatonin 3 MG tablet  Nursing Home Yes No   Sig: Take 3 mg by mouth At Bedtime    omeprazole (PRILOSEC) 20 MG CR capsule  Nursing Home Yes No   Sig: Take 20 mg by mouth daily Do not crush   ondansetron (ZOFRAN-ODT) 4 MG ODT tab   No No   Sig: Take 1 tablet (4 mg) by mouth every 6 hours as needed for nausea or vomiting   oxyCODONE (ROXICODONE) 5 MG tablet   No No   Sig: Take 0.5-1 tablets (2.5-5 mg) by mouth every 4 hours as needed 1/2 tab po q 4 hrs prn pain scale \"1-5\"  1 tab po q 4 hrs prn pain scale \"6-10\"   polyethylene glycol (MIRALAX/GLYCOLAX) packet   Yes No   Sig: Take 1 packet by mouth daily    ranitidine (ZANTAC) 150 MG tablet   No No   Sig: Take 1 tablet (150 mg) by mouth 2 times daily   senna-docusate (SENOKOT-S/PERICOLACE) 8.6-50 MG tablet   No No   Sig: Take 1 tablet by mouth 2 times daily as needed   sertraline (ZOLOFT) 50 MG tablet  Nursing Home Yes No   Sig: Take 50 mg by mouth At Bedtime       Facility-Administered Medications: None     Allergies   Allergies   Allergen Reactions     Zolpidem Other (See Comments)     Confusion even with low dose.       Social History   I have reviewed this patient's social history and updated it with pertinent information if needed. Rayne Mabry  reports that she has never smoked. She has never used smokeless tobacco. She reports that she drinks alcohol.    Family History   I have reviewed this patient's family history and updated it with pertinent information if needed.   Family history reviewed and other than discussed above is noncontributory    Review of Systems   The 10 point Review of Systems is negative other than noted in the HPI or here.     Physical Exam   Temp: 98.8  F (37.1  C) Temp src: Oral BP: (!) 158/76 Pulse: 83   Resp: 16 SpO2: 95 % O2 Device: None (Room air)    Vital Signs with Ranges  115 lbs 0 oz    Constitutional: alert, oriented and in no acute " distress  Eyes: EOMI, PERRL  HEENT: OP clear  Respiratory: CTA B without w/c  Cardiovascular: RRR without m/r/g  GI: soft, nontender, nondistended, no HSM  Lymph/Hematologic: no cervical LAD  Genitourinary: deferred  Skin: no rashes or lesions grossly  Musculoskeletal: no deformities or arthritis. No evidence of erythema or joint deformity over L hip area.  Neurologic: CN II-XII, REN, sensation grossly intact  Psychiatric: mood and affect wnl    Data   Data reviewed today:  I personally reviewed the chest x-ray image(s) showing clear chest and the L hip image(s) showing fracture/ hardware failure.  Recent Labs   Lab 08/26/19 2011   WBC 11.9*   HGB 12.7   *         POTASSIUM 4.1   CHLORIDE 105   CO2 33*   BUN 15   CR 0.50*   ANIONGAP 2*   MICHELLE 10.0   GLC 98       Recent Results (from the past 24 hour(s))   XR Pelvis and Hip Left 1 View    Narrative    XR PELVIS AND HIP LEFT 1 VIEW   8/26/2019 8:30 PM     HISTORY:  recent surgery, pain    COMPARISON: 7/1/2019      Impression    IMPRESSION: Status post ORIF of the left femoral neck fracture.  Interval hardware failure/re-fracture with superior displacement of  the distal fracture fragment. Normal joint alignment maintained.  Status post right hip bipolar hemiarthroplasty. Osteopenia.  Atherosclerosis.    AJAY WILLIAM MD   XR Chest 1 View    Narrative    XR CHEST ONE VIEW   8/26/2019 8:45 PM     HISTORY: Left hip fracture. Preoperative chest x-ray.    COMPARISON: Chest x-ray 6/30/2019.      Impression    IMPRESSION: Supine view of the chest is performed. Scattered  interstitial changes appear stable compared to prior exam. Underlying  COPD is likely. No new infiltrate or lung consolidation. No  pneumothorax or pleural effusions. Heart is normal in size. Aorta  demonstrates calcified plaque and is tortuous but unchanged.  Compression fracture in the region of the thoracolumbar junction of  the spine is noted. This is unchanged compared to prior  x-ray.    NIMESH CASTRO MD

## 2019-08-27 NOTE — ED PROVIDER NOTES
History     Chief Complaint:  Hip Pain    HPI   Rayne Mabry is a 89 year old female, with a history of acute CHF, chronic anemia, thyroid disease, status-post 7 weeks closed reduction internal fixation of her left hip, who presents to the ED via EMS for evaluation of hip pain following surgery. The patient had a percutaneous fixation of the left fix with synthes of the femoral neck for a closed displaced fracture of the hip on 7/1/2019 and reports increasing pain since the surgery. The patient states that she was able to walk a little bit this morning, albeit slowly, which devolved to not being able to walk at all which prompted her to call EMS and present to the ED. The patient was given 0.5 mg Dilaudid en route to the ED by EMS. The patient also complains of numbness, back pain, left-sided pelvic pain, arthralgias, myalgias, and intermittent nausea. The patient denies any fever or chills.    Allergies:  Zolpidem, confusion     Medications:    Proair HFA  325 mg Aspirin  Imuran  Pepto-Bismol  Calcium Carbonate  Ergocalciferol  Synthroid  Claritin  Melatonin  Prilosec  Zofran  Oxycodone  Miralax  Zantac  Senokot-S  Zoloft    Past Medical History:    Acute CHF  Anxiety  Arthritis  Chronic anemia  Closed left hip fracture  Depression  Fall  GERD  Hypoxia  Mycobacterium avium-intracellular complex  Mild cognitive disorder  Polyarteritis nodosa  Polyneuropathy  Thyroid disease  UTI    Past Surgical History:    Closed reduction, percutaneous pinning hip, left  Hysterectomy    Family History:    No past pertinent family history.    Social History:  Negative for tobacco use.  Positive for alcohol use.   Negative for drug use.  Marital Status:   [5]     Review of Systems   Constitutional: Negative for chills and fever.   Gastrointestinal: Positive for nausea.   Musculoskeletal: Positive for arthralgias, back pain, gait problem and myalgias.   Neurological: Positive for numbness.   All other systems reviewed  "and are negative.    Physical Exam     Patient Vitals for the past 24 hrs:   BP Temp Temp src Pulse Resp SpO2 Height Weight   08/26/19 2001 (!) 158/76 98.8  F (37.1  C) Oral 83 16 95 % 1.651 m (5' 5\") 52.2 kg (115 lb)     Physical Exam  Eyes:  The pupils are equal and round    Conjunctivae and sclerae are normal  ENT:    The nose is normal    Pinnae are normal  Neck:  Normal range of motion    There is no rigidity noted    There is no midline cervical spine tenderness  CV:  Regular rate and rhythm     No edema  Resp:  Lungs are clear    Non-labored    No rales    No wheezing   GI:  Abdomen is soft and non-tender, there is no rigidity    No distension    No rebound tenderness   MS:  Normal muscular tone    No asymmetric leg swelling    Tenderness of the left hip  Skin:  No rash or acute skin lesions noted  Neuro:   Awake, alert.  Poor short-term memory    Speech is normal and fluent.    Face is symmetric.     Moves all extremities    SILT in bilateral legs    Emergency Department Course   Imaging:  Radiographic findings were communicated with the patient who voiced understanding of the findings.  Lumbar spine XR, 2-3 views:  1. Age-indeterminate severe L1 compression fracture.  2. Slight, chronic-appearing concavity of the superior endplates of  L2, L3 and L4.  3. Multilevel degenerative changes of the lumbar spine, most  pronounced at the lower lumbar levels as per radiology.    XR Pelvis and Hip, left, 1 view:   Status post ORIF of the left femoral neck fracture.  Interval hardware failure/re-fracture with superior displacement of  the distal fracture fragment. Normal joint alignment maintained.  Status post right hip bipolar hemiarthroplasty. Osteopenia.  Atherosclerosis as per radiology.    XR Chest 2 views:   Supine view of the chest is performed. Scattered  interstitial changes appear stable compared to prior exam. Underlying  COPD is likely. No new infiltrate or lung consolidation. No  pneumothorax or pleural " effusions. Heart is normal in size. Aorta  demonstrates calcified plaque and is tortuous but unchanged.  Compression fracture in the region of the thoracolumbar junction of  the spine is noted. This is unchanged compared to prior x-ray as per radiology.    Laboratory:  CBC: WBC: 11.9 (H), HGB: 12.7, PLT: 368  BMP: Carbon Dioxide: 33 (H), Anion GAP: 2 (L), Creatinine: 0.50 (L), o/w WNL     Interventions:  2107 Oxycodone half-tablet 2.5 mg PO    Emergency Department Course:  Nursing notes and vitals reviewed. (2154) I performed an exam of the patient as documented above.     2030 I rechecked the patient and spoke with her sons.    IV inserted. Medicine administered as documented above. Blood drawn. This was sent to the lab for further testing, results above.     The patient was sent for a lumbar spine x-ray and pelvis/hip x-ray while in the emergency department, findings above.     Findings and plan explained to the Patient and family who consents to admission.     2123: Discussed the patient with Dr. Avni Kulkarni, who will admit the patient to an inpatient ortho bed for further monitoring, evaluation, and treatment.     Impression & Plan    Medical Decision Making:  Rayne Mabry is a 89 year old female who presents the emergency department with left hip pain.  She has had progressive increasing difficulty ambulating over the past several days.  She is now unable to get up and ambulate.  X-ray of her hip reveals a fracture at her prior surgical site.  X-ray of her lumbar spine does show severe L1 compression fracture as well.  Patient discussed with the hospitalist for admission as orthopedic consultation and likely surgery will be necessary.  Patient does have a mild leukocytosis.  Unclear of exact cause.  She is not febrile and has no other complaints.    Diagnosis:    ICD-10-CM   1. Hip fracture, left, closed, initial encounter (H) S72.002A       Disposition:  The patient was admitted.    Nadja  Disclosure:  I, Hiral Casarez, am serving as a scribe on 8/26/2019 at 7:54 PM to personally document services performed by No att. providers found based on my observations and the provider's statements to me.     Hiral Casarez  8/26/2019    EMERGENCY DEPARTMENT       Darek Koch MD  08/26/19 1357

## 2019-08-27 NOTE — CONSULTS
[facility]    Orthopedic Consultation    Rayne Mabry MRN# [id number]   Age: 89 year old YOB: 1930     Date of Admission: 8/26/2019    Reason for consult: Failed hardware fixation from Left hip ORIF on 7/1/2019       Requesting physician: Avni Kulkarni MD       Level of consult: Consult, follow and place orders           Assessment and Plan:   Assessment:   Failed hardware fixation from Left hip ORIF on 7/1/19   S/P right hip hemiarthroplasty at [facility] 2017      Plan:   Patient scheduled for a Left hip removal of hardware/conversion to Left hip hemiarthroplasty to be performed by Dr. Barger later today, 8/27/19.  Risks and benefits of the procedure are discussed and reviewed with patient and her sons.  They opt to proceed with surgery.   Continue NPO Status  Bedrest until postop  Nonweightbearing Left LE  Pain medication as needed, minimize narcotics as able  X-rays and treatment plan are reviewed and agreed upon by Dr. Barger             Chief Complaint:   Left hip pain         History of Present Illness:   Rayne Mabry is a 89 year old female who presents with left hip pain.  She has a history of polyarteritis nodosa, polyneuropathy, gastroesophageal reflux disease, hypothyroidism, depression/anxiety, mild cognitive dysfunction.  She was hospitalized in late June/early July 2019 after having a left hip fracture.  She underwent repair on July 1, 2019 by Dr THOMAS Curtis and was [facility].  She was there for roughly 2 weeks and was doing well at the time of discharge.  She continued to do well but then plateaued in her therapies and actually started to worsen.  She started having increased pain in her left hip.  Day of presentation she was barely able to stand without significant pain prompting emergency department visit.   XRays in ED revealed failed hardware fixation and fracture displacement.           Past Medical History:     Past Medical History:    Diagnosis Date     Acute CHF (H)      Anxiety      Arthritis     rheumatoid arthritis     Chronic anemia      Closed left hip fracture (H)      Depression      Fall 07/01/2019    Mechanical fall with left hip fracture - s/p percutaneous fixation      GERD (gastroesophageal reflux disease)      Hypoxia      Hypoxia, suspect atelectasis related to recent surgery.     MAC (mycobacterium avium-intracellulare complex)      Mild cognitive disorder      Polyarteritis nodosa (H)      Polyneuropathy      Thyroid disease     Hypothyroidism.       UTI (urinary tract infection) 07/04/2019    RECENT             Past Surgical History:     Past Surgical History:   Procedure Laterality Date     CLOSED REDUCTION, PERCUTANEOUS PINNING HIP Left 7/1/2019    Procedure: PERCUTANEOUS FIXATION OF LEFT HIP WITH SYNTHES FEMORAL NECK SYSTEM;  Surgeon: Axel Curtis MD;  Location: [facility] OR     GYN SURGERY      hyster     HIP SURGERY Right      HYSTERECTOMY       LEFT HIP FX       s/p percutaneous fixation   07/01/2019             Social History:     Social History     Tobacco Use     Smoking status: Never Smoker     Smokeless tobacco: Never Used   Substance Use Topics     Alcohol use: Yes     Comment: 1 glass wine a night             Family History:   History reviewed. No pertinent family history.          Immunizations:     VACCINE/DOSE   Diptheria   DPT   DTAP   HBIG   Hepatitis A   Hepatitis B   HIB   Influenza   Measles   Meningococcal   MMR   Mumps   Pneumococcal   Polio   Rubella   Small Pox   TDAP   Varicella   Zoster             Allergies:     Allergies   Allergen Reactions     Zolpidem Other (See Comments)     Confusion even with low dose.             Medications:     Current Facility-Administered Medications   Medication     acetaminophen (TYLENOL) Suppository 650 mg     acetaminophen (TYLENOL) tablet 650 mg     albuterol (PROAIR HFA/PROVENTIL HFA/VENTOLIN HFA) 108 (90 Base) MCG/ACT inhaler 1-2 puff     bismuth  subsalicylate (PEPTO BISMOL) chewable tablet 524 mg     fluticasone (FLONASE) 50 MCG/ACT spray 2 spray     hydrALAZINE (APRESOLINE) injection 10 mg     levothyroxine (SYNTHROID/LEVOTHROID) tablet 88 mcg     lidocaine (LMX4) cream     lidocaine 1 % 0.1-1 mL     magnesium hydroxide (MILK OF MAGNESIA) suspension 30 mL     melatonin tablet 1 mg     melatonin tablet 3 mg     morphine (PF) injection 1 mg     naloxone (NARCAN) injection 0.1-0.4 mg     omeprazole (priLOSEC) CR capsule 20 mg     ondansetron (ZOFRAN-ODT) ODT tab 4 mg    Or     ondansetron (ZOFRAN) injection 4 mg     oxyCODONE IR (ROXICODONE) half-tab 2.5-5 mg     potassium chloride (KLOR-CON) Packet 20-40 mEq     potassium chloride 10 mEq in 100 mL intermittent infusion with 10 mg lidocaine     potassium chloride 10 mEq in 100 mL sterile water intermittent infusion (premix)     potassium chloride 20 mEq in 50 mL intermittent infusion     potassium chloride ER (K-DUR/KLOR-CON M) CR tablet 20-40 mEq     predniSONE (DELTASONE) tablet 20 mg     ranitidine (ZANTAC) tablet 150 mg     senna-docusate (SENOKOT-S/PERICOLACE) 8.6-50 MG per tablet 1 tablet    Or     senna-docusate (SENOKOT-S/PERICOLACE) 8.6-50 MG per tablet 2 tablet     sertraline (ZOLOFT) tablet 50 mg     sodium chloride (PF) 0.9% PF flush 3 mL     sodium chloride (PF) 0.9% PF flush 3 mL     sodium chloride 0.9% infusion             Review of Systems:   CV: NEGATIVE for chest pain, palpitations or peripheral edema  C: NEGATIVE for fever, chills, change in weight  E/M: NEGATIVE for ear, mouth and throat problems  R: NEGATIVE for significant cough or SOB          Physical Exam:   All vitals have been reviewed  Patient Vitals for the past 24 hrs:   BP Temp Temp src Pulse Resp SpO2 Height Weight   08/27/19 0759 128/75 97.9  F (36.6  C) Oral 79 16 90 % -- --   08/27/19 0655 -- -- -- -- 15 -- -- --   08/27/19 0107 -- -- -- -- 16 -- -- --   08/27/19 0008 -- -- -- -- 16 -- -- --   08/27/19 0001 (!) 141/81 97.8  " F (36.6  C) Oral 80 16 92 % -- --   08/26/19 2249 123/65 -- -- 79 16 92 % -- --   08/26/19 2001 (!) 158/76 98.8  F (37.1  C) Oral 83 16 95 % 1.651 m (5' 5\") 52.2 kg (115 lb)       Intake/Output Summary (Last 24 hours) at 8/27/2019 1219  Last data filed at 8/27/2019 0658  Gross per 24 hour   Intake 508 ml   Output 100 ml   Net 408 ml     Patient alert and conversive during exam.    On physical exam of the Left lower extremity, patient's leg is resting in a mildly shortened and externally rotated position.  No skin abrasions or ecchymosis seen.  Patient is able to dorsi and plantarflex both ankles with equal resistance.  Full sensation to light touch on the left versus right lower extremities.  Distal pulses are intact and equal bilaterally.              Data:   All laboratory data reviewed  Results for orders placed or performed during the hospital encounter of 08/26/19   Lumbar spine XR, 2-3 views    Narrative    LUMBAR SPINE TWO TO THREE VIEWS    8/26/2019 8:35 PM     HISTORY: Pain, difficulty ambulating.    COMPARISON: None available. Correlation is made with previous CT the  abdomen and pelvis dated 5/16/2011.    FINDINGS: There is diffuse osseous demineralization and makes  assessment for subtle or nondisplaced fracture difficult. There is a  severe, age indeterminate compression fracture of L1. There is slight  buckling of the posterior cortex along the ventral aspect of the  spinal canal. There is also slight concavity involving the superior  endplate of L2, L3 and L4, which appears chronic in nature. There is  mild anterolisthesis of L5 on S1. There is increased dextroconvex  scoliosis of the thoracolumbar spine centered near the thoracolumbar  junction. There is a background of multilevel degenerative disc  disease and facet arthropathy, most pronounced in the lower lumbar  levels. There is moderate disc height loss at L3-4 and L4-5 and severe  disc height loss at L5-S1, with severe L5-S1 facet " arthrosis.  Partially visualized right hip hemiarthroplasty changes and a screw in  the left hip, better characterized on pelvic radiographs of same date.      Impression    IMPRESSION:  1. Age-indeterminate severe L1 compression fracture.  2. Slight, chronic-appearing concavity of the superior endplates of  L2, L3 and L4.  3. Multilevel degenerative changes of the lumbar spine, most  pronounced at the lower lumbar levels.     DANIEL GUZMAN MD   XR Pelvis and Hip Left 1 View    Narrative    XR PELVIS AND HIP LEFT 1 VIEW   8/26/2019 8:30 PM     HISTORY:  recent surgery, pain    COMPARISON: 7/1/2019      Impression    IMPRESSION: Status post ORIF of the left femoral neck fracture.  Interval hardware failure/re-fracture with superior displacement of  the distal fracture fragment. Normal joint alignment maintained.  Status post right hip bipolar hemiarthroplasty. Osteopenia.  Atherosclerosis.    AJAY WILLIAM MD   XR Chest 1 View    Narrative    XR CHEST ONE VIEW   8/26/2019 8:45 PM     HISTORY: Left hip fracture. Preoperative chest x-ray.    COMPARISON: Chest x-ray 6/30/2019.      Impression    IMPRESSION: Supine view of the chest is performed. Scattered  interstitial changes appear stable compared to prior exam. Underlying  COPD is likely. No new infiltrate or lung consolidation. No  pneumothorax or pleural effusions. Heart is normal in size. Aorta  demonstrates calcified plaque and is tortuous but unchanged.  Compression fracture in the region of the thoracolumbar junction of  the spine is noted. This is unchanged compared to prior x-ray.    NIMESH CASTRO MD   CBC with platelets   Result Value Ref Range    WBC 11.9 (H) 4.0 - 11.0 10e9/L    RBC Count 3.75 (L) 3.8 - 5.2 10e12/L    Hemoglobin 12.7 11.7 - 15.7 g/dL    Hematocrit 38.1 35.0 - 47.0 %     (H) 78 - 100 fl    MCH 33.9 (H) 26.5 - 33.0 pg    MCHC 33.3 31.5 - 36.5 g/dL    RDW 14.3 10.0 - 15.0 %    Platelet Count 368 150 - 450 10e9/L   Basic metabolic  panel   Result Value Ref Range    Sodium 140 133 - 144 mmol/L    Potassium 4.1 3.4 - 5.3 mmol/L    Chloride 105 94 - 109 mmol/L    Carbon Dioxide 33 (H) 20 - 32 mmol/L    Anion Gap 2 (L) 3 - 14 mmol/L    Glucose 98 70 - 99 mg/dL    Urea Nitrogen 15 7 - 30 mg/dL    Creatinine 0.50 (L) 0.52 - 1.04 mg/dL    GFR Estimate 86 >60 mL/min/[1.73_m2]    GFR Estimate If Black >90 >60 mL/min/[1.73_m2]    Calcium 10.0 8.5 - 10.1 mg/dL   CBC with platelets   Result Value Ref Range    WBC 12.1 (H) 4.0 - 11.0 10e9/L    RBC Count 3.62 (L) 3.8 - 5.2 10e12/L    Hemoglobin 11.9 11.7 - 15.7 g/dL    Hematocrit 36.8 35.0 - 47.0 %     (H) 78 - 100 fl    MCH 32.9 26.5 - 33.0 pg    MCHC 32.3 31.5 - 36.5 g/dL    RDW 14.3 10.0 - 15.0 %    Platelet Count 357 150 - 450 10e9/L          Attestation:  I have reviewed today's vital signs, notes, medications, labs and imaging with Dr. Barger.  Amount of time performed on this consult: 30 minutes.    Chandrika Kan PA-C

## 2019-08-27 NOTE — PLAN OF CARE
A&OX4. VSS on RA. CMS intact. Bedrest. NPO at midnight. Taking oxycodone for pain. Ortho consult pending. Continue to monitor.

## 2019-08-27 NOTE — OP NOTE
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    PROCEDURE:   1. L hip hemiarthroplasty.  2. L proximal femur hardware removal.    ATTENDING SURGEON: Dr. Thaddeus Barger.     ASSISTANT SURGEON: Emiliano Hull PA-C.    ANESTHESIA: General.    ESTIMATED BLOOD LOSS: 100 cc.     COMPLICATIONS: None apparent.     IMPLANTS: Biomet Echo hemiarthroplasty system with size 13 cemented femoral stem, +12mm neck, and 48mm bipolar femoral head.    A skilled surgical assistant, Emiliano Hull PA-C, was necessary and utilized during the case to assist with patient positioning, prepping and draping, completing the soft tissue dissection, bone cuts, and securing the prosthesis, wound closure, and dressing application.  The assistant was utilized throughout the entire case.    INDICATIONS: Aliya is a pleasant 89-year-old female who sustained a mechanical fall onto the left side 2 months ago with a valgus impacted femoral neck fracture.  This was treated with ORIF but has unfortunately proceeded to fail with a nonunion and associated hardware cut-out.  Given the patient's age and activity level, a left hip hemiarthroplasty was recommended.  The benefits and risks of surgery were reviewed in full with the patient, including the risks of dislocation, hardware failure, and potential for future conversion to a total hip arthroplasty, and the patient provided informed consent to proceed.     The patient was identified in the preoperative holding area on the date of surgery. The operative site was marked with indelible marker and the patient was brought back to the operating room and transferred to the operating table in a right lateral decubitus position after successful administration of anesthesia. All bony prominences were well padded. The left leg was prepped and draped in standard sterile fashion.  A preoperative timeout was performed, identifying the correct patient, procedure, operative site, antibiotic administration and equipment necessary for the procedure.     A standard posterior approach was utilized and soft tissue dissection was carefully carried down maintaining excellent hemostasis. The gluteal fascia was encountered and sharply incised directly over the greater trochanter. There was a small trochanteric bursa which was excised.    The previous hip fracture screw and plate construct were identified by making a small incision through the IT band fascia and the hardware was all removed without complication.    The piriformis and external rotators were tagged and an L-shaped capsulotomy was created taking care to protect the gluteus medius and minimus muscle bellies during exposure of the hip joint. The fracture site was identified and the femoral head was removed without difficulty. The femoral head measured approximately 48 mm. The proximal femur was then delivered into the wound. A  was utilized to access the femoral canal and an opening reamer was utilized to prepare the canal for the reamers and broaches. Reaming and broaching was carried down to a size 14 broach and a size 13 cemented femoral stem was selected. Trial components were implanted into position with excellent stability noted of the hip. The trial components were removed and the femoral canal was thoroughly irrigated. A 18 mm cement restrictor was implanted. A centralizer was utilized at the distal end of the femoral stem. A size 13 cemented femoral stem was then inserted after cement was injected into the femoral canal and pressurized. After assuring adequate curing of the cement, trial head and neck components were implanted and excellent hip stability was once again noted. A +12mm offset neck and 48 mm bipolar head were then implanted and the hip was reduced. The hip demonstrated adequate range of motion in extension  without evidence of impingement. The patient demonstrated full internal rotation of the leg with the hip held in 45 degrees of flexion without dislocating. The patient demonstrated nearly 75-80 degrees of internal rotation with the hip held in 90 degrees of flexion before subluxation occurred. The wound was copiously irrigated. The hip capsule was reapproximated with interrupted #0 Ethibond suture. The gluteal fascia was reapproximated with interrupted 0 Vicryl suture. 2-0 Vicryl was utilized for closure of the superficial fascial layer.  Subcutaneous tissues and skin were reapproximated with interrupted 3-0 Monocryl suture and staples respectively. Dermabond was applied. Xeroform and sterile dressings were applied following administration of 0.5% Marcaine along the wound.  The patient was extubated and brought to the PACU in stable condition for further post-operative care.    Postoperatively the patient will be allowed to bear weight as tolerated on the left lower extremity with an assistive device. The patient will be placed on ASA for DVT prophylaxis. The patient will return to clinic for followup in approximately 2 weeks for repeat evaluation including wound check, staple removal and left hip and AP pelvis radiographs.     Of note all counts were correct at the conclusion of the case.     CLAIR GOLDSTEIN MD

## 2019-08-27 NOTE — PROGRESS NOTES
RECEIVING UNIT ED HANDOFF REVIEW    ED Nurse Handoff Report was reviewed by: KP BROWN RN on August 26, 2019 at 11:23 PM

## 2019-08-27 NOTE — ANESTHESIA PROCEDURE NOTES
Peripheral nerve/Neuraxial procedure note : intrathecal  Pre-Procedure  Performed by Shaggy George DO  Location: OR      Pre-Anesthestic Checklist: patient identified, IV checked, site marked, risks and benefits discussed, informed consent, monitors and equipment checked, pre-op evaluation and at physician/surgeon's request    Timeout  Correct Patient: Yes   Correct Procedure: Yes   Correct Site: Yes   Correct Laterality: Yes   Correct Position: Yes   Site Marked: Yes   .   Procedure Documentation  ASA 3  .    Procedure:    Intrathecal.  Insertion Site:L2-3  (midline approach)      Patient Prep;povidone-iodine 7.5% surgical scrub.  .  Needle: Eddie tip Spinal Needle (gauge): 22  Spinal/LP Needle Length (inches): 3 # of attempts: 2 and  # of redirects:  2 Introducer used Introducer: 20 G .       Assessment/Narrative  Paresthesias: No.  .  .  clear CSF fluid removed while lateral   . Comments:  Patient sitting on edge of OR bed, lower back cleaned and prepped in sterile fashion with betadine. 1% lido used to numb area. Introducer placed, spinal needle through introducer. Appropriate flow of CSF and confirmed with aspiration via syringe. Spinal dose given, 12 mg 0.75% bupivacaine. No complications.

## 2019-08-27 NOTE — PROGRESS NOTES
"[facility]    Medicine Progress Note - Hospitalist Service       Date of Admission:  8/26/2019  Assessment & Plan      L hip hardware failure  S/p \"percutaneous fixation of the left fix with synthes of the femoral neck for a closed displaced fracture of the hip\" 7/1/2019. Initially did well post procedure, but then in the last month or so started to decline. States day of presentation minimally able to walk. Stable vitals on arrival in the ED. Exam largely unremarkable, hip motion not examined. WBC sl elevated at 11.9. Xray pelvis with L hip hardware failure.   - orthopedic surgery consult, planning on OR today  - NPO, IV fluids for now  - prn acetaminophen, prn oxycodone, prn IV morphine for breakthrough  - low CV risk patient, medically optimized     Leukocytosis  WBC at 11.9 on admission. Afebrile, vitals stable. No obvious infection in area of L hip. Denies urinary symptoms. Could be stress or from chronic prednisone  - repeat CBC in the am     Polyarteritis nodosa  Polyneuropathy  Outpatient on imuran 150 mg daily for this. Appears to have been started on prednisone 20 mg daily on 8/13 2/2 concern that L hip pain was related to PAN (per pt). Her outpatient rheumatologist appointment was this week to discuss a taper regimen.  - hold imuran for now pending surgery plans  - given fewer than 3 weeks on prednisone no need for stress dose steroids, but would continue her PTA steroids to ovoid abrupt withdrawal  - follow-up with her rheumatologist to discuss prednisone taper     Hypothyroidism  Followed by outpatient primary provider, Dr. Hanley  - Continue prior to admission levothyroxine 88 mcg daily     GERD  Continue prior to admission omeprazole 20 mg daily and ranitidine 150 mg BID     Depression  Continue prior to admission sertraline.    Diet: NPO per Anesthesia Guidelines for Procedure/Surgery Except for: Meds    DVT Prophylaxis: Pneumatic Compression Devices  Rivas Catheter: not " present  Code Status: DNR/DNI      Disposition Plan   Expected discharge: 2 - 3 days, recommended to transitional care unit once after OR trip.  Entered: Avni Cyr DO 08/27/2019, 3:45 PM       The patient's care was discussed with the Patient.    Avni Cyr DO  Hospitalist Service  [facility]    ______________________________________________________________________    Interval History   Pain is manageable at rest, but unable to walk suddenly. No urinary symptoms. Other than hip pain, she is doing well.     Data reviewed today: I reviewed all medications, new labs and imaging results over the last 24 hours. I personally reviewed no images or EKG's today.    Physical Exam   Vital Signs: Temp: 99.3  F (37.4  C) Temp src: Tympanic BP: (!) 153/79 Pulse: 79   Resp: 14 SpO2: 91 % O2 Device: None (Room air)    Weight: 115 lbs 0 oz  Constitutional: alert, oriented and in no acute distress  HEENT: OP clear  Respiratory: CTA B without w/c  Cardiovascular: RRR without m/r/g  GI: soft, nontender, nondistended, no HSM  Lymph/Hematologic: no cervical LAD  Genitourinary: deferred  Skin: no rashes or lesions grossly  Musculoskeletal: no deformities or arthritis. No evidence of erythema or joint deformity over L hip area.  Psychiatric: mood and affect wnl    Data   Recent Labs   Lab 08/27/19  0647 08/26/19 2011   WBC 12.1* 11.9*   HGB 11.9 12.7   * 102*    368   NA  --  140   POTASSIUM  --  4.1   CHLORIDE  --  105   CO2  --  33*   BUN  --  15   CR  --  0.50*   ANIONGAP  --  2*   MICHELLE  --  10.0   GLC  --  98     Recent Results (from the past 24 hour(s))   XR Pelvis and Hip Left 1 View    Narrative    XR PELVIS AND HIP LEFT 1 VIEW   8/26/2019 8:30 PM     HISTORY:  recent surgery, pain    COMPARISON: 7/1/2019      Impression    IMPRESSION: Status post ORIF of the left femoral neck fracture.  Interval hardware failure/re-fracture with superior displacement of  the distal fracture  fragment. Normal joint alignment maintained.  Status post right hip bipolar hemiarthroplasty. Osteopenia.  Atherosclerosis.    AJAY WILLIAM MD   Lumbar spine XR, 2-3 views    Narrative    LUMBAR SPINE TWO TO THREE VIEWS    8/26/2019 8:35 PM     HISTORY: Pain, difficulty ambulating.    COMPARISON: None available. Correlation is made with previous CT the  abdomen and pelvis dated 5/16/2011.    FINDINGS: There is diffuse osseous demineralization and makes  assessment for subtle or nondisplaced fracture difficult. There is a  severe, age indeterminate compression fracture of L1. There is slight  buckling of the posterior cortex along the ventral aspect of the  spinal canal. There is also slight concavity involving the superior  endplate of L2, L3 and L4, which appears chronic in nature. There is  mild anterolisthesis of L5 on S1. There is increased dextroconvex  scoliosis of the thoracolumbar spine centered near the thoracolumbar  junction. There is a background of multilevel degenerative disc  disease and facet arthropathy, most pronounced in the lower lumbar  levels. There is moderate disc height loss at L3-4 and L4-5 and severe  disc height loss at L5-S1, with severe L5-S1 facet arthrosis.  Partially visualized right hip hemiarthroplasty changes and a screw in  the left hip, better characterized on pelvic radiographs of same date.      Impression    IMPRESSION:  1. Age-indeterminate severe L1 compression fracture.  2. Slight, chronic-appearing concavity of the superior endplates of  L2, L3 and L4.  3. Multilevel degenerative changes of the lumbar spine, most  pronounced at the lower lumbar levels.     DANIEL GUZMAN MD   XR Chest 1 View    Narrative    XR CHEST ONE VIEW   8/26/2019 8:45 PM     HISTORY: Left hip fracture. Preoperative chest x-ray.    COMPARISON: Chest x-ray 6/30/2019.      Impression    IMPRESSION: Supine view of the chest is performed. Scattered  interstitial changes appear stable compared to  prior exam. Underlying  COPD is likely. No new infiltrate or lung consolidation. No  pneumothorax or pleural effusions. Heart is normal in size. Aorta  demonstrates calcified plaque and is tortuous but unchanged.  Compression fracture in the region of the thoracolumbar junction of  the spine is noted. This is unchanged compared to prior x-ray.    NIMESH CASTRO MD     Medications     lactated ringers 25 mL/hr at 08/27/19 1450     sodium chloride 75 mL/hr at 08/27/19 1311       ceFAZolin  1 g Intravenous See Admin Instructions     ceFAZolin  2 g Intravenous Pre-Op/Pre-procedure x 1 dose     [Auto Hold] fluticasone  2 spray Both Nostrils Daily     [Auto Hold] levothyroxine  88 mcg Oral Daily     [Auto Hold] melatonin  3 mg Oral At Bedtime     [Auto Hold] omeprazole  20 mg Oral Daily     [Auto Hold] predniSONE  20 mg Oral Daily     [Auto Hold] ranitidine  150 mg Oral BID     [Auto Hold] sertraline  50 mg Oral At Bedtime     [Auto Hold] sodium chloride (PF)  3 mL Intracatheter Q8H

## 2019-08-27 NOTE — PROGRESS NOTES
[facility]  Patient is currently open to home care services with [facility]. The patient is currently receiving RN and PT services. [facility]  and team have been notified of patient admission. [facility] liaison will continue to follow patient during stay. If appropriate provide orders to resume home care at time of discharge.

## 2019-08-28 ENCOUNTER — APPOINTMENT (OUTPATIENT)
Dept: PHYSICAL THERAPY | Facility: CLINIC | Age: 84
DRG: 470 | End: 2019-08-28
Attending: PHYSICIAN ASSISTANT
Payer: COMMERCIAL

## 2019-08-28 LAB
GLUCOSE SERPL-MCNC: 116 MG/DL (ref 70–99)
HGB BLD-MCNC: 11.5 G/DL (ref 11.7–15.7)

## 2019-08-28 PROCEDURE — 25000132 ZZH RX MED GY IP 250 OP 250 PS 637: Performed by: HOSPITALIST

## 2019-08-28 PROCEDURE — 97530 THERAPEUTIC ACTIVITIES: CPT | Mod: GP

## 2019-08-28 PROCEDURE — 97110 THERAPEUTIC EXERCISES: CPT | Mod: GP

## 2019-08-28 PROCEDURE — 99232 SBSQ HOSP IP/OBS MODERATE 35: CPT | Performed by: HOSPITALIST

## 2019-08-28 PROCEDURE — 25000131 ZZH RX MED GY IP 250 OP 636 PS 637: Performed by: HOSPITALIST

## 2019-08-28 PROCEDURE — 25000128 H RX IP 250 OP 636: Performed by: PHYSICIAN ASSISTANT

## 2019-08-28 PROCEDURE — 25000132 ZZH RX MED GY IP 250 OP 250 PS 637: Performed by: PHYSICIAN ASSISTANT

## 2019-08-28 PROCEDURE — 25000132 ZZH RX MED GY IP 250 OP 250 PS 637: Performed by: ORTHOPAEDIC SURGERY

## 2019-08-28 PROCEDURE — 97161 PT EVAL LOW COMPLEX 20 MIN: CPT | Mod: GP

## 2019-08-28 PROCEDURE — 85018 HEMOGLOBIN: CPT | Performed by: PHYSICIAN ASSISTANT

## 2019-08-28 PROCEDURE — 82947 ASSAY GLUCOSE BLOOD QUANT: CPT | Performed by: PHYSICIAN ASSISTANT

## 2019-08-28 PROCEDURE — 25000132 ZZH RX MED GY IP 250 OP 250 PS 637: Performed by: INTERNAL MEDICINE

## 2019-08-28 PROCEDURE — 36415 COLL VENOUS BLD VENIPUNCTURE: CPT | Performed by: PHYSICIAN ASSISTANT

## 2019-08-28 PROCEDURE — 12000000 ZZH R&B MED SURG/OB

## 2019-08-28 RX ORDER — AMOXICILLIN 250 MG
1 CAPSULE ORAL 2 TIMES DAILY
Status: DISCONTINUED | OUTPATIENT
Start: 2019-08-28 | End: 2019-08-30 | Stop reason: HOSPADM

## 2019-08-28 RX ORDER — PREDNISONE 5 MG/1
10 TABLET ORAL DAILY
Status: DISCONTINUED | OUTPATIENT
Start: 2019-08-29 | End: 2019-08-28

## 2019-08-28 RX ORDER — PANTOPRAZOLE SODIUM 40 MG/1
40 TABLET, DELAYED RELEASE ORAL
Status: DISCONTINUED | OUTPATIENT
Start: 2019-08-28 | End: 2019-08-28

## 2019-08-28 RX ADMIN — ACETAMINOPHEN 650 MG: 325 TABLET ORAL at 03:08

## 2019-08-28 RX ADMIN — ACETAMINOPHEN 650 MG: 325 TABLET ORAL at 21:10

## 2019-08-28 RX ADMIN — OXYCODONE HYDROCHLORIDE 5 MG: 5 TABLET ORAL at 00:05

## 2019-08-28 RX ADMIN — ACETAMINOPHEN 650 MG: 325 TABLET ORAL at 07:08

## 2019-08-28 RX ADMIN — SERTRALINE HYDROCHLORIDE 50 MG: 50 TABLET ORAL at 21:09

## 2019-08-28 RX ADMIN — ASPIRIN 325 MG: 325 TABLET, DELAYED RELEASE ORAL at 08:04

## 2019-08-28 RX ADMIN — FLUTICASONE PROPIONATE 2 SPRAY: 50 SPRAY, METERED NASAL at 09:33

## 2019-08-28 RX ADMIN — OXYCODONE HYDROCHLORIDE 2.5 MG: 5 TABLET ORAL at 08:03

## 2019-08-28 RX ADMIN — RANITIDINE 150 MG: 150 TABLET ORAL at 21:09

## 2019-08-28 RX ADMIN — OMEPRAZOLE 20 MG: 20 CAPSULE, DELAYED RELEASE ORAL at 08:03

## 2019-08-28 RX ADMIN — CEFAZOLIN 1 G: 1 INJECTION, POWDER, FOR SOLUTION INTRAMUSCULAR; INTRAVENOUS at 00:06

## 2019-08-28 RX ADMIN — MELATONIN 3 MG: 3 TAB ORAL at 21:09

## 2019-08-28 RX ADMIN — OXYCODONE HYDROCHLORIDE 5 MG: 5 TABLET ORAL at 03:08

## 2019-08-28 RX ADMIN — CEFAZOLIN 1 G: 1 INJECTION, POWDER, FOR SOLUTION INTRAMUSCULAR; INTRAVENOUS at 08:01

## 2019-08-28 RX ADMIN — SENNOSIDES AND DOCUSATE SODIUM 1 TABLET: 8.6; 5 TABLET ORAL at 21:09

## 2019-08-28 RX ADMIN — RANITIDINE 150 MG: 150 TABLET ORAL at 08:03

## 2019-08-28 RX ADMIN — PREDNISONE 20 MG: 20 TABLET ORAL at 08:04

## 2019-08-28 RX ADMIN — LEVOTHYROXINE SODIUM 88 MCG: 88 TABLET ORAL at 08:03

## 2019-08-28 ASSESSMENT — ACTIVITIES OF DAILY LIVING (ADL)
ADLS_ACUITY_SCORE: 32
ADLS_ACUITY_SCORE: 25
ADLS_ACUITY_SCORE: 23
ADLS_ACUITY_SCORE: 32
ADLS_ACUITY_SCORE: 25
ADLS_ACUITY_SCORE: 31

## 2019-08-28 NOTE — PROGRESS NOTES
"[facility]    Medicine Progress Note - Hospitalist Service       Date of Admission:  8/26/2019  Assessment & Plan      L hip hardware failure  S/p \"percutaneous fixation of the left fix with synthes of the femoral neck for a closed displaced fracture of the hip\" 7/1/2019. Initially did well post procedure, but then in the last month or so started to decline. States day of presentation minimally able to walk. Stable vitals on arrival in the ED. Exam largely unremarkable, hip motion not examined. WBC sl elevated at 11.9. Xray pelvis with L hip hardware failure.   - orthopedic surgery consult, planning on OR today  - NPO, IV fluids for now  - prn acetaminophen, prn oxycodone, prn IV morphine for breakthrough  - low CV risk patient, medically optimized     Leukocytosis  WBC at 11.9 on admission. Afebrile, vitals stable. No obvious infection in area of L hip. Denies urinary symptoms. Could be stress or from chronic prednisone  - repeat CBC in the am     Polyarteritis nodosa  Polyneuropathy  Outpatient on imuran 150 mg daily for this. Appears to have been started on prednisone 20 mg daily on 8/13 2/2 concern that L hip pain was related to PAN (per pt). Her outpatient rheumatologist appointment was this week to discuss a taper regimen.  - hold imuran for now pending surgery plans  - given fewer than 3 weeks on prednisone no need for stress dose  - patient reports that the prednisone was prescribed because of concern for PAN causing her pain. As it has clear mechanical cause, will stop prednisone as it can impair wound healing.  - follow-up with her rheumatologist prn     Hypothyroidism  Followed by outpatient primary provider, Dr. Hanley  - Continue prior to admission levothyroxine 88 mcg daily     GERD  Continue prior to admission omeprazole 20 mg daily and ranitidine 150 mg BID     Depression  Continue prior to admission sertraline.    Diet: Advance Diet as Tolerated: Regular Diet Adult    DVT " Prophylaxis: Pneumatic Compression Devices  Rivas Catheter: not present  Code Status: DNR/DNI      Disposition Plan   Expected discharge: 2 - 3 days, recommended to transitional care unit once after OR trip.  Entered: Avni Cyr DO 08/28/2019, 4:00 PM       The patient's care was discussed with the Patient.    Anvi Cyr DO  Hospitalist Service  [facility]    ______________________________________________________________________    Interval History   Pain is manageable at rest,   Doing well otherwise. Working with therapy    Data reviewed today: I reviewed all medications, new labs and imaging results over the last 24 hours. I personally reviewed no images or EKG's today.    Physical Exam   Vital Signs: Temp: 97.8  F (36.6  C) Temp src: Oral BP: 111/75 Pulse: 83 Heart Rate: 92 Resp: 16 SpO2: 96 % O2 Device: None (Room air) Oxygen Delivery: 3 LPM  Weight: 115 lbs 0 oz  Constitutional: alert, oriented and in no acute distress  HEENT: OP clear  Respiratory: CTA B without w/c  Cardiovascular: RRR without m/r/g  GI: soft, nontender, nondistended, no HSM  Lymph/Hematologic: no cervical LAD  Genitourinary: deferred  Skin: no rashes or lesions grossly  Musculoskeletal: no deformities or arthritis. No evidence of erythema or joint deformity over L hip area.  Psychiatric: mood and affect wnl    Data   Recent Labs   Lab 08/28/19  0641 08/27/19  0647 08/26/19 2011   WBC  --  12.1* 11.9*   HGB 11.5* 11.9 12.7   MCV  --  102* 102*   PLT  --  357 368   NA  --   --  140   POTASSIUM  --   --  4.1   CHLORIDE  --   --  105   CO2  --   --  33*   BUN  --   --  15   CR  --   --  0.50*   ANIONGAP  --   --  2*   MICHELLE  --   --  10.0   *  --  98     Recent Results (from the past 24 hour(s))   XR Pelvis w Hip Port Left 1 View    Narrative    PELVIS AND HIP PORTABLE LEFT ONE VIEW 8/27/2019 6:42 PM     HISTORY: Status post left hip hemiarthroplasty biomet bipolar, with  proximal femur hardware  removal.    COMPARISON: 8/26/2019      Impression    IMPRESSION: Left hip hemiarthroplasty. Radiodense material lateral to  the proximal femur, new since 8/26/2019, likely representing extruded  cement. No other evidence of complication. Postoperative soft tissue  gas and skin staples are noted. Right hip hemiarthroplasty is noted  and probably unchanged from 8/26/2019.    SEBASTIAN BACON MD     Medications       aspirin  325 mg Oral Daily     fluticasone  2 spray Both Nostrils Daily     levothyroxine  88 mcg Oral Daily     melatonin  3 mg Oral At Bedtime     [START ON 8/29/2019] omeprazole  40 mg Oral Daily     ranitidine  150 mg Oral BID     senna-docusate  1 tablet Oral BID     sertraline  50 mg Oral At Bedtime     sodium chloride (PF)  3 mL Intracatheter Q8H     sodium chloride (PF)  3 mL Intracatheter Q8H

## 2019-08-28 NOTE — PLAN OF CARE
Discharge Planner PT   Patient plan for discharge: Not stated  Current status: Orders received, chart reviewed, PT evaluation completed and treatment initiated. Patient admitted on 8/26/19 for evaluation of hip pain following surgery. 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 Patient is now POD-1 from L hip hemiarthroplasty and L proximal femur hardware removal. Patient with orders for WBAT on L LE and posterior hip precautions. Patient lives in a senior apartment with no steps to contend with. Patient is normally independent at baseline with 4WW, has A with medication management and showers. Patient states as of recently she has been having meals delivered to her room instead of walking down to the dining room 2/2 to pain.     Patient supine in bed upon arrival of therapist, agreeable to working with PT. Educated on role of PT and PT POC. Discussed WBAT on L LE and posterior hip precautions. Educated and instructed patient in supine LEXIS exercises. Supine>sit with Mod A, sit<>stand from EOB with FWW and Min A x 2, bracing backs of legs on bed with cues and A to correct. Patient able to take a few steps from bed to chair with FWW and Min A x 2 with A to guide walker and cues for progression. Patient needing cues to back up to chair fully before sitting. Patient in chair at end of session with all needs in reach and chair alarm on.   Barriers to return to prior living situation: Current level of A, decreased tolerance to activity, falls risk   Recommendations for discharge: TCU  Rationale for recommendations: Patient requiring A x 1-2 with all mobility/ambulation. Patient would benefit from continued skilled therapy to further improve strength, balance, and independence with mobility and ambulation to address functional limitations and decrease falls risk.         Entered by: Tiki Menard 08/28/2019 10:58 AM

## 2019-08-28 NOTE — PROVIDER NOTIFICATION
Dr Barger notified of pain 10/10, after oxy 5mg and morphine 1mg. Ordered to increase q4oxy to q3 adding atarax.

## 2019-08-28 NOTE — PLAN OF CARE
A&OX4, forgetful at times.  Up with assist of 2 and walker to transfer from bed to chair.  Up in chair for breakfast and dinner today.  Dressing C/D/I.  Incontinent or urine, purewick in place.  Oxycodone given X1 before PT.  Rates her pain 2/10 at rest.  Patient states she would like to take an oxycodone again at bedtime.  Progressing per plan of care.

## 2019-08-28 NOTE — CONSULTS
Care Transition Initial Assessment - SW     Met with: Patient, son-Wilfred    Active Problems:    Hip fracture, left (H)    Hip fracture requiring operative repair, left, closed, initial encounter (H)       DATA  Lives With: alone   Living Arrangements: (senior living )  Quality of Family Relationships: supportive, involved  Description of Support System: Involved, Supportive  Who is your support system?: Children  Identified issues/concerns regarding health management: discharge planning   Quality of Family Relationships: supportive, involved    ASSESSMENT  Cognitive Status:  awake and alert  Concerns to be addressed:      Per SW consult for discharge planning. Pt admitted 8/26 with a left hip fracture. Pt will tentatively discharge 8/30. Reviewed medical record. Met with pt and son to discuss discharge plan. Pt was previously living at [facility] with some services. Prior to hospitalization pt was at [facility] for rehab after a hip fracture. Discussed recommendation for TCU. Pt is in agreement with TCU and would like to return to [facility]. SW will send a referral via DOD and then follow-up with pt and son.      PLAN  Financial costs for the patient includes: n/a   Patient given options and choices for discharge: n/a-already picked [facility]  Patient/family is agreeable to the plan?  Yes  Transportation/person: TBD  Patient Goals and Preferences: TCU   Patient anticipates discharging to: TCU    Will continue to follow for discharge planning     Danielle STRINGER, ZACHARY

## 2019-08-28 NOTE — PLAN OF CARE
Pt is AOx3-4, forgetful at times, CMS intact, VSS, dressing CDI, pain is managed with atarax, tylenol, oxy. Pt rested well after midnight, tolerating diet, voiding, purewick placed for incontinence management. Pt declined ambulation overnight stating pain, educated pt on early ambulation. Will cont to monitor.

## 2019-08-28 NOTE — PROGRESS NOTES
Orthopedic Surgery  Rayne Mabry  2019  Admit Date:  2019  POD: 1 Day Post-Op   Procedure(s):  LEFT HIP YUDITH ARTHROPLASTY,  WITH PROXIMAL FEMUR HARDWARE REMOVAL    Alert and orient to person, place, and time.  Patient resting comfortably in chair.    Pain fairly well controlled.  Tolerating oral intake.    Denies nausea or vomiting  Denies chest pain or shortness of breath    Vital Sign Ranges  Temperature Temp  Av.2  F (36.8  C)  Min: 97.4  F (36.3  C)  Max: 99.3  F (37.4  C)   Blood pressure Systolic (24hrs), Av , Min:102 , Max:153        Diastolic (24hrs), Av, Min:59, Max:79      Pulse Pulse  Av.4  Min: 83  Max: 95   Respirations Resp  Av.6  Min: 14  Max: 22   Pulse oximetry SpO2  Av.9 %  Min: 90 %  Max: 100 %       Dressing is clean, dry, and intact.   Minimal erythema of the surrounding skin.   Bilateral calves are soft, non-tender.  Left lower extremity is NVI.  Sensation intact bilateral lower extremities  Patient able to resist dorsi and plantar flexion bilaterally  +Dp pulse    Labs:  Recent Labs   Lab Test 19  0649   POTASSIUM 4.1 4.2 4.0     Recent Labs   Lab Test 19  0641 19  0647 19   HGB 11.5* 11.9 12.7     Recent Labs   Lab Test 14  2200   INR 1.00     Recent Labs   Lab Test 19  0647 19  0541    368 233       1. PLAN:   Continue ASA for DVT prophylaxis.     Mobilize with PT/OT    WBAT LLE with walker.     Continue current pain regiment.   Dressings: Keep intact.  Change to aquacel POD #2 if no drainage   Follow-up: 2 weeks Dr Barger    2. Disposition   Anticipate d/c to TCU 2 days when medically cleared and progressing in PT.    Chandrika Kan PA-C     Normal for race

## 2019-08-28 NOTE — PROGRESS NOTES
08/28/19 0800   Quick Adds   Type of Visit Initial PT Evaluation   Living Environment   Lives With alone   Living Arrangements   (senior living )   Home Accessibility no concerns   Functional Level Prior   Ambulation 1-->assistive equipment   Transferring 1-->assistive equipment   Toileting 1-->assistive equipment   Bathing 2-->assistive person   Communication 0-->understands/communicates without difficulty   Swallowing 0-->swallows foods/liquids without difficulty   Fall history within last six months yes   Number of times patient has fallen within last six months 1   Which of the above functional risks had a recent onset or change? transferring;ambulation   General Information   Onset of Illness/Injury or Date of Surgery - Date 08/26/19   Referring Physician Emiliano Hull PA-C   Patient/Family Goals Statement Not stated   Pertinent History of Current Problem (include personal factors and/or comorbidities that impact the POC) Patient admitted on 8/26/19 for evaluation of hip pain following surgery. 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 Patient is now POD-1 from L hip hemiarthroplasty and L proximal femur hardware removal. Patient with orders for WBAT on L LE and posterior hip precautions.    Precautions/Limitations fall precautions   Weight-Bearing Status - LLE weight-bearing as tolerated   Weight-Bearing Status - RLE full weight-bearing   General Observations Patient supine in bed upon arrival of therapist, agreeable to working with PT    Cognitive Status Examination   Orientation orientation to person, place and time   Level of Consciousness alert   Follows Commands and Answers Questions 100% of the time;able to follow multistep instructions   Pain Assessment   Patient Currently in Pain Yes, see Vital Sign flowsheet  (2/10)   Integumentary/Edema   Integumentary/Edema Comments Dressing intact on L hip, swelling in L forearm 2/2 to IV infiltrating   "  Range of Motion (ROM)   ROM Comment B LE ROM WNL    Strength   Strength Comments Not formally assessed but at least 3+/5 with functional transfers and gait   Bed Mobility   Bed Mobility Comments Supine>sit with Mod A x 1   Transfer Skills   Transfer Comments Sit<>stand from EOB with FWW and Min A x 2    Gait   Gait Comments Patient able to take a few steps from bed to chair with FWW and Min A x 1-2    Balance   Balance Comments Posterior lean in standing initially but then more stable with progressed ambulation    General Therapy Interventions   Planned Therapy Interventions bed mobility training;gait training;strengthening;transfer training;home program guidelines   Clinical Impression   Criteria for Skilled Therapeutic Intervention yes, treatment indicated   PT Diagnosis Impaired mobility and gait   Influenced by the following impairments pain, weakness, decreased ROM    Functional limitations due to impairments bed mobility, transfers, gait   Clinical Presentation Stable/Uncomplicated   Clinical Presentation Rationale Based on PMH, current presentation, and social support    Clinical Decision Making (Complexity) Low complexity   Therapy Frequency Daily   Predicted Duration of Therapy Intervention (days/wks) 5 days    Anticipated Discharge Disposition Transitional Care Facility   Risk & Benefits of therapy have been explained Yes   Patient, Family & other staff in agreement with plan of care Yes   [facility] [employer] \"6 Clicks\"   2016, Trustees of [facility], under license to [employer].  All rights reserved.   6 Clicks Short Forms Basic Mobility Inpatient Short Form   [facility] [employer]  \"6 Clicks\" V.2 Basic Mobility Inpatient Short Form   1. Turning from your back to your side while in a flat bed without using bedrails? 3 - A Little   2. Moving from lying on your back to sitting on the side of a flat bed without using bedrails? 2 - A Lot   3. Moving to and from a bed to a chair " (including a wheelchair)? 3 - A Little   4. Standing up from a chair using your arms (e.g., wheelchair, or bedside chair)? 3 - A Little   5. To walk in hospital room? 3 - A Little   6. Climbing 3-5 steps with a railing? 2 - A Lot   Basic Mobility Raw Score (Score out of 24.Lower scores equate to lower levels of function) 16   Total Evaluation Time   Total Evaluation Time (Minutes) 10

## 2019-08-28 NOTE — PLAN OF CARE
Discharge Planner OT     Patient plan for discharge: unknown    Current status: Orders received and chart reviewed. Per session with PT, patient is from senior living, IND in ADL at baseline, receives A with medication management and bathing. Patient was Ax1-2 for mobility with PT and now requires increased A for ADL. Will defer OT services to next level of care. Per chart pt to discharge to TCU in 2 days.  OT orders completed.     Barriers to return to prior living situation: defer to PT    Recommendations for discharge: defer to PT    Rationale for recommendations: Will defer OT services to next level of care.          Entered by: Alcira Sol 08/28/2019 11:05 AM

## 2019-08-29 ENCOUNTER — APPOINTMENT (OUTPATIENT)
Dept: PHYSICAL THERAPY | Facility: CLINIC | Age: 84
DRG: 470 | End: 2019-08-29
Payer: COMMERCIAL

## 2019-08-29 LAB
ANION GAP SERPL CALCULATED.3IONS-SCNC: 2 MMOL/L (ref 3–14)
BUN SERPL-MCNC: 12 MG/DL (ref 7–30)
CALCIUM SERPL-MCNC: 8.7 MG/DL (ref 8.5–10.1)
CHLORIDE SERPL-SCNC: 109 MMOL/L (ref 94–109)
CO2 SERPL-SCNC: 30 MMOL/L (ref 20–32)
CREAT SERPL-MCNC: 0.58 MG/DL (ref 0.52–1.04)
ERYTHROCYTE [DISTWIDTH] IN BLOOD BY AUTOMATED COUNT: 14.4 % (ref 10–15)
GFR SERPL CREATININE-BSD FRML MDRD: 81 ML/MIN/{1.73_M2}
GLUCOSE SERPL-MCNC: 98 MG/DL (ref 70–99)
HCT VFR BLD AUTO: 31.6 % (ref 35–47)
HGB BLD-MCNC: 10.4 G/DL (ref 11.7–15.7)
LACTATE BLD-SCNC: 2.4 MMOL/L (ref 0.7–2)
MCH RBC QN AUTO: 33 PG (ref 26.5–33)
MCHC RBC AUTO-ENTMCNC: 32.9 G/DL (ref 31.5–36.5)
MCV RBC AUTO: 100 FL (ref 78–100)
PLATELET # BLD AUTO: 314 10E9/L (ref 150–450)
POTASSIUM SERPL-SCNC: 3.8 MMOL/L (ref 3.4–5.3)
PROCALCITONIN SERPL-MCNC: 0.1 NG/ML
RBC # BLD AUTO: 3.15 10E12/L (ref 3.8–5.2)
SODIUM SERPL-SCNC: 141 MMOL/L (ref 133–144)
WBC # BLD AUTO: 16.6 10E9/L (ref 4–11)

## 2019-08-29 PROCEDURE — 83605 ASSAY OF LACTIC ACID: CPT | Performed by: ORTHOPAEDIC SURGERY

## 2019-08-29 PROCEDURE — 36415 COLL VENOUS BLD VENIPUNCTURE: CPT | Performed by: HOSPITALIST

## 2019-08-29 PROCEDURE — 36415 COLL VENOUS BLD VENIPUNCTURE: CPT | Performed by: PHYSICIAN ASSISTANT

## 2019-08-29 PROCEDURE — 25000132 ZZH RX MED GY IP 250 OP 250 PS 637: Performed by: ORTHOPAEDIC SURGERY

## 2019-08-29 PROCEDURE — 97110 THERAPEUTIC EXERCISES: CPT | Mod: GP | Performed by: PHYSICAL THERAPY ASSISTANT

## 2019-08-29 PROCEDURE — 84145 PROCALCITONIN (PCT): CPT | Performed by: PHYSICIAN ASSISTANT

## 2019-08-29 PROCEDURE — 25000132 ZZH RX MED GY IP 250 OP 250 PS 637: Performed by: HOSPITALIST

## 2019-08-29 PROCEDURE — 80048 BASIC METABOLIC PNL TOTAL CA: CPT | Performed by: HOSPITALIST

## 2019-08-29 PROCEDURE — 25000132 ZZH RX MED GY IP 250 OP 250 PS 637: Performed by: PHYSICIAN ASSISTANT

## 2019-08-29 PROCEDURE — 97530 THERAPEUTIC ACTIVITIES: CPT | Mod: GP | Performed by: PHYSICAL THERAPY ASSISTANT

## 2019-08-29 PROCEDURE — 85027 COMPLETE CBC AUTOMATED: CPT | Performed by: HOSPITALIST

## 2019-08-29 PROCEDURE — 25000128 H RX IP 250 OP 636: Performed by: PHYSICIAN ASSISTANT

## 2019-08-29 PROCEDURE — 97116 GAIT TRAINING THERAPY: CPT | Mod: GP | Performed by: PHYSICAL THERAPY ASSISTANT

## 2019-08-29 PROCEDURE — 12000000 ZZH R&B MED SURG/OB

## 2019-08-29 PROCEDURE — 99232 SBSQ HOSP IP/OBS MODERATE 35: CPT | Performed by: HOSPITALIST

## 2019-08-29 PROCEDURE — 25000132 ZZH RX MED GY IP 250 OP 250 PS 637: Performed by: INTERNAL MEDICINE

## 2019-08-29 PROCEDURE — 36415 COLL VENOUS BLD VENIPUNCTURE: CPT | Performed by: ORTHOPAEDIC SURGERY

## 2019-08-29 RX ORDER — ONDANSETRON 4 MG/1
4 TABLET, ORALLY DISINTEGRATING ORAL EVERY 6 HOURS PRN
Qty: 8 TABLET | Refills: 0 | DISCHARGE
Start: 2019-08-29 | End: 2019-09-17

## 2019-08-29 RX ORDER — ASPIRIN 325 MG
325 TABLET, DELAYED RELEASE (ENTERIC COATED) ORAL DAILY
Qty: 30 TABLET | Refills: 0 | Status: SHIPPED | OUTPATIENT
Start: 2019-08-30

## 2019-08-29 RX ORDER — OXYCODONE HYDROCHLORIDE 5 MG/1
2.5-5 TABLET ORAL EVERY 4 HOURS PRN
Qty: 16 TABLET | Refills: 0 | Status: SHIPPED | OUTPATIENT
Start: 2019-08-29 | End: 2019-09-10 | Stop reason: DRUGHIGH

## 2019-08-29 RX ADMIN — SODIUM CHLORIDE 500 ML: 9 INJECTION, SOLUTION INTRAVENOUS at 21:13

## 2019-08-29 RX ADMIN — SENNOSIDES AND DOCUSATE SODIUM 1 TABLET: 8.6; 5 TABLET ORAL at 08:02

## 2019-08-29 RX ADMIN — OXYCODONE HYDROCHLORIDE 2.5 MG: 5 TABLET ORAL at 08:17

## 2019-08-29 RX ADMIN — ACETAMINOPHEN 650 MG: 325 TABLET ORAL at 21:13

## 2019-08-29 RX ADMIN — ASPIRIN 325 MG: 325 TABLET, DELAYED RELEASE ORAL at 08:02

## 2019-08-29 RX ADMIN — SERTRALINE HYDROCHLORIDE 50 MG: 50 TABLET ORAL at 21:13

## 2019-08-29 RX ADMIN — LEVOTHYROXINE SODIUM 88 MCG: 88 TABLET ORAL at 08:02

## 2019-08-29 RX ADMIN — SENNOSIDES AND DOCUSATE SODIUM 1 TABLET: 8.6; 5 TABLET ORAL at 21:13

## 2019-08-29 RX ADMIN — FLUTICASONE PROPIONATE 2 SPRAY: 50 SPRAY, METERED NASAL at 08:18

## 2019-08-29 RX ADMIN — OMEPRAZOLE 40 MG: 20 CAPSULE, DELAYED RELEASE ORAL at 08:02

## 2019-08-29 RX ADMIN — RANITIDINE 150 MG: 150 TABLET ORAL at 08:02

## 2019-08-29 RX ADMIN — MELATONIN 3 MG: 3 TAB ORAL at 21:13

## 2019-08-29 RX ADMIN — RANITIDINE 150 MG: 150 TABLET ORAL at 21:13

## 2019-08-29 ASSESSMENT — ACTIVITIES OF DAILY LIVING (ADL)
ADLS_ACUITY_SCORE: 22
ADLS_ACUITY_SCORE: 23

## 2019-08-29 NOTE — PLAN OF CARE
Patient is alert, confused, and forgetful at the same time, VSS on RA, CMS intact, regular diet, up with assist of 2, and dressing CDI. Pure wick in use, Tylenol for pain control, and IV saline locked. Will continue to monitor

## 2019-08-29 NOTE — PLAN OF CARE
Discharge Planner PT   Patient plan for discharge: Not stated  Current status:  Pt performed supine to sit transfer with mod assist and increased time.  Sit to/from stand transfer with min assist.  Gait training x 15 ft using wheeled walker and min assist.  Slow, small steps.  Pt declined further gait due to fatigue.    Barriers to return to prior living situation: Current level of A, decreased tolerance to activity, falls risk   Recommendations for discharge: TCU per plan established by the PT.   Rationale for recommendations: Patient requiring min-mod assist with all mobility/ambulation. Patient would benefit from continued skilled therapy to further improve strength, balance, and independence with mobility and ambulation to address functional limitations and decrease falls risk.         Entered by: Nelda Ruelas 08/29/2019 4:59 PM

## 2019-08-29 NOTE — PLAN OF CARE
A&OX4, forgetful at times.  Up with 2 and walker.  Incontinent, purewick used.  Oxycodone X1 today before physical therapy.  Plan to discharge to TCU.

## 2019-08-29 NOTE — PROGRESS NOTES
"[facility]    Medicine Progress Note - Hospitalist Service       Date of Admission:  8/26/2019  Assessment & Plan      L hip hardware failure  S/p \"percutaneous fixation of the left fix with synthes of the femoral neck for a closed displaced fracture of the hip\" 7/1/2019. Initially did well post procedure, but then in the last month or so started to decline. States day of presentation minimally able to walk. Stable vitals on arrival in the ED. Exam largely unremarkable, hip motion not examined. WBC sl elevated at 11.9. Xray pelvis with L hip hardware failure.   S/p ORIF on 8/27  - doing well after surgery, working with PT  - prn acetaminophen, prn oxycodone, prn IV morphine for breakthrough     Leukocytosis  WBC at 11.9 on admission. Afebrile, vitals stable. No obvious infection in area of L hip. Denies urinary symptoms. Could be stress or from chronic prednisone  - slight increase, likely reactive from the steroids and surgery, no infectious symptoms, will stop monitoring unless there is clinical change     Polyarteritis nodosa  Polyneuropathy  Outpatient on imuran 150 mg daily for this. Appears to have been started on prednisone 20 mg daily on 8/13 2/2 concern that L hip pain was related to PAN (per pt). Her outpatient rheumatologist appointment was this week to discuss a taper regimen.  - can restart imuran on discharge  - given fewer than 3 weeks on prednisone no need for stress dose  - patient reports that the prednisone was prescribed because of concern for PAN causing her pain. As it has clear mechanical cause, will stop prednisone as it can impair wound healing.  - follow-up with her rheumatologist prn     Hypothyroidism  Followed by outpatient primary provider, Dr. Hanley  - Continue prior to admission levothyroxine 88 mcg daily     GERD  Continue prior to admission omeprazole 20 mg daily and ranitidine 150 mg BID     Depression  Continue prior to admission sertraline.    Diet: Advance " Diet as Tolerated: Regular Diet Adult    DVT Prophylaxis: Pneumatic Compression Devices  Rivas Catheter: not present  Code Status: DNR/DNI      Disposition Plan   Expected discharge: Tomorrow, recommended to transitional care unit once safe disposition established  Entered: Avni Cyr DO 08/29/2019, 2:09 PM       The patient's care was discussed with the Patient.    Avni Cyr DO  Hospitalist Service  [facility]    ______________________________________________________________________    Interval History   Pain is manageable at rest,   Doing well otherwise. Working with therapy    Data reviewed today: I reviewed all medications, new labs and imaging results over the last 24 hours. I personally reviewed no images or EKG's today.    Physical Exam   Vital Signs: Temp: 98.3  F (36.8  C) Temp src: Oral BP: 111/65 Pulse: 91 Heart Rate: 93 Resp: 16 SpO2: 91 % O2 Device: None (Room air)    Weight: 115 lbs 0 oz  Constitutional: alert, oriented and in no acute distress  HEENT: OP clear  Respiratory: CTA B without w/c  Cardiovascular: RRR without m/r/g  GI: soft, nontender, nondistended, no HSM  Lymph/Hematologic: no cervical LAD  Genitourinary: deferred  Skin: no rashes or lesions grossly  Musculoskeletal: no deformities or arthritis. No evidence of erythema or joint deformity over L hip area.  Psychiatric: mood and affect wnl    Data   Recent Labs   Lab 08/29/19  0721 08/28/19  0641 08/27/19  0647 08/26/19 2011   WBC 16.6*  --  12.1* 11.9*   HGB 10.4* 11.5* 11.9 12.7     --  102* 102*     --  357 368     --   --  140   POTASSIUM 3.8  --   --  4.1   CHLORIDE 109  --   --  105   CO2 30  --   --  33*   BUN 12  --   --  15   CR 0.58  --   --  0.50*   ANIONGAP 2*  --   --  2*   MICHELLE 8.7  --   --  10.0   [facility] 98 116*  --  98     No results found for this or any previous visit (from the past 24 hour(s)).  Medications       aspirin  325 mg Oral Daily     fluticasone  2 spray  Both Nostrils Daily     levothyroxine  88 mcg Oral Daily     melatonin  3 mg Oral At Bedtime     omeprazole  40 mg Oral Daily     ranitidine  150 mg Oral BID     senna-docusate  1 tablet Oral BID     sertraline  50 mg Oral At Bedtime     sodium chloride (PF)  3 mL Intracatheter Q8H     sodium chloride (PF)  3 mL Intracatheter Q8H

## 2019-08-29 NOTE — PROGRESS NOTES
ZOIE  D: Received call from [facility]-pt can be accepted at [facility] but there is a balance on her account that needs to be paid prior to her returning. ZOIE called and left  for son, Devaughn [phone] regarding balance due.   P: Will continue to follow for discharge planning    Danielle STRINGER, [facility]     ADDENDUM @3355: Received call from Devaughn-they will take care of the balance due at [facility]. Called [facility] and explained family will take care of balance and to start the auth from [facility]. ZOIE will wait to hear back from [facility] regarding auth.

## 2019-08-30 ENCOUNTER — APPOINTMENT (OUTPATIENT)
Dept: PHYSICAL THERAPY | Facility: CLINIC | Age: 84
DRG: 470 | End: 2019-08-30
Payer: COMMERCIAL

## 2019-08-30 VITALS
OXYGEN SATURATION: 95 % | HEIGHT: 65 IN | WEIGHT: 115 LBS | RESPIRATION RATE: 16 BRPM | TEMPERATURE: 97 F | BODY MASS INDEX: 19.16 KG/M2 | DIASTOLIC BLOOD PRESSURE: 64 MMHG | HEART RATE: 96 BPM | SYSTOLIC BLOOD PRESSURE: 106 MMHG

## 2019-08-30 LAB
LACTATE BLD-SCNC: 0.9 MMOL/L (ref 0.7–2)
WBC # BLD AUTO: 15.6 10E9/L (ref 4–11)

## 2019-08-30 PROCEDURE — 97116 GAIT TRAINING THERAPY: CPT | Mod: GP | Performed by: PHYSICAL THERAPIST

## 2019-08-30 PROCEDURE — 97110 THERAPEUTIC EXERCISES: CPT | Mod: GP | Performed by: PHYSICAL THERAPIST

## 2019-08-30 PROCEDURE — 25000132 ZZH RX MED GY IP 250 OP 250 PS 637: Performed by: HOSPITALIST

## 2019-08-30 PROCEDURE — 99239 HOSP IP/OBS DSCHRG MGMT >30: CPT | Performed by: HOSPITALIST

## 2019-08-30 PROCEDURE — 25000132 ZZH RX MED GY IP 250 OP 250 PS 637: Performed by: INTERNAL MEDICINE

## 2019-08-30 PROCEDURE — 83605 ASSAY OF LACTIC ACID: CPT | Performed by: PHYSICIAN ASSISTANT

## 2019-08-30 PROCEDURE — 97530 THERAPEUTIC ACTIVITIES: CPT | Mod: GP | Performed by: PHYSICAL THERAPIST

## 2019-08-30 PROCEDURE — 25000132 ZZH RX MED GY IP 250 OP 250 PS 637: Performed by: PHYSICIAN ASSISTANT

## 2019-08-30 PROCEDURE — 85048 AUTOMATED LEUKOCYTE COUNT: CPT | Performed by: PHYSICIAN ASSISTANT

## 2019-08-30 PROCEDURE — 36415 COLL VENOUS BLD VENIPUNCTURE: CPT | Performed by: PHYSICIAN ASSISTANT

## 2019-08-30 RX ORDER — OMEPRAZOLE 40 MG/1
40 CAPSULE, DELAYED RELEASE ORAL DAILY
Qty: 30 CAPSULE | Refills: 0 | Status: SHIPPED | OUTPATIENT
Start: 2019-08-31

## 2019-08-30 RX ADMIN — RANITIDINE 150 MG: 150 TABLET ORAL at 08:41

## 2019-08-30 RX ADMIN — ACETAMINOPHEN 650 MG: 325 TABLET ORAL at 08:40

## 2019-08-30 RX ADMIN — ACETAMINOPHEN 650 MG: 325 TABLET ORAL at 14:45

## 2019-08-30 RX ADMIN — SENNOSIDES AND DOCUSATE SODIUM 1 TABLET: 8.6; 5 TABLET ORAL at 08:41

## 2019-08-30 RX ADMIN — OMEPRAZOLE 40 MG: 20 CAPSULE, DELAYED RELEASE ORAL at 08:40

## 2019-08-30 RX ADMIN — FLUTICASONE PROPIONATE 2 SPRAY: 50 SPRAY, METERED NASAL at 08:48

## 2019-08-30 RX ADMIN — LEVOTHYROXINE SODIUM 88 MCG: 88 TABLET ORAL at 08:41

## 2019-08-30 RX ADMIN — ASPIRIN 325 MG: 325 TABLET, DELAYED RELEASE ORAL at 08:41

## 2019-08-30 ASSESSMENT — ACTIVITIES OF DAILY LIVING (ADL)
ADLS_ACUITY_SCORE: 21
ADLS_ACUITY_SCORE: 25
ADLS_ACUITY_SCORE: 22
ADLS_ACUITY_SCORE: 21
ADLS_ACUITY_SCORE: 21

## 2019-08-30 NOTE — PROGRESS NOTES
Orthopedic Surgery  Rayne Mabry  2019  Admit Date:  2019  POD 3 Days Post-Op  S/P Procedure(s):  LEFT HIP YUDITH ARTHROPLASTY,  WITH PROXIMAL FEMUR HARDWARE REMOVAL    Patient resting comfortably in bed.    Pain controlled.  Tolerating oral intake.    Denies nausea or vomiting  Denies chest pain or shortness of breath  No events overnight.      Alert and orient to person, place, and time.  Vital Sign Ranges  Temperature Temp  Av.8  F (36.6  C)  Min: 97.5  F (36.4  C)  Max: 98.3  F (36.8  C)   Blood pressure Systolic (24hrs), Av , Min:101 , Max:122        Diastolic (24hrs), Av, Min:60, Max:69      Pulse Pulse  Av  Min: 96  Max: 96   Respirations Resp  Av  Min: 16  Max: 16   Pulse oximetry SpO2  Av.3 %  Min: 92 %  Max: 95 %       Dressing is clean, dry, and intact.   Minimal erythema of the surrounding skin.   Bilateral calves are soft, non-tender.  Bilateral lower extremity is NVI.  Sensation intact bilateral lower extremities  5/5 motor with resisted dorsi and plantar flexion bilaterally  +Dp pulse    Labs:  Recent Labs   Lab Test 19  0721 19   POTASSIUM 3.8 4.1 4.2     Recent Labs   Lab Test 19  0721 19  0641 19  0647   HGB 10.4* 11.5* 11.9     Recent Labs   Lab Test 14  2200   INR 1.00     Recent Labs   Lab Test 19  0721 19  0647 19    357 368       A/P  1. S/p left hip hemiarthroplasty after hardware removal   Continue ASA for DVT prophylaxis.     Mobilize with PT/OT    WBAT with walker.     Continue current pain regiment.   Leave dressing intact    2. Disposition   Anticipate d/c to TCU based on medical clearance, possible today.    Mulu Andino PA-C

## 2019-08-30 NOTE — PROGRESS NOTES
[facility]    Sepsis Evaluation Progress Note    Date of Service: 08/29/2019    I was called to see Rayne Mabry due to abnormal vital signs triggering the Sepsis SIRS screening alert. She is not known to have an infection.     Physical Exam    Vital Signs:  Temp: 97.5  F (36.4  C) Temp src: Oral BP: 106/61 Pulse: 91 Heart Rate: 110 Resp: 16 SpO2: 93 % O2 Device: None (Room air)      Lab:  Lactate for Sepsis Protocol   Date Value Ref Range Status   08/29/2019 2.4 (H) 0.7 - 2.0 mmol/L Final     Comment:     Significant value called to and read back by  LETICIA MENDEZ ON 55 2030 CK         The patient is at baseline mental status.    The rest of their physical exam is significant for no changes.    Assessment and Plan    The SIRS and exam findings are likely due to steroids and Dehydration, there is no sign of sepsis at this time.    --Will give 500 cc fluid bolus now.    --Will repeat lactic acid and WBC in the morning.  --Check procalcitonin    Disposition: The patient will remain on the current unit. We will continue to monitor this patient closely.    TESSA Hameed

## 2019-08-30 NOTE — PROGRESS NOTES
ZOIE  D: Called [facility] to check on auth-they will call Otis and let ZOIE know.  P: Will continue to follow for discharge planning    ZACHARY Cruz    ADDENDUM @2245:     PAS-RR    D: Per DHS regulation, ZOIE completed and submitted PAS-RR to [place] [employer] via the Senior [employer] Line.  PAS-RR confirmation # is : [id number]    P: Further questions may be directed to Senior [employer]Age Line at #[phone], option #4 for PAS-RR staff.  Received call from Darlene nunez-discussed with Darlene about the delay in getting pt discharge. Darlene was understanding but was hopeful pt would not be here through the weekend. Discussed transportation to [facility]. Darlene would like ride set-up and is ok with estimated cost. ZOIE will wait to hear back from [facility] regarding insurance auth.    ADDENDUM @5939: Received call from [facility]-they got insurance auth so pt can admit today. ZOIE paged MD for orders. ZOIE called and set-up transport via  w/c at [id number]. ZOIE called Darlene to update on discharge plan/time. ZOIE will fax orders once they are done. Faxed scripts.      ADDENDUM @8792: Faxed orders to [facility]

## 2019-08-30 NOTE — PLAN OF CARE
Discharge Planner PT   Patient plan for discharge: TCU  Current status: Pt able to complete LE ex with A and cues, supine to sit with increased time and cues and min A, cues for prec, sit to stand with max A and FWW on second attempt, amb 20' with FWW, with min A and many standing rests, directly to commode.  Needs encouragement to participate and progress  Barriers to return to prior living situation: level of A for mob, limited functional gait distance and ronaldo for act, fall risk  Recommendations for discharge: TCU  Rationale for recommendations: Patient requiring min-mod assist with all mobility/ambulation. Patient would benefit from continued skilled therapy to further improve strength, balance, and independence with mobility and ambulation to address functional limitations and decrease falls risk.       Entered by: JERRELL ALONSO 08/30/2019 2:30 PM

## 2019-08-30 NOTE — PLAN OF CARE
Pt is A&O x4, forgetful @ times. Up w/ 1-2 assist/gb/walker. Had large BM this shift. Pain well managed w/ PRN tylenol. Applied Aquacel dressing per ortho, CDI. Incontinent of bladder, applied purewick, adequate output. Reviewed AVS w/ patient and daughter. No further questions asked. IV discontinued. Denies chest pain or SOB. [facility]

## 2019-08-30 NOTE — PLAN OF CARE
Patient is alert, but forgetful at times, VSS on RA, CMS intact, regular diet, up with assist of 2, and dressing CDI. Pure wick in use, Tylenol for pain control, and IV saline locked. 500ml NS Bolus was given due to Lactic Acid of 2.4. Will continue to monitor

## 2019-08-30 NOTE — DISCHARGE SUMMARY
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 Barger team at [facility] 2 weeks post surgery.   Call [phone] for appointment and questions.             Unresulted Labs Ordered in the Past 30 Days of this Admission     No orders found from 7/27/2019 to 8/27/2019.      These results will be followed up by none    Discharge Disposition   Discharged to rehabilitation facility  Condition at discharge: Stable    Hospital Course    L hip hardware failure s/p l hemiarthroplasty   S/p \"percutaneous fixation of the left fix with synthes of the femoral neck for a closed displaced fracture of the hip\" 7/1/2019. Initially did well post procedure, but then in the last month or so started to decline. States day of presentation minimally able to walk. Stable vitals on arrival in the ED. Exam largely unremarkable, hip motion not examined. WBC sl elevated at 11.9. Xray pelvis with L hip hardware failure.   S/p hemiarthroplasty on 8/29  - doing well after surgery, working with PT  - prn acetaminophen, prn oxycodone for pain  - aspirin for DVT ppx     Leukocytosis  WBC at 11.9 on admission. Afebrile, vitals stable. No obvious infection in area of L hip. Denies urinary symptoms. Could be stress or from chronic prednisone  - slight increase after surgery but this is improving  - repeat cbc on Monday    Lactic acidosis  Postoperatively, resolved with fluids  Likely dehydration, there was no respiratory, urinary, GI, or skin symptoms present.       Polyarteritis nodosa  Polyneuropathy  Outpatient on imuran " 150 mg daily for this. Appears to have been started on prednisone 20 mg daily on 8/13 2/2 concern that L hip pain was related to PAN (per pt). Her outpatient rheumatologist appointment was this week to discuss a taper regimen.  - can restart imuran on discharge  - given fewer than 3 weeks on prednisone no need for stress dose  - patient reports that the prednisone was prescribed because of concern for PAN causing her pain. As it has clear mechanical cause, will stop prednisone as it can impair wound healing.  - follow-up with her rheumatologist prn     Hypothyroidism  Followed by outpatient primary provider, Dr. Hanley  - Continue prior to admission levothyroxine 88 mcg daily     GERD  Continue prior to admission omeprazole 20 mg daily and ranitidine 150 mg BID     Depression  Continue prior to admission sertraline    Consultations This Hospital Stay   ORTHOPEDIC SURGERY IP CONSULT  CARE TRANSITION RN/SW IP CONSULT  OCCUPATIONAL THERAPY ADULT IP CONSULT  PHYSICAL THERAPY ADULT IP CONSULT  PHYSICAL THERAPY ADULT IP CONSULT  OCCUPATIONAL THERAPY ADULT IP CONSULT    Code Status   DNR/DNI    Time Spent on this Encounter   I, Avni Cyr DO, personally saw the patient today and spent greater than 30 minutes discharging this patient.       Avni Cyr DO  [facility]  ______________________________________________________________________    Physical Exam   Vital Signs: Temp: 98.3  F (36.8  C) Temp src: Oral BP: 101/60 Pulse: 96 Heart Rate: 104 Resp: 16 SpO2: 95 % O2 Device: None (Room air)    Weight: 115 lbs 0 oz  Constitutional: alert, oriented and in no acute distress  HEENT: OP clear  Respiratory: CTA B without w/c  Cardiovascular: RRR without m/r/g  GI: soft, nontender, nondistended, no HSM  Lymph/Hematologic: no cervical LAD  Genitourinary: deferred  Skin: no rashes or lesions grossly  Musculoskeletal: no deformities or arthritis. No evidence of erythema or joint deformity over L  hip area.  Psychiatric: mood and affect wnl       Primary Care Physician   Susan Hanley    Discharge Orders      General info for SNF    Length of Stay Estimate: Short Term Care: Estimated # of Days <30  Condition at Discharge: Improving  Level of care:skilled   Rehabilitation Potential: Good  Admission H&P remains valid and up-to-date: Yes  Recent Chemotherapy: N/A  Use Nursing Home Standing Orders: Yes     Mantoux instructions    Give two-step Mantoux (PPD) Per Facility Policy Yes     Reason for your hospital stay    Failed hardware left hip, conversion to hemiarthroplasty     Wound care (specify)    Site:   Left hip  Instructions:  Keep dressings intact, change if >60% saturated or peeling off.  Keep incision dry, able to shower over dressings if aquacel or tegaderm dressing.     Follow Up and recommended labs and tests    Follow up with Dr. Barger team at [facility] 2 weeks post surgery.   Call [phone] for appointment and questions.     Activity - Up with assistive device    Up with walker     Weight bearing status    WBAT Left hip     Physical Therapy Adult Consult    Evaluate and treat as clinically indicated.    Reason:  Hardware failure left hip, conversion to left hip hemiarthroplasty     Occupational Therapy Adult Consult    Evaluate and treat as clinically indicated.    Reason:  Hardware failure left hip, conversion to left hip hemiarthroplasty     Fall precautions     Advance Diet as Tolerated    Follow this diet upon discharge: Orders Placed This Encounter      Advance Diet as Tolerated: Regular Diet Adult       Significant Results and Procedures   Most Recent 3 CBC's:  Recent Labs   Lab Test 08/30/19  0659 08/29/19  0721 08/28/19  0641 08/27/19  0647 08/26/19 2011   WBC 15.6* 16.6*  --  12.1* 11.9*   HGB  --  10.4* 11.5* 11.9 12.7   MCV  --  100  --  102* 102*   PLT  --  314  --  357 368     Most Recent 3 BMP's:  Recent Labs   Lab Test 08/29/19  0721 08/28/19  0641 08/26/19 2011 07/11/19   NA  141  --  140 139   POTASSIUM 3.8  --  4.1 4.2   CHLORIDE 109  --  105 103   CO2 30  --  33* 26   BUN 12  --  15 16   CR 0.58  --  0.50* 0.56   ANIONGAP 2*  --  2* 10   MICHELLE 8.7  --  10.0 9.3   GLC 98 116* 98 76     Most Recent 2 LFT's:No lab results found.  Most Recent 3 INR's:  Recent Labs   Lab Test 12/11/14  2200   INR 1.00   ,   Results for orders placed or performed during the hospital encounter of 08/26/19   Lumbar spine XR, 2-3 views    Narrative    LUMBAR SPINE TWO TO THREE VIEWS    8/26/2019 8:35 PM     HISTORY: Pain, difficulty ambulating.    COMPARISON: None available. Correlation is made with previous CT the  abdomen and pelvis dated 5/16/2011.    FINDINGS: There is diffuse osseous demineralization and makes  assessment for subtle or nondisplaced fracture difficult. There is a  severe, age indeterminate compression fracture of L1. There is slight  buckling of the posterior cortex along the ventral aspect of the  spinal canal. There is also slight concavity involving the superior  endplate of L2, L3 and L4, which appears chronic in nature. There is  mild anterolisthesis of L5 on S1. There is increased dextroconvex  scoliosis of the thoracolumbar spine centered near the thoracolumbar  junction. There is a background of multilevel degenerative disc  disease and facet arthropathy, most pronounced in the lower lumbar  levels. There is moderate disc height loss at L3-4 and L4-5 and severe  disc height loss at L5-S1, with severe L5-S1 facet arthrosis.  Partially visualized right hip hemiarthroplasty changes and a screw in  the left hip, better characterized on pelvic radiographs of same date.      Impression    IMPRESSION:  1. Age-indeterminate severe L1 compression fracture.  2. Slight, chronic-appearing concavity of the superior endplates of  L2, L3 and L4.  3. Multilevel degenerative changes of the lumbar spine, most  pronounced at the lower lumbar levels.     DANIEL GUZMAN MD   XR Pelvis and Hip Left 1 View     Narrative    XR PELVIS AND HIP LEFT 1 VIEW   8/26/2019 8:30 PM     HISTORY:  recent surgery, pain    COMPARISON: 7/1/2019      Impression    IMPRESSION: Status post ORIF of the left femoral neck fracture.  Interval hardware failure/re-fracture with superior displacement of  the distal fracture fragment. Normal joint alignment maintained.  Status post right hip bipolar hemiarthroplasty. Osteopenia.  Atherosclerosis.    AJAY WILLIAM MD   XR Chest 1 View    Narrative    XR CHEST ONE VIEW   8/26/2019 8:45 PM     HISTORY: Left hip fracture. Preoperative chest x-ray.    COMPARISON: Chest x-ray 6/30/2019.      Impression    IMPRESSION: Supine view of the chest is performed. Scattered  interstitial changes appear stable compared to prior exam. Underlying  COPD is likely. No new infiltrate or lung consolidation. No  pneumothorax or pleural effusions. Heart is normal in size. Aorta  demonstrates calcified plaque and is tortuous but unchanged.  Compression fracture in the region of the thoracolumbar junction of  the spine is noted. This is unchanged compared to prior x-ray.    NIMESH CASTRO MD   XR Pelvis w Hip Port Left 1 View    Narrative    PELVIS AND HIP PORTABLE LEFT ONE VIEW 8/27/2019 6:42 PM     HISTORY: Status post left hip hemiarthroplasty biomet bipolar, with  proximal femur hardware removal.    COMPARISON: 8/26/2019      Impression    IMPRESSION: Left hip hemiarthroplasty. Radiodense material lateral to  the proximal femur, new since 8/26/2019, likely representing extruded  cement. No other evidence of complication. Postoperative soft tissue  gas and skin staples are noted. Right hip hemiarthroplasty is noted  and probably unchanged from 8/26/2019.    SEBASTIAN BACON MD       Discharge Medications   Current Discharge Medication List      START taking these medications    Details   aspirin (ASA) 325 MG EC tablet Take 1 tablet (325 mg) by mouth daily  Qty: 30 tablet, Refills: 0    Associated Diagnoses: Hip  "fracture requiring operative repair, left, closed, initial encounter (H)      ondansetron (ZOFRAN-ODT) 4 MG ODT tab Take 1 tablet (4 mg) by mouth every 6 hours as needed for nausea or vomiting  Qty: 8 tablet, Refills: 0    Associated Diagnoses: Hip fracture requiring operative repair, left, closed, initial encounter (H)      oxyCODONE (ROXICODONE) 5 MG tablet Take 0.5-1 tablets (2.5-5 mg) by mouth every 4 hours as needed 1/2 tab po q 4 hrs prn pain scale \"1-5\"  1 tab po q 4 hrs prn pain scale \"6-10\"  Qty: 16 tablet, Refills: 0    Associated Diagnoses: Hip fracture requiring operative repair, left, closed, initial encounter (H)         CONTINUE these medications which have CHANGED    Details   omeprazole (PRILOSEC) 40 MG DR capsule Take 1 capsule (40 mg) by mouth daily  Qty: 30 capsule, Refills: 0    Associated Diagnoses: Hip fracture requiring operative repair, left, closed, initial encounter (H)         CONTINUE these medications which have NOT CHANGED    Details   azaTHIOprine (IMURAN) 50 MG tablet Take 150 mg by mouth daily      calcium carbonate 500 mg, elemental, (OSCAL;OYSTER SHELL CALCIUM) 500 MG tablet Take 500 mg by mouth 2 times daily On tuesdays      ergocalciferol (ERGOCALCIFEROL) 48818 units capsule Take 50,000 Units by mouth once a week Every Tuesday      fluticasone (FLONASE) 50 MCG/ACT nasal spray Spray 2 sprays into both nostrils daily      levothyroxine (SYNTHROID/LEVOTHROID) 88 MCG tablet Take 88 mcg by mouth daily       melatonin 3 MG tablet Take 3 mg by mouth At Bedtime       senna (SENOKOT) 8.6 MG tablet Take 1 tablet by mouth daily as needed for constipation      sertraline (ZOLOFT) 50 MG tablet Take 50 mg by mouth At Bedtime       acetaminophen (TYLENOL) 325 MG tablet Take 2 tablets (650 mg) by mouth every 4 hours as needed for mild pain  Qty: 30 tablet, Refills: 0    Associated Diagnoses: Displaced fracture of left femoral neck (H)      albuterol (PROAIR HFA/PROVENTIL HFA/VENTOLIN HFA) 108 " (90 BASE) MCG/ACT Inhaler Inhale 2 puffs into the lungs every 4 hours as needed       bismuth subsalicylate (PEPTO-BISMOL) 262 MG chewable tablet Take 2 tablets by mouth every hour as needed (max 8 tablets/24 hours)         STOP taking these medications       predniSONE (DELTASONE) 5 MG tablet Comments:   Reason for Stopping:         ranitidine (ZANTAC) 150 MG tablet Comments:   Reason for Stopping:             Allergies   Allergies   Allergen Reactions     Zolpidem Other (See Comments)     Confusion even with low dose.

## 2019-08-31 NOTE — PLAN OF CARE
Physical Therapy Discharge Summary    Reason for therapy discharge:    Discharged to transitional care facility.    Progress towards therapy goal(s). See goals on Care Plan in [facility] electronic health record for goal details.  Goals not met.  Barriers to achieving goals:   discharge from facility.    Therapy recommendation(s):    Continued therapy is recommended.  Rationale/Recommendations:  cont PT at TCU to further progress strength, independence, and mobility.

## 2019-09-02 VITALS
HEART RATE: 86 BPM | BODY MASS INDEX: 18.54 KG/M2 | TEMPERATURE: 97.5 F | DIASTOLIC BLOOD PRESSURE: 64 MMHG | OXYGEN SATURATION: 91 % | SYSTOLIC BLOOD PRESSURE: 104 MMHG | RESPIRATION RATE: 18 BRPM | WEIGHT: 111.4 LBS

## 2019-09-03 ENCOUNTER — NURSING HOME VISIT (OUTPATIENT)
Dept: GERIATRICS | Facility: CLINIC | Age: 84
End: 2019-09-03
Payer: COMMERCIAL

## 2019-09-03 DIAGNOSIS — M30.0 PAN (POLYARTERITIS NODOSA) (H): ICD-10-CM

## 2019-09-03 DIAGNOSIS — I50.22 CHRONIC SYSTOLIC CONGESTIVE HEART FAILURE (H): ICD-10-CM

## 2019-09-03 DIAGNOSIS — K59.03 DRUG-INDUCED CONSTIPATION: ICD-10-CM

## 2019-09-03 DIAGNOSIS — D72.829 LEUKOCYTOSIS, UNSPECIFIED TYPE: ICD-10-CM

## 2019-09-03 DIAGNOSIS — D62 ANEMIA DUE TO BLOOD LOSS, ACUTE: ICD-10-CM

## 2019-09-03 DIAGNOSIS — R53.81 PHYSICAL DECONDITIONING: ICD-10-CM

## 2019-09-03 DIAGNOSIS — S72.002A DISPLACED FRACTURE OF LEFT FEMORAL NECK (H): Primary | ICD-10-CM

## 2019-09-03 PROCEDURE — 99310 SBSQ NF CARE HIGH MDM 45: CPT | Performed by: NURSE PRACTITIONER

## 2019-09-03 NOTE — LETTER
9/3/2019        RE: Rayne Mabry  [address]  [id number]  [facility] [place] [address]        [facility]  PRIMARY CARE PROVIDER AND CLINIC:  Susan Hanley, [facility] [address] [place] [address]  Chief Complaint   Patient presents with     Hospital F/U     [facility] Medical Record Number:  [id number]  Place of Service where encounter took place:  [facility] ([facility] [[id number]]    Rayne Mabry  is a 89 year old  (4/26/1930), PMH of polyarteritis nodosa, polyneuropathy, hypothyroidism, GERD, depression who had a Left hip Fx 7/1/19 s/p percutaneous fixation with initial improvement but now decline  admitted to the above facility from  [facility]. Hospital stay 8/26/19 through 8/30/19..  Admitted to this facility for  rehab, medical management and nursing care.    HPI:    HPI information obtained from: facility chart records, facility staff, patient report and [facility] chart review.   Brief Summary of Hospital Course:   Left hip Fx: s/p redo hemiarthroplasty on 8/29/19  Leukocytosis: WBC 11.9, could be stress response, stable during hospitalization  Lactic acidosis: improved with fluids  Polyarteritis nodosa/polyneuropathy: OP imuran 150mg QD, started on prednisone 20mg QD on 8/13/19: OP rheumatology to discuss taper plan.   Updates on Status Since Skilled nursing Admission: On exam today patient states she is feeling tired today, states pain in left hip is very little at rest, pain does increase with activity, denies fever, chills, cough, congestion, SOB, CP, palpitations, N/V/D or constipation. States she slept fair last night    CODE STATUS/ADVANCE DIRECTIVES DISCUSSION:   DNR / DNI  Patient's living condition: lives in an assisted living facility  ALLERGIES: Zolpidem  PAST MEDICAL HISTORY:  has a past medical history of Acute CHF (H), Anxiety, Arthritis, Chronic anemia, Closed left hip fracture (H), Depression, Fall (07/01/2019),  GERD (gastroesophageal reflux disease), Hypoxia, MAC (mycobacterium avium-intracellulare complex), Mild cognitive disorder, Polyarteritis nodosa (H), Polyneuropathy, Thyroid disease, and UTI (urinary tract infection) (07/04/2019).  PAST SURGICAL HISTORY:   has a past surgical history that includes GYN surgery; Closed reduction, percutaneous pinning hip (Left, 7/1/2019); Hysterectomy; hip surgery (Right); LEFT HIP FX; s/p percutaneous fixation  (07/01/2019); and Arthroplasty hip (Left, 8/27/2019).  FAMILY HISTORY: family history is not on file.  SOCIAL HISTORY:   reports that she has never smoked. She has never used smokeless tobacco. She reports that she drinks alcohol.    Post Discharge Medication Reconciliation Status: discharge medications reconciled, continue medications without change    Current Outpatient Medications   Medication Sig Dispense Refill     acetaminophen (TYLENOL) 325 MG tablet Take 2 tablets (650 mg) by mouth every 4 hours as needed for mild pain 30 tablet 0     albuterol (PROAIR HFA/PROVENTIL HFA/VENTOLIN HFA) 108 (90 BASE) MCG/ACT Inhaler Inhale 2 puffs into the lungs every 4 hours as needed        aspirin (ASA) 325 MG EC tablet Take 1 tablet (325 mg) by mouth daily 30 tablet 0     azaTHIOprine (IMURAN) 50 MG tablet Take 150 mg by mouth daily       bismuth subsalicylate (PEPTO-BISMOL) 262 MG chewable tablet Take 2 tablets by mouth every hour as needed (max 8 tablets/24 hours)       calcium carbonate 500 mg, elemental, (OSCAL;OYSTER SHELL CALCIUM) 500 MG tablet Take 500 mg by mouth 2 times daily On tuesdays       ergocalciferol (ERGOCALCIFEROL) 56846 units capsule Take 50,000 Units by mouth once a week Every Tuesday       fluticasone (FLONASE) 50 MCG/ACT nasal spray Spray 2 sprays into both nostrils daily       levothyroxine (SYNTHROID/LEVOTHROID) 88 MCG tablet Take 88 mcg by mouth daily        melatonin 3 MG tablet Take 3 mg by mouth At Bedtime        omeprazole (PRILOSEC) 40 MG DR capsule Take  "1 capsule (40 mg) by mouth daily 30 capsule 0     ondansetron (ZOFRAN-ODT) 4 MG ODT tab Take 1 tablet (4 mg) by mouth every 6 hours as needed for nausea or vomiting 8 tablet 0     oxyCODONE (ROXICODONE) 5 MG tablet Take 0.5-1 tablets (2.5-5 mg) by mouth every 4 hours as needed 1/2 tab po q 4 hrs prn pain scale \"1-5\"  1 tab po q 4 hrs prn pain scale \"6-10\" 16 tablet 0     senna (SENOKOT) 8.6 MG tablet Take 1 tablet by mouth daily as needed for constipation       sertraline (ZOLOFT) 50 MG tablet Take 50 mg by mouth At Bedtime          ROS:  10 point ROS of systems including Constitutional, Eyes, Respiratory, Cardiovascular, Gastroenterology, Genitourinary, Integumentary, Musculoskeletal, Psychiatric were all negative except for pertinent positives noted in my HPI.    Vitals:  /64   Pulse 86   Temp 97.5  F (36.4  C)   Resp 18   Wt 50.5 kg (111 lb 6.4 oz)   SpO2 91%   BMI 18.54 kg/m     Exam:  GENERAL APPEARANCE:  Alert, in no distress  ENT:  Mouth and posterior oropharynx normal, moist mucous membranes, normal hearing acuity  EYES:  EOM, conjunctivae, lids, pupils and irises normal, PERRL  RESP:  respiratory effort and palpation of chest normal, lungs clear to auscultation , no respiratory distress  CV:  Palpation and auscultation of heart done , regular rate and rhythm, no murmur, rub, or gallop, no edema  ABDOMEN:  normal bowel sounds, soft, nontender, no hepatosplenomegaly or other masses  M/S:   patient sitting upright in WC, did not visualize gait  SKIN:  Inspection of skin and subcutaneous tissue baseline, did not visualize left hip incision  NEURO:   Cranial nerves 2-12 are normal tested and grossly at patient's baseline, speech wnl  PSYCH:  affect and mood normal    Lab/Diagnostic data:  Recent labs in [facility] reviewed by me today.     ASSESSMENT/PLAN:  Displaced fracture of left femoral neck (H) s/p redo ORIF on 8/27/19 due to hardware failure   Physical deconditioning  Acute/ongoing: PT and OT for " strengthening, schedule tylenol 1000mg TID, continue oxycodone 2.5-5mg q 4 hours prn, ASA 325mg QD    PAN (polyarteritis nodosa) (H)  Ongoing: continues on Imuran 150mg QD, restart prednisone 20mg QD (per Internal medicine discharge summary), f/u with rheumatology as OP for taper dose of prednisone    Anemia due to blood loss, acute  Acute/ongoing: Hgb on wednesday, follow    Drug-induced constipation  Acute/ongoing: continue senna 1 PO QD prn    Chronic systolic congestive heart failure (H)  Ongoing: daily weights, vitals daily and prn, BMP follow  Follow off Rx    Leukocytosis, unspecified type  Acute/stress induced, CBC on wednesday       Orders written by provider at facility  CBC and BMP on wednesday  Tylenol 1000mg TID scheduled  Prednisone 20mg QD  F/u with rheumatology soon for OP eval PAN    Total time spent with patient visit at the [facility] was 45 min including patient visit and review of past records. Greater than 50% of total time spent with counseling and coordinating care due to coordinating care with nurse on admission orders, coordinating care with follow up labs and appointments and counseling patient/resident for 10 minutes on: the reason for their hospitalization and what the treatment is, the plan of SNF stay and projected length of stay, options of code status and their current code status order, current medications (treatments) reconciled from the hospital, recent past lab and imaging results and subsequent treatment plan and current pain control plan and controlled substances ordered and taper plan of care.    Electronically signed by:  Tonya Lynn Haase, APRN CNP                         Sincerely,        Tonya Lynn Haase, APRN CNP

## 2019-09-03 NOTE — PROGRESS NOTES
[place] [facility]  PRIMARY CARE PROVIDER AND CLINIC:  Susan Hanley, [facility] [address] / [facility] [address]  Chief Complaint   Patient presents with     Hospital F/U     [place] Medical Record Number:  [id number]  Place of Service where encounter took place:  [facility] ([facility]) [[id number]]    Rayne Mabry  is a 89 year old  (4/26/1930), PMH of polyarteritis nodosa, polyneuropathy, hypothyroidism, GERD, depression who had a Left hip Fx 7/1/19 s/p percutaneous fixation with initial improvement but now decline  admitted to the above facility from  [facility]. Hospital stay 8/26/19 through 8/30/19..  Admitted to this facility for  rehab, medical management and nursing care.    HPI:    HPI information obtained from: facility chart records, facility staff, patient report and [facility] chart review.   Brief Summary of Hospital Course:   Left hip Fx: s/p redo hemiarthroplasty on 8/29/19  Leukocytosis: WBC 11.9, could be stress response, stable during hospitalization  Lactic acidosis: improved with fluids  Polyarteritis nodosa/polyneuropathy: OP imuran 150mg QD, started on prednisone 20mg QD on 8/13/19: OP rheumatology to discuss taper plan.   Updates on Status Since Skilled nursing Admission: On exam today patient states she is feeling tired today, states pain in left hip is very little at rest, pain does increase with activity, denies fever, chills, cough, congestion, SOB, CP, palpitations, N/V/D or constipation. States she slept fair last night    CODE STATUS/ADVANCE DIRECTIVES DISCUSSION:   DNR / DNI  Patient's living condition: lives in an assisted living facility  ALLERGIES: Zolpidem  PAST MEDICAL HISTORY:  has a past medical history of Acute CHF (H), Anxiety, Arthritis, Chronic anemia, Closed left hip fracture (H), Depression, Fall (07/01/2019), GERD (gastroesophageal reflux disease), Hypoxia, MAC (mycobacterium avium-intracellulare complex), Mild  cognitive disorder, Polyarteritis nodosa (H), Polyneuropathy, Thyroid disease, and UTI (urinary tract infection) (07/04/2019).  PAST SURGICAL HISTORY:   has a past surgical history that includes GYN surgery; Closed reduction, percutaneous pinning hip (Left, 7/1/2019); Hysterectomy; hip surgery (Right); LEFT HIP FX; s/p percutaneous fixation  (07/01/2019); and Arthroplasty hip (Left, 8/27/2019).  FAMILY HISTORY: family history is not on file.  SOCIAL HISTORY:   reports that she has never smoked. She has never used smokeless tobacco. She reports that she drinks alcohol.    Post Discharge Medication Reconciliation Status: discharge medications reconciled, continue medications without change    Current Outpatient Medications   Medication Sig Dispense Refill     acetaminophen (TYLENOL) 325 MG tablet Take 2 tablets (650 mg) by mouth every 4 hours as needed for mild pain 30 tablet 0     albuterol (PROAIR HFA/PROVENTIL HFA/VENTOLIN HFA) 108 (90 BASE) MCG/ACT Inhaler Inhale 2 puffs into the lungs every 4 hours as needed        aspirin (ASA) 325 MG EC tablet Take 1 tablet (325 mg) by mouth daily 30 tablet 0     azaTHIOprine (IMURAN) 50 MG tablet Take 150 mg by mouth daily       bismuth subsalicylate (PEPTO-BISMOL) 262 MG chewable tablet Take 2 tablets by mouth every hour as needed (max 8 tablets/24 hours)       calcium carbonate 500 mg, elemental, (OSCAL;OYSTER SHELL CALCIUM) 500 MG tablet Take 500 mg by mouth 2 times daily On tuesdays       ergocalciferol (ERGOCALCIFEROL) 43484 units capsule Take 50,000 Units by mouth once a week Every Tuesday       fluticasone (FLONASE) 50 MCG/ACT nasal spray Spray 2 sprays into both nostrils daily       levothyroxine (SYNTHROID/LEVOTHROID) 88 MCG tablet Take 88 mcg by mouth daily        melatonin 3 MG tablet Take 3 mg by mouth At Bedtime        omeprazole (PRILOSEC) 40 MG DR capsule Take 1 capsule (40 mg) by mouth daily 30 capsule 0     ondansetron (ZOFRAN-ODT) 4 MG ODT tab Take 1 tablet  "(4 mg) by mouth every 6 hours as needed for nausea or vomiting 8 tablet 0     oxyCODONE (ROXICODONE) 5 MG tablet Take 0.5-1 tablets (2.5-5 mg) by mouth every 4 hours as needed 1/2 tab po q 4 hrs prn pain scale \"1-5\"  1 tab po q 4 hrs prn pain scale \"6-10\" 16 tablet 0     senna (SENOKOT) 8.6 MG tablet Take 1 tablet by mouth daily as needed for constipation       sertraline (ZOLOFT) 50 MG tablet Take 50 mg by mouth At Bedtime          ROS:  10 point ROS of systems including Constitutional, Eyes, Respiratory, Cardiovascular, Gastroenterology, Genitourinary, Integumentary, Musculoskeletal, Psychiatric were all negative except for pertinent positives noted in my HPI.    Vitals:  /64   Pulse 86   Temp 97.5  F (36.4  C)   Resp 18   Wt 50.5 kg (111 lb 6.4 oz)   SpO2 91%   BMI 18.54 kg/m    Exam:  GENERAL APPEARANCE:  Alert, in no distress  ENT:  Mouth and posterior oropharynx normal, moist mucous membranes, normal hearing acuity  EYES:  EOM, conjunctivae, lids, pupils and irises normal, PERRL  RESP:  respiratory effort and palpation of chest normal, lungs clear to auscultation , no respiratory distress  CV:  Palpation and auscultation of heart done , regular rate and rhythm, no murmur, rub, or gallop, no edema  ABDOMEN:  normal bowel sounds, soft, nontender, no hepatosplenomegaly or other masses  M/S:   patient sitting upright in WC, did not visualize gait  SKIN:  Inspection of skin and subcutaneous tissue baseline, did not visualize left hip incision  NEURO:   Cranial nerves 2-12 are normal tested and grossly at patient's baseline, speech wnl  PSYCH:  affect and mood normal    Lab/Diagnostic data:  Recent labs in [facility] reviewed by me today.     ASSESSMENT/PLAN:  Displaced fracture of left femoral neck (H) s/p redo ORIF on 8/27/19 due to hardware failure   Physical deconditioning  Acute/ongoing: PT and OT for strengthening, schedule tylenol 1000mg TID, continue oxycodone 2.5-5mg q 4 hours prn, ASA 325mg QD    PAN " (polyarteritis nodosa) (H)  Ongoing: continues on Imuran 150mg QD, restart prednisone 20mg QD (per Internal medicine discharge summary), f/u with rheumatology as OP for taper dose of prednisone    Anemia due to blood loss, acute  Acute/ongoing: Hgb on wednesday, follow    Drug-induced constipation  Acute/ongoing: continue senna 1 PO QD prn    Chronic systolic congestive heart failure (H)  Ongoing: daily weights, vitals daily and prn, BMP follow  Follow off Rx    Leukocytosis, unspecified type  Acute/stress induced, CBC on wednesday       Orders written by provider at facility  CBC and BMP on wednesday  Tylenol 1000mg TID scheduled  Prednisone 20mg QD  F/u with rheumatology soon for OP eval PAN    Total time spent with patient visit at the skilled nursing facility was 45 min including patient visit and review of past records. Greater than 50% of total time spent with counseling and coordinating care due to coordinating care with nurse on admission orders, coordinating care with follow up labs and appointments and counseling patient/resident for 10 minutes on: the reason for their hospitalization and what the treatment is, the plan of SNF stay and projected length of stay, options of code status and their current code status order, current medications (treatments) reconciled from the hospital, recent past lab and imaging results and subsequent treatment plan and current pain control plan and controlled substances ordered and taper plan of care.    Electronically signed by:  Tonya Lynn Haase, APRN CNP

## 2019-09-05 ENCOUNTER — TRANSFERRED RECORDS (OUTPATIENT)
Dept: HEALTH INFORMATION MANAGEMENT | Facility: CLINIC | Age: 84
End: 2019-09-05

## 2019-09-05 LAB
ANION GAP SERPL CALCULATED.3IONS-SCNC: 10 MMOL/L (ref 7–16)
BUN SERPL-MCNC: 15 MG/DL (ref 7–26)
CALCIUM SERPL-MCNC: 9.9 MG/DL (ref 8.4–10.4)
CHLORIDE SERPLBLD-SCNC: 103 MMOL/L (ref 98–109)
CO2 SERPL-SCNC: 28 MMOL/L (ref 20–29)
CREAT SERPL-MCNC: 0.58 MG/DL (ref 0.55–1.02)
ERYTHROCYTE [DISTWIDTH] IN BLOOD BY AUTOMATED COUNT: 14.5 % (ref 11.9–15.5)
GFR SERPL CREATININE-BSD FRML MDRD: >60 ML/MIN/1.73M2
GLUCOSE SERPL-MCNC: 96 MG/DL (ref 70–100)
HCT VFR BLD AUTO: 28.4 % (ref 34.9–44.5)
HEMOGLOBIN: 8.7 G/DL (ref 12–15.5)
MCH RBC QN AUTO: 32 PG (ref 27.6–33.3)
MCHC RBC AUTO-ENTMCNC: 30.6 G/DL (ref 31.5–35.2)
MCV RBC AUTO: 104.4 FL (ref 80–100)
PLATELET # BLD AUTO: 513 X10(9)/L (ref 150–450)
POTASSIUM SERPL-SCNC: 3.4 MMOL/L (ref 3.5–5.1)
RBC # BLD AUTO: 2.72 X10(12)/L (ref 3.9–5.03)
SODIUM SERPL-SCNC: 141 MMOL/L (ref 136–145)
WBC # BLD AUTO: 10.4 X10(9)/L (ref 3.5–10.5)

## 2019-09-06 ENCOUNTER — TELEPHONE (OUTPATIENT)
Dept: GERIATRICS | Facility: CLINIC | Age: 84
End: 2019-09-06

## 2019-09-07 NOTE — TELEPHONE ENCOUNTER
Paged re: Hgb lab result. Aliya had recent hip surgery. Hgb two days ago was 9.7 and today 8.7. She is asymptomatic, VSS and no signs of bleeding from surgical site or elsewhere. Plan to continue to monitor during her recovery and have primary NP f/u on this next week.    Mulu Victoria, DO

## 2019-09-09 VITALS
WEIGHT: 108.5 LBS | DIASTOLIC BLOOD PRESSURE: 60 MMHG | RESPIRATION RATE: 18 BRPM | BODY MASS INDEX: 18.06 KG/M2 | SYSTOLIC BLOOD PRESSURE: 102 MMHG | HEART RATE: 89 BPM | TEMPERATURE: 97.4 F | OXYGEN SATURATION: 97 %

## 2019-09-09 RX ORDER — OXYCODONE HYDROCHLORIDE 5 MG/1
TABLET ORAL
COMMUNITY
End: 2019-09-17

## 2019-09-09 RX ORDER — ACETAMINOPHEN 500 MG
1000 TABLET ORAL 3 TIMES DAILY
COMMUNITY

## 2019-09-10 ENCOUNTER — NURSING HOME VISIT (OUTPATIENT)
Dept: GERIATRICS | Facility: CLINIC | Age: 84
End: 2019-09-10
Payer: COMMERCIAL

## 2019-09-10 DIAGNOSIS — I50.22 CHRONIC SYSTOLIC CONGESTIVE HEART FAILURE (H): ICD-10-CM

## 2019-09-10 DIAGNOSIS — R53.81 PHYSICAL DECONDITIONING: ICD-10-CM

## 2019-09-10 DIAGNOSIS — K21.9 GASTROESOPHAGEAL REFLUX DISEASE WITHOUT ESOPHAGITIS: ICD-10-CM

## 2019-09-10 DIAGNOSIS — R41.89 COGNITIVE IMPAIRMENT: ICD-10-CM

## 2019-09-10 DIAGNOSIS — D62 ANEMIA DUE TO BLOOD LOSS, ACUTE: ICD-10-CM

## 2019-09-10 DIAGNOSIS — S72.002A DISPLACED FRACTURE OF LEFT FEMORAL NECK (H): Primary | ICD-10-CM

## 2019-09-10 DIAGNOSIS — K59.03 DRUG-INDUCED CONSTIPATION: ICD-10-CM

## 2019-09-10 DIAGNOSIS — M30.0 PAN (POLYARTERITIS NODOSA) (H): ICD-10-CM

## 2019-09-10 PROCEDURE — 99310 SBSQ NF CARE HIGH MDM 45: CPT | Performed by: NURSE PRACTITIONER

## 2019-09-10 NOTE — PROGRESS NOTES
"[place] [facility]  [place] Medical Record Number:  [id number]  Place of Service where encounter took place:  [facility] ([facility]) [[id number]]  Chief Complaint   Patient presents with     RECHECK       HPI:    Rayne Mabry  is a 89 year old (4/26/1930), who is being seen today for an episodic care visit.  HPI information obtained from: facility chart records, facility staff, patient report and [facility] chart review. Today's concern is:  Left hip Fx: On exam today patient states left hip pain is \"much better\" she is working with therapy, walking up to 200 feet using a RW with SBA, lives at [facility]  VALDEZ: ongoing, f/u with rheumatology  Anemia: see labs  Cognitive impairement: BIMS 13/15 and SBT of 12/28  CHF: weight on admit 113.1lbs and todays weight 108.8lbs, vitals stable 112/67, 102/60, 121/71 with HR 70-80 range.   Constipation: states bowels are working well    Past Medical and Surgical History reviewed in Epic today.    MEDICATIONS:  Current Outpatient Medications   Medication Sig Dispense Refill     acetaminophen (TYLENOL) 500 MG tablet Take 1,000 mg by mouth 3 times daily       albuterol (PROAIR HFA/PROVENTIL HFA/VENTOLIN HFA) 108 (90 BASE) MCG/ACT Inhaler Inhale 2 puffs into the lungs every 4 hours as needed        aspirin (ASA) 325 MG EC tablet Take 1 tablet (325 mg) by mouth daily 30 tablet 0     azaTHIOprine (IMURAN) 50 MG tablet Take 150 mg by mouth daily       bismuth subsalicylate (PEPTO-BISMOL) 262 MG chewable tablet Take 2 tablets by mouth every hour as needed (max 8 tablets/24 hours)       calcium carbonate 500 mg, elemental, (OSCAL;OYSTER SHELL CALCIUM) 500 MG tablet Take 500 mg by mouth 2 times daily On tuesdays       ergocalciferol (ERGOCALCIFEROL) 19302 units capsule Take 50,000 Units by mouth once a week Every Tuesday       fluticasone (FLONASE) 50 MCG/ACT nasal spray Spray 2 sprays into both nostrils daily       levothyroxine (SYNTHROID/LEVOTHROID) 88 MCG tablet Take " "88 mcg by mouth daily        melatonin 3 MG tablet Take 3 mg by mouth At Bedtime        omeprazole (PRILOSEC) 40 MG DR capsule Take 1 capsule (40 mg) by mouth daily 30 capsule 0     ondansetron (ZOFRAN-ODT) 4 MG ODT tab Take 1 tablet (4 mg) by mouth every 6 hours as needed for nausea or vomiting 8 tablet 0     oxyCODONE (ROXICODONE) 5 MG tablet Give 0.5 tablet by mouth every 4 hours as needed for pain 3-6/10 AND Give 1 tablet by mouth every 4 hours as needed for pain 7-10/10       senna (SENOKOT) 8.6 MG tablet Take 1 tablet by mouth daily as needed for constipation       sertraline (ZOLOFT) 50 MG tablet Take 50 mg by mouth At Bedtime        acetaminophen (TYLENOL) 325 MG tablet Take 2 tablets (650 mg) by mouth every 4 hours as needed for mild pain (Patient not taking: Reported on 9/9/2019) 30 tablet 0     oxyCODONE (ROXICODONE) 5 MG tablet Take 0.5-1 tablets (2.5-5 mg) by mouth every 4 hours as needed 1/2 tab po q 4 hrs prn pain scale \"1-5\"  1 tab po q 4 hrs prn pain scale \"6-10\" (Patient not taking: Reported on 9/9/2019) 16 tablet 0         REVIEW OF SYSTEMS:  10 point ROS of systems including Constitutional, Eyes, Respiratory, Cardiovascular, Gastroenterology, Genitourinary, Integumentary, Musculoskeletal, Psychiatric were all negative except for pertinent positives noted in my HPI.    Objective:  /60   Pulse 89   Temp 97.4  F (36.3  C)   Resp 18   Wt 49.2 kg (108 lb 8 oz)   SpO2 97%   BMI 18.06 kg/m    Exam:  Exam:  GENERAL APPEARANCE:  Alert, in no distress  ENT:  Mouth and posterior oropharynx normal, moist mucous membranes, normal hearing acuity  EYES:  EOM, conjunctivae, lids, pupils and irises normal, PERRL  RESP:  respiratory effort and palpation of chest normal, lungs clear to auscultation , no respiratory distress  CV:  Palpation and auscultation of heart done , regular rate and rhythm, no murmur, rub, or gallop, no edema  ABDOMEN:  normal bowel sounds, soft, nontender, no hepatosplenomegaly " or other masses  M/S:   patient sitting upright in WC, did not visualize gait  SKIN:  Inspection of skin and subcutaneous tissue baseline, did not visualize left hip incision  NEURO:   Cranial nerves 2-12 are normal tested and grossly at patient's baseline, speech wnl  PSYCH:  affect and mood normal    Labs:   Recent labs in [facility] reviewed by me today.     ASSESSMENT/PLAN:  ASSESSMENT/PLAN:  Displaced fracture of left femoral neck (H) s/p redo ORIF on 8/27/19 due to hardware failure   Physical deconditioning  Acute/ongoing: PT and OT for strengthening, schedule tylenol 1000mg TID, continue oxycodone 2.5-5mg q 4 hours prn, ASA 325mg QD     PAN (polyarteritis nodosa) (H)  Ongoing: continues on Imuran 150mg QD, restart prednisone 20mg QD (per Internal medicine discharge summary), f/u with rheumatology as OP   Off prednisone     Anemia due to blood loss, acute  Acute/ongoing: Hgb follow     Drug-induced constipation  Acute/ongoing: continue senna 1 PO QD prn     Chronic systolic congestive heart failure (H)  Ongoing: daily weights, vitals daily and prn, BMP follow  Follow off Rx     Leukocytosis, unspecified type  Acute/stress induced, WBC 10.4 wnl at this time    Orders written by provider at facility  No new orders    Total time spent with patient visit at the skilled nursing facility was 45 min including patient visit and review of past records. Greater than 50% of total time spent with counseling and coordinating care due to coordinating care with nurse on admission orders, coordinating care with follow up labs and appointments and counseling patient/resident for 10 minutes on: the plan of SNF stay and projected length of stay, current medications (treatments) reconciled from the hospital, recent past lab and imaging results and subsequent treatment plan and current pain control plan and controlled substances ordered and taper plan of care.  Electronically signed by:  Tonya Lynn Haase, APRN CNP

## 2019-09-10 NOTE — LETTER
"    9/10/2019        RE: Rayne Mabry  [address]  [id number]  [facility] [address]        [place] [facility]  [place] Medical Record Number:  [id number]  Place of Service where encounter took place:  [facility] ([facility]) [[id number]]  Chief Complaint   Patient presents with     RECHECK       HPI:    Rayne Mabry  is a 89 year old (4/26/1930), who is being seen today for an episodic care visit.  HPI information obtained from: facility chart records, facility staff, patient report and [facility] chart review. Today's concern is:  Left hip Fx: On exam today patient states left hip pain is \"much better\" she is working with therapy, walking up to 200 feet using a RW with [facility], lives at [facility]  VALDEZ: ongoing, f/u with rheumatology  Anemia: see labs  Cognitive impairement: BIMS 13/15 and SBT of 12/28  CHF: weight on admit 113.1lbs and todays weight 108.8lbs, vitals stable 112/67, 102/60, 121/71 with HR 70-80 range.   Constipation: states bowels are working well    Past Medical and Surgical History reviewed in Epic today.    MEDICATIONS:  Current Outpatient Medications   Medication Sig Dispense Refill     acetaminophen (TYLENOL) 500 MG tablet Take 1,000 mg by mouth 3 times daily       albuterol (PROAIR HFA/PROVENTIL HFA/VENTOLIN HFA) 108 (90 BASE) MCG/ACT Inhaler Inhale 2 puffs into the lungs every 4 hours as needed        aspirin (ASA) 325 MG EC tablet Take 1 tablet (325 mg) by mouth daily 30 tablet 0     azaTHIOprine (IMURAN) 50 MG tablet Take 150 mg by mouth daily       bismuth subsalicylate (PEPTO-BISMOL) 262 MG chewable tablet Take 2 tablets by mouth every hour as needed (max 8 tablets/24 hours)       calcium carbonate 500 mg, elemental, (OSCAL;OYSTER SHELL CALCIUM) 500 MG tablet Take 500 mg by mouth 2 times daily On tuesdays       ergocalciferol (ERGOCALCIFEROL) 43486 units capsule Take 50,000 Units by mouth once a week Every Tuesday       fluticasone (FLONASE) 50 MCG/ACT nasal " "spray Spray 2 sprays into both nostrils daily       levothyroxine (SYNTHROID/LEVOTHROID) 88 MCG tablet Take 88 mcg by mouth daily        melatonin 3 MG tablet Take 3 mg by mouth At Bedtime        omeprazole (PRILOSEC) 40 MG DR capsule Take 1 capsule (40 mg) by mouth daily 30 capsule 0     ondansetron (ZOFRAN-ODT) 4 MG ODT tab Take 1 tablet (4 mg) by mouth every 6 hours as needed for nausea or vomiting 8 tablet 0     oxyCODONE (ROXICODONE) 5 MG tablet Give 0.5 tablet by mouth every 4 hours as needed for pain 3-6/10 AND Give 1 tablet by mouth every 4 hours as needed for pain 7-10/10       senna (SENOKOT) 8.6 MG tablet Take 1 tablet by mouth daily as needed for constipation       sertraline (ZOLOFT) 50 MG tablet Take 50 mg by mouth At Bedtime        acetaminophen (TYLENOL) 325 MG tablet Take 2 tablets (650 mg) by mouth every 4 hours as needed for mild pain (Patient not taking: Reported on 9/9/2019) 30 tablet 0     oxyCODONE (ROXICODONE) 5 MG tablet Take 0.5-1 tablets (2.5-5 mg) by mouth every 4 hours as needed 1/2 tab po q 4 hrs prn pain scale \"1-5\"  1 tab po q 4 hrs prn pain scale \"6-10\" (Patient not taking: Reported on 9/9/2019) 16 tablet 0         REVIEW OF SYSTEMS:  10 point ROS of systems including Constitutional, Eyes, Respiratory, Cardiovascular, Gastroenterology, Genitourinary, Integumentary, Musculoskeletal, Psychiatric were all negative except for pertinent positives noted in my HPI.    Objective:  /60   Pulse 89   Temp 97.4  F (36.3  C)   Resp 18   Wt 49.2 kg (108 lb 8 oz)   SpO2 97%   BMI 18.06 kg/m     Exam:  Exam:  GENERAL APPEARANCE:  Alert, in no distress  ENT:  Mouth and posterior oropharynx normal, moist mucous membranes, normal hearing acuity  EYES:  EOM, conjunctivae, lids, pupils and irises normal, PERRL  RESP:  respiratory effort and palpation of chest normal, lungs clear to auscultation , no respiratory distress  CV:  Palpation and auscultation of heart done , regular rate and rhythm, " no murmur, rub, or gallop, no edema  ABDOMEN:  normal bowel sounds, soft, nontender, no hepatosplenomegaly or other masses  M/S:   patient sitting upright in WC, did not visualize gait  SKIN:  Inspection of skin and subcutaneous tissue baseline, did not visualize left hip incision  NEURO:   Cranial nerves 2-12 are normal tested and grossly at patient's baseline, speech wnl  PSYCH:  affect and mood normal    Labs:   Recent labs in [facility] reviewed by me today.     ASSESSMENT/PLAN:  ASSESSMENT/PLAN:  Displaced fracture of left femoral neck (H) s/p redo ORIF on 8/27/19 due to hardware failure   Physical deconditioning  Acute/ongoing: PT and OT for strengthening, schedule tylenol 1000mg TID, continue oxycodone 2.5-5mg q 4 hours prn, ASA 325mg QD     PAN (polyarteritis nodosa) (H)  Ongoing: continues on Imuran 150mg QD, restart prednisone 20mg QD (per Internal medicine discharge summary), f/u with rheumatology as OP   Off prednisone     Anemia due to blood loss, acute  Acute/ongoing: Hgb follow     Drug-induced constipation  Acute/ongoing: continue senna 1 PO QD prn     Chronic systolic congestive heart failure (H)  Ongoing: daily weights, vitals daily and prn, BMP follow  Follow off Rx     Leukocytosis, unspecified type  Acute/stress induced, WBC 10.4 wnl at this time    Orders written by provider at facility  No new orders    Total time spent with patient visit at the skilled nursing facility was 45 min including patient visit and review of past records. Greater than 50% of total time spent with counseling and coordinating care due to coordinating care with nurse on admission orders, coordinating care with follow up labs and appointments and counseling patient/resident for 10 minutes on: the plan of SNF stay and projected length of stay, current medications (treatments) reconciled from the hospital, recent past lab and imaging results and subsequent treatment plan and current pain control plan and controlled substances  ordered and taper plan of care.  Electronically signed by:  Tonya Lynn Haase, APRN CNP                 Sincerely,        Tonya Lynn Haase, APRN CNP     Stable

## 2019-09-14 ENCOUNTER — NURSING HOME VISIT (OUTPATIENT)
Dept: GERIATRICS | Facility: CLINIC | Age: 84
End: 2019-09-14
Payer: COMMERCIAL

## 2019-09-14 DIAGNOSIS — D62 ANEMIA DUE TO BLOOD LOSS, ACUTE: ICD-10-CM

## 2019-09-14 DIAGNOSIS — S72.002A DISPLACED FRACTURE OF LEFT FEMORAL NECK (H): Primary | ICD-10-CM

## 2019-09-14 DIAGNOSIS — M30.0 PAN (POLYARTERITIS NODOSA) (H): ICD-10-CM

## 2019-09-14 PROCEDURE — 99304 1ST NF CARE SF/LOW MDM 25: CPT | Performed by: INTERNAL MEDICINE

## 2019-09-15 NOTE — PROGRESS NOTES
[place] [facility]  PRIMARY CARE PROVIDER AND CLINIC:  Susan Hanley, [facility] [address] [facility] [address] [facility] [address]        Patient was seen by Dr. Moses at the [facility] for a hospital follow-up visit on September 14, 2019    Patient is a 89 year old  (4/26/1930), PMH of polyarteritis nodosa, polyneuropathy, status post left hip fracture repair in early July of this year, who was hospitalized at [facility] from August 26, 2019 through August 30, 2019 after undergoing a left hip hemiarthroplasty with proximal femur hardware removal.    Postoperative course was medically unremarkable.    History of polyarteritis nodosum for which the patient had been started on prednisone by her rheumatologist prior to surgery with concern that exacerbation of this might have been causing her hip pain.  Prednisone was discontinued during this hospitalization.    Patient reports gradually improving left hip pain.  She is ambulating therapy and hopes to discharge within the next week.  She has been sleeping poorly but does not think this is necessarily from pain.  She denies cough, chest pain, shortness of breath, dysuria.  Bowel movements have been regular      CODE STATUS/ADVANCE DIRECTIVES DISCUSSION:   DNR / DNI  Patient's living condition: lives in an assisted living facility  ALLERGIES: Zolpidem  PAST MEDICAL HISTORY:  has a past medical history of Acute CHF (H), Anxiety, Arthritis, Chronic anemia, Closed left hip fracture (H), Depression, Fall (07/01/2019), GERD (gastroesophageal reflux disease), Hypoxia, MAC (mycobacterium avium-intracellulare complex), Mild cognitive disorder, Polyarteritis nodosa (H), Polyneuropathy, Thyroid disease, and UTI (urinary tract infection) (07/04/2019).  PAST SURGICAL HISTORY:   has a past surgical history that includes GYN surgery; Closed reduction, percutaneous pinning hip (Left, 7/1/2019); Hysterectomy; hip surgery (Right); LEFT HIP FX; s/p  percutaneous fixation  (07/01/2019); and Arthroplasty hip (Left, 8/27/2019).  FAMILY HISTORY: family history is not on file.  SOCIAL HISTORY:   reports that she has never smoked. She has never used smokeless tobacco. She reports that she drinks alcohol.      Current Outpatient Medications   Medication Sig Dispense Refill     acetaminophen (TYLENOL) 500 MG tablet Take 1,000 mg by mouth 3 times daily       albuterol (PROAIR HFA/PROVENTIL HFA/VENTOLIN HFA) 108 (90 BASE) MCG/ACT Inhaler Inhale 2 puffs into the lungs every 4 hours as needed        aspirin (ASA) 325 MG EC tablet Take 1 tablet (325 mg) by mouth daily 30 tablet 0     azaTHIOprine (IMURAN) 50 MG tablet Take 150 mg by mouth daily       bismuth subsalicylate (PEPTO-BISMOL) 262 MG chewable tablet Take 2 tablets by mouth every hour as needed (max 8 tablets/24 hours)       calcium carbonate 500 mg, elemental, (OSCAL;OYSTER SHELL CALCIUM) 500 MG tablet Take 500 mg by mouth 2 times daily On tuesdays       ergocalciferol (ERGOCALCIFEROL) 35802 units capsule Take 50,000 Units by mouth once a week Every Tuesday       fluticasone (FLONASE) 50 MCG/ACT nasal spray Spray 2 sprays into both nostrils daily       levothyroxine (SYNTHROID/LEVOTHROID) 88 MCG tablet Take 88 mcg by mouth daily        melatonin 3 MG tablet Take 3 mg by mouth At Bedtime        omeprazole (PRILOSEC) 40 MG DR capsule Take 1 capsule (40 mg) by mouth daily 30 capsule 0     ondansetron (ZOFRAN-ODT) 4 MG ODT tab Take 1 tablet (4 mg) by mouth every 6 hours as needed for nausea or vomiting 8 tablet 0     oxyCODONE (ROXICODONE) 5 MG tablet Give 0.5 tablet by mouth every 4 hours as needed for pain 3-6/10 AND Give 1 tablet by mouth every 4 hours as needed for pain 7-10/10       senna (SENOKOT) 8.6 MG tablet Take 1 tablet by mouth daily as needed for constipation       sertraline (ZOLOFT) 50 MG tablet Take 50 mg by mouth At Bedtime          ROS:  10 point ROS negative except as noted  above        Exam:  GENERAL APPEARANCE:  Alert, in no distress, lying in bed  ENT: Oral mucosa moist  EYES: No eye redness or drainage  RESP: Lungs clear  CV: Regular rate and rhythm  ABDOMEN: Soft, nontender  M/S: Left hip incision was not examined.  There is mild swelling left thigh.  There is no left calf swelling or   tenderness.  NEURO:   Alert, fully oriented, pleasant.  Face symmetric.        ASSESSMENT/PLAN:    Displaced fracture of left femoral neck (H) s/p hemiarthroplasty.    Patient is doing well.  Pain has been controlled.    Plan: Continue therapies.  Continue Tylenol and low-dose oxycodone for pain.  Aspirin for DVT prophylaxis.  Bowel regimen      PAN (polyarteritis nodosa) (H)  Ongoing:  Plan continue Imuran 150mg QD,    Anemia due to blood loss, acute  Asymptomatic.  Plan: Monitor hemoglobin, symptoms    Chronic systolic congestive heart failure (H)  Stable.  On no medications.  Plan: Monitor weight, exam, vitals  .      Jeovanny Moses MD

## 2019-09-17 ENCOUNTER — DISCHARGE SUMMARY NURSING HOME (OUTPATIENT)
Dept: GERIATRICS | Facility: CLINIC | Age: 84
End: 2019-09-17
Payer: COMMERCIAL

## 2019-09-17 VITALS
TEMPERATURE: 97.3 F | RESPIRATION RATE: 16 BRPM | HEART RATE: 92 BPM | WEIGHT: 106 LBS | DIASTOLIC BLOOD PRESSURE: 71 MMHG | BODY MASS INDEX: 17.64 KG/M2 | OXYGEN SATURATION: 95 % | SYSTOLIC BLOOD PRESSURE: 104 MMHG

## 2019-09-17 DIAGNOSIS — D72.829 LEUKOCYTOSIS, UNSPECIFIED TYPE: ICD-10-CM

## 2019-09-17 DIAGNOSIS — K59.03 DRUG-INDUCED CONSTIPATION: ICD-10-CM

## 2019-09-17 DIAGNOSIS — M30.0 PAN (POLYARTERITIS NODOSA) (H): ICD-10-CM

## 2019-09-17 DIAGNOSIS — R53.81 PHYSICAL DECONDITIONING: ICD-10-CM

## 2019-09-17 DIAGNOSIS — I50.22 CHRONIC SYSTOLIC CONGESTIVE HEART FAILURE (H): ICD-10-CM

## 2019-09-17 DIAGNOSIS — D62 ANEMIA DUE TO BLOOD LOSS, ACUTE: ICD-10-CM

## 2019-09-17 DIAGNOSIS — R41.89 COGNITIVE IMPAIRMENT: ICD-10-CM

## 2019-09-17 DIAGNOSIS — S72.002A DISPLACED FRACTURE OF LEFT FEMORAL NECK (H): Primary | ICD-10-CM

## 2019-09-17 PROCEDURE — 99316 NF DSCHRG MGMT 30 MIN+: CPT | Performed by: NURSE PRACTITIONER

## 2019-09-17 NOTE — PROGRESS NOTES
[place] [facility] DISCHARGE SUMMARY  PATIENT'S NAME: Rayne Mabry  YOB: 1930  MEDICAL RECORD NUMBER:  [id number]  Place of Service where encounter took place:  [facility] ([facility] [[id number]]    PRIMARY CARE PROVIDER AND CLINIC RESPONSIBLE AFTER TRANSFER:   Susan Hanley [facility] [facility] [address] / [place] [address]    [facility] Provider     Transferring providers: Tonya Lynn Haase, APRN CNP, Dr. Aurelia MD  Recent Hospitalization/ED:  [facility] stay 8/26/19 to 8/30/19.  Date of SNF Admission: August / 30 / 2019  Date of SNF (anticipated) Discharge: September / 19 / 2019  Discharged to: previous [facility] with increased services  Cognitive Scores: BIMS 13/15, SBT 12/28  Physical Function: Ambulating 300 ft with RW indep  DME: Walker    CODE STATUS/ADVANCE DIRECTIVES DISCUSSION:  DNR   ALLERGIES: Zolpidem    DISCHARGE DIAGNOSIS/NURSING FACILITY COURSE:   Patient progressed in therapy to walking 300 feet with RW indep, indep to minimal assist with ADL's, cognitive assessment CPT 4.4/5.6 patient lives in [place] will DC to [facility] with increased services for AM and PM cares and assist to walk to meals. Patient will DC home with home PT, OT, HHA through [facility].     Past Medical History:  has a past medical history of Acute CHF (H), Anxiety, Arthritis, Chronic anemia, Closed left hip fracture (H), Depression, Fall (07/01/2019), GERD (gastroesophageal reflux disease), Hypoxia, MAC (mycobacterium avium-intracellulare complex), Mild cognitive disorder, Polyarteritis nodosa (H), Polyneuropathy, Thyroid disease, and UTI (urinary tract infection) (07/04/2019).    Discharge Medications:  Current Outpatient Medications   Medication Sig Dispense Refill     acetaminophen (TYLENOL) 500 MG tablet Take 1,000 mg by mouth 3 times daily       albuterol (PROAIR HFA/PROVENTIL HFA/VENTOLIN HFA) 108 (90 BASE) MCG/ACT Inhaler Inhale 2 puffs into the lungs every 4 hours as needed         aspirin (ASA) 325 MG EC tablet Take 1 tablet (325 mg) by mouth daily 30 tablet 0     azaTHIOprine (IMURAN) 50 MG tablet Take 150 mg by mouth daily       bismuth subsalicylate (PEPTO-BISMOL) 262 MG chewable tablet Take 2 tablets by mouth every hour as needed (max 8 tablets/24 hours)       calcium carbonate 500 mg, elemental, (OSCAL;OYSTER SHELL CALCIUM) 500 MG tablet Take 500 mg by mouth 2 times daily On tuesdays       ergocalciferol (ERGOCALCIFEROL) 18681 units capsule Take 50,000 Units by mouth once a week Every Tuesday       fluticasone (FLONASE) 50 MCG/ACT nasal spray Spray 2 sprays into both nostrils daily       levothyroxine (SYNTHROID/LEVOTHROID) 88 MCG tablet Take 88 mcg by mouth daily        melatonin 3 MG tablet Take 3 mg by mouth At Bedtime        omeprazole (PRILOSEC) 40 MG DR capsule Take 1 capsule (40 mg) by mouth daily 30 capsule 0     ondansetron (ZOFRAN-ODT) 4 MG ODT tab Take 1 tablet (4 mg) by mouth every 6 hours as needed for nausea or vomiting 8 tablet 0     oxyCODONE (ROXICODONE) 5 MG tablet Give 0.5 tablet by mouth every 4 hours as needed for pain 3-6/10 AND Give 1 tablet by mouth every 4 hours as needed for pain 7-10/10       senna (SENOKOT) 8.6 MG tablet Take 1 tablet by mouth daily as needed for constipation       sertraline (ZOLOFT) 50 MG tablet Take 50 mg by mouth At Bedtime          Medication Changes/Rationale:     DC prednisone after hospitalization with no concerns    Controlled medications sent with patient:   not applicable/none     ROS:   10 point ROS of systems including Constitutional, Eyes, Respiratory, Cardiovascular, Gastroenterology, Genitourinary, Integumentary, Musculoskeletal, Psychiatric were all negative except for pertinent positives noted in my HPI.    Physical Exam:   Vitals: /71   Pulse 92   Temp 97.3  F (36.3  C)   Resp 16   Wt 48.1 kg (106 lb)   SpO2 95%   BMI 17.64 kg/m    BMI= Body mass index is 17.64 kg/m .  Exam:  GENERAL APPEARANCE:  Alert, in no  distress  ENT:  Mouth and posterior oropharynx normal, moist mucous membranes, normal hearing acuity  EYES:  EOM, conjunctivae, lids, pupils and irises normal, PERRL  RESP:  respiratory effort and palpation of chest normal, lungs clear to auscultation , no respiratory distress  CV:  Palpation and auscultation of heart done , regular rate and rhythm, no murmur, rub, or gallop, no edema  ABDOMEN:  normal bowel sounds, soft, nontender, no hepatosplenomegaly or other masses  M/S:   patient sitting upright in WC, did not visualize gait  SKIN:  Inspection of skin and subcutaneous tissue baseline, did not visualize left hip incision  NEURO:   Cranial nerves 2-12 are normal tested and grossly at patient's baseline, speech wnl  PSYCH:  affect and mood normal    SNF labs: Recent labs in [facility] reviewed by me today.     ASSESSMENT/PLAN:  Displaced fracture of left femoral neck (H) s/p redo ORIF on 8/27/19 due to hardware failure   Physical deconditioning  Acute/ongoing: DC to [place] with home PT, OT and HHA through [facility]. , continue tylenol 1000mg TID prn  DC oxycodone patient not taking  Continue  ASA 325mg QD     PAN (polyarteritis nodosa) (H)  Ongoing: continues on Imuran 150mg QD, restart prednisone 20mg QD (per Internal medicine discharge summary), f/u with rheumatology as OP   Off prednisone     Anemia due to blood loss, acute  Acute/ongoing: Hgb follow, 8.7 during TCU stay     Drug-induced constipation  Acute/ongoing: continue senna 1 PO QD prn     Chronic systolic congestive heart failure (H)  Ongoing: weight stable in TCU  Admit weight 111.4lbs and discharge weight 106.9lbs  Follow off Rx     Leukocytosis, unspecified type  Acute/stress induced, WBC 10.4 wnl at this time     DISCHARGE PLAN:    Follow up labs: No labs orders/due    Medical Follow Up:      Follow up with primary care provider in 1-2 weeks    MTM referral needed and placed by this provider: No    Current [place] scheduled appointments:       Discharge  Services: Home Care:  Occupational Therapy, Physical Therapy and Home Health Aide    Discharge Instructions Verbalized to Patient at Discharge:     None      TOTAL DISCHARGE TIME:   Greater than 30 minutes  Electronically signed by:  Tonya Lynn Haase, APRN CNP

## 2019-09-17 NOTE — LETTER
9/17/2019        RE: Rayne Mabry  [address]  [id number]  [place] [address]        [place] [facility] DISCHARGE SUMMARY  PATIENT'S NAME: Rayne Mabry  YOB: 1930  MEDICAL RECORD NUMBER:  [id number]  Place of Service where encounter took place:  [facility] ([facility] [[id number]]    PRIMARY CARE PROVIDER AND CLINIC RESPONSIBLE AFTER TRANSFER:   Susan Hanley, [facility] [address] [facility] [address]    [facility] Provider     Transferring providers: Tonya Lynn Haase, APRN CNP, Dr. Aurelia MD  Recent Hospitalization/ED:  [facility] Hospital stay 8/26/19 to 8/30/19.  Date of SNF Admission: August / 30 / 2019  Date of SNF (anticipated) Discharge: September / 19 / 2019  Discharged to: previous [place] with increased services  Cognitive Scores: BIMS 13/15, SBT 12/28  Physical Function: Ambulating 300 ft with RW indep  DME: Walker    CODE STATUS/ADVANCE DIRECTIVES DISCUSSION:  DNR   ALLERGIES: Zolpidem    DISCHARGE DIAGNOSIS/NURSING FACILITY COURSE:   Patient progressed in therapy to walking 300 feet with RW indep, indep to minimal assist with ADL's, cognitive assessment CPT 4.4/5.6 patient lives in [facility] will DC to [facility] with increased services for AM and PM cares and assist to walk to meals. Patient will DC home with home PT, OT, HHA through [facility].     Past Medical History:  has a past medical history of Acute CHF (H), Anxiety, Arthritis, Chronic anemia, Closed left hip fracture (H), Depression, Fall (07/01/2019), GERD (gastroesophageal reflux disease), Hypoxia, MAC (mycobacterium avium-intracellulare complex), Mild cognitive disorder, Polyarteritis nodosa (H), Polyneuropathy, Thyroid disease, and UTI (urinary tract infection) (07/04/2019).    Discharge Medications:  Current Outpatient Medications   Medication Sig Dispense Refill     acetaminophen (TYLENOL) 500 MG tablet Take 1,000 mg by mouth 3 times daily       albuterol (PROAIR HFA/PROVENTIL  HFA/VENTOLIN HFA) 108 (90 BASE) MCG/ACT Inhaler Inhale 2 puffs into the lungs every 4 hours as needed        aspirin (ASA) 325 MG EC tablet Take 1 tablet (325 mg) by mouth daily 30 tablet 0     azaTHIOprine (IMURAN) 50 MG tablet Take 150 mg by mouth daily       bismuth subsalicylate (PEPTO-BISMOL) 262 MG chewable tablet Take 2 tablets by mouth every hour as needed (max 8 tablets/24 hours)       calcium carbonate 500 mg, elemental, (OSCAL;OYSTER SHELL CALCIUM) 500 MG tablet Take 500 mg by mouth 2 times daily On tuesdays       ergocalciferol (ERGOCALCIFEROL) 27090 units capsule Take 50,000 Units by mouth once a week Every Tuesday       fluticasone (FLONASE) 50 MCG/ACT nasal spray Spray 2 sprays into both nostrils daily       levothyroxine (SYNTHROID/LEVOTHROID) 88 MCG tablet Take 88 mcg by mouth daily        melatonin 3 MG tablet Take 3 mg by mouth At Bedtime        omeprazole (PRILOSEC) 40 MG DR capsule Take 1 capsule (40 mg) by mouth daily 30 capsule 0     ondansetron (ZOFRAN-ODT) 4 MG ODT tab Take 1 tablet (4 mg) by mouth every 6 hours as needed for nausea or vomiting 8 tablet 0     oxyCODONE (ROXICODONE) 5 MG tablet Give 0.5 tablet by mouth every 4 hours as needed for pain 3-6/10 AND Give 1 tablet by mouth every 4 hours as needed for pain 7-10/10       senna (SENOKOT) 8.6 MG tablet Take 1 tablet by mouth daily as needed for constipation       sertraline (ZOLOFT) 50 MG tablet Take 50 mg by mouth At Bedtime          Medication Changes/Rationale:     DC prednisone after hospitalization with no concerns    Controlled medications sent with patient:   not applicable/none     ROS:   10 point ROS of systems including Constitutional, Eyes, Respiratory, Cardiovascular, Gastroenterology, Genitourinary, Integumentary, Musculoskeletal, Psychiatric were all negative except for pertinent positives noted in my HPI.    Physical Exam:   Vitals: /71   Pulse 92   Temp 97.3  F (36.3  C)   Resp 16   Wt 48.1 kg (106 lb)    SpO2 95%   BMI 17.64 kg/m     BMI= Body mass index is 17.64 kg/m .  Exam:  GENERAL APPEARANCE:  Alert, in no distress  ENT:  Mouth and posterior oropharynx normal, moist mucous membranes, normal hearing acuity  EYES:  EOM, conjunctivae, lids, pupils and irises normal, PERRL  RESP:  respiratory effort and palpation of chest normal, lungs clear to auscultation , no respiratory distress  CV:  Palpation and auscultation of heart done , regular rate and rhythm, no murmur, rub, or gallop, no edema  ABDOMEN:  normal bowel sounds, soft, nontender, no hepatosplenomegaly or other masses  M/S:   patient sitting upright in WC, did not visualize gait  SKIN:  Inspection of skin and subcutaneous tissue baseline, did not visualize left hip incision  NEURO:   Cranial nerves 2-12 are normal tested and grossly at patient's baseline, speech wnl  PSYCH:  affect and mood normal    SNF labs: Recent labs in [facility] reviewed by me today.     ASSESSMENT/PLAN:  Displaced fracture of left femoral neck (H) s/p redo ORIF on 8/27/19 due to hardware failure   Physical deconditioning  Acute/ongoing: DC to senior care with home PT, OT and HHA through [facility]. , continue tylenol 1000mg TID prn  DC oxycodone patient not taking  Continue  ASA 325mg QD     PAN (polyarteritis nodosa) (H)  Ongoing: continues on Imuran 150mg QD, restart prednisone 20mg QD (per Internal medicine discharge summary), f/u with rheumatology as OP   Off prednisone     Anemia due to blood loss, acute  Acute/ongoing: Hgb follow, 8.7 during TCU stay     Drug-induced constipation  Acute/ongoing: continue senna 1 PO QD prn     Chronic systolic congestive heart failure (H)  Ongoing: weight stable in TCU  Admit weight 111.4lbs and discharge weight 106.9lbs  Follow off Rx     Leukocytosis, unspecified type  Acute/stress induced, WBC 10.4 wnl at this time     DISCHARGE PLAN:    Follow up labs: No labs orders/due    Medical Follow Up:      Follow up with primary care provider in 1-2 weeks    MTM  referral needed and placed by this provider: No    Current [place] scheduled appointments:       Discharge Services: Home Care:  Occupational Therapy, Physical Therapy and Home Health Aide    Discharge Instructions Verbalized to Patient at Discharge:     None      TOTAL DISCHARGE TIME:   Greater than 30 minutes  Electronically signed by:  Tonya Lynn Haase, APRN CNP                         Sincerely,        Tonya Lynn Haase, APRN CNP

## 2025-04-15 ENCOUNTER — HOSPITAL ENCOUNTER (INPATIENT)
Facility: CLINIC | Age: OVER 89
DRG: 535 | End: 2025-04-15
Attending: EMERGENCY MEDICINE | Admitting: INTERNAL MEDICINE
Payer: COMMERCIAL

## 2025-04-15 ENCOUNTER — MEDICAL CORRESPONDENCE (OUTPATIENT)
Dept: HEALTH INFORMATION MANAGEMENT | Facility: CLINIC | Age: OVER 89
End: 2025-04-15

## 2025-04-15 ENCOUNTER — APPOINTMENT (OUTPATIENT)
Dept: CT IMAGING | Facility: CLINIC | Age: OVER 89
DRG: 535 | End: 2025-04-15
Attending: EMERGENCY MEDICINE
Payer: COMMERCIAL

## 2025-04-15 ENCOUNTER — APPOINTMENT (OUTPATIENT)
Dept: GENERAL RADIOLOGY | Facility: CLINIC | Age: OVER 89
DRG: 535 | End: 2025-04-15
Attending: EMERGENCY MEDICINE
Payer: COMMERCIAL

## 2025-04-15 DIAGNOSIS — Z96.649 PERIPROSTHETIC FRACTURE OF HIP, INITIAL ENCOUNTER: ICD-10-CM

## 2025-04-15 DIAGNOSIS — S32.10XA CLOSED FRACTURE OF SACRUM, UNSPECIFIED PORTION OF SACRUM, INITIAL ENCOUNTER (H): ICD-10-CM

## 2025-04-15 DIAGNOSIS — S32.592A PUBIC RAMUS FRACTURE, LEFT, CLOSED, INITIAL ENCOUNTER (H): ICD-10-CM

## 2025-04-15 DIAGNOSIS — R29.6 UNWITNESSED FALL: ICD-10-CM

## 2025-04-15 DIAGNOSIS — J96.01 ACUTE RESPIRATORY FAILURE WITH HYPOXIA (H): ICD-10-CM

## 2025-04-15 DIAGNOSIS — J18.9 PNEUMONIA OF BOTH LOWER LOBES DUE TO INFECTIOUS ORGANISM: ICD-10-CM

## 2025-04-15 DIAGNOSIS — I48.0 PAROXYSMAL ATRIAL FIBRILLATION (H): Primary | ICD-10-CM

## 2025-04-15 DIAGNOSIS — M97.8XXA PERIPROSTHETIC FRACTURE OF HIP, INITIAL ENCOUNTER: ICD-10-CM

## 2025-04-15 PROBLEM — A31.0 MYCOBACTERIUM AVIUM-INTRACELLULARE INFECTION (H): Status: ACTIVE | Noted: 2025-04-15

## 2025-04-15 LAB
ALBUMIN SERPL BCG-MCNC: 3.7 G/DL (ref 3.5–5.2)
ALP SERPL-CCNC: 69 U/L (ref 40–150)
ALT SERPL W P-5'-P-CCNC: 31 U/L (ref 0–50)
ANION GAP SERPL CALCULATED.3IONS-SCNC: 11 MMOL/L (ref 7–15)
AST SERPL W P-5'-P-CCNC: 29 U/L (ref 0–45)
ATRIAL RATE - MUSE: 95 BPM
BASE EXCESS BLDV CALC-SCNC: 3 MMOL/L (ref -3–3)
BASOPHILS # BLD AUTO: 0 10E3/UL (ref 0–0.2)
BASOPHILS NFR BLD AUTO: 0 %
BILIRUB SERPL-MCNC: 0.5 MG/DL
BUN SERPL-MCNC: 15.7 MG/DL (ref 8–23)
CALCIUM SERPL-MCNC: 9.8 MG/DL (ref 8.8–10.4)
CHLORIDE SERPL-SCNC: 101 MMOL/L (ref 98–107)
CREAT BLD-MCNC: 0.6 MG/DL (ref 0.5–1)
CREAT SERPL-MCNC: 0.62 MG/DL (ref 0.51–0.95)
DIASTOLIC BLOOD PRESSURE - MUSE: NORMAL MMHG
EGFRCR SERPLBLD CKD-EPI 2021: 82 ML/MIN/1.73M2
EGFRCR SERPLBLD CKD-EPI 2021: >60 ML/MIN/1.73M2
EOSINOPHIL # BLD AUTO: 0.1 10E3/UL (ref 0–0.7)
EOSINOPHIL NFR BLD AUTO: 1 %
ERYTHROCYTE [DISTWIDTH] IN BLOOD BY AUTOMATED COUNT: 12.9 % (ref 10–15)
GLUCOSE SERPL-MCNC: 109 MG/DL (ref 70–99)
HCO3 BLDV-SCNC: 29 MMOL/L (ref 21–28)
HCO3 SERPL-SCNC: 26 MMOL/L (ref 22–29)
HCT VFR BLD AUTO: 41.9 % (ref 35–47)
HGB BLD-MCNC: 13.7 G/DL (ref 11.7–15.7)
IMM GRANULOCYTES # BLD: 0.2 10E3/UL
IMM GRANULOCYTES NFR BLD: 2 %
INTERPRETATION ECG - MUSE: NORMAL
LACTATE BLD-SCNC: 0.6 MMOL/L (ref 0.7–2)
LYMPHOCYTES # BLD AUTO: 0.5 10E3/UL (ref 0.8–5.3)
LYMPHOCYTES NFR BLD AUTO: 4 %
MCH RBC QN AUTO: 31.3 PG (ref 26.5–33)
MCHC RBC AUTO-ENTMCNC: 32.7 G/DL (ref 31.5–36.5)
MCV RBC AUTO: 96 FL (ref 78–100)
MONOCYTES # BLD AUTO: 0.3 10E3/UL (ref 0–1.3)
MONOCYTES NFR BLD AUTO: 3 %
NEUTROPHILS # BLD AUTO: 10.8 10E3/UL (ref 1.6–8.3)
NEUTROPHILS NFR BLD AUTO: 91 %
NRBC # BLD AUTO: 0 10E3/UL
NRBC BLD AUTO-RTO: 0 /100
NT-PROBNP SERPL-MCNC: 218 PG/ML (ref 0–1800)
P AXIS - MUSE: 58 DEGREES
PCO2 BLDV: 51 MM HG (ref 40–50)
PH BLDV: 7.37 [PH] (ref 7.32–7.43)
PLATELET # BLD AUTO: 233 10E3/UL (ref 150–450)
PO2 BLDV: 9 MM HG (ref 25–47)
POTASSIUM SERPL-SCNC: 4.3 MMOL/L (ref 3.4–5.3)
PR INTERVAL - MUSE: 220 MS
PROCALCITONIN SERPL IA-MCNC: 0.1 NG/ML
PROT SERPL-MCNC: 7 G/DL (ref 6.4–8.3)
QRS DURATION - MUSE: 76 MS
QT - MUSE: 364 MS
QTC - MUSE: 457 MS
R AXIS - MUSE: -39 DEGREES
RBC # BLD AUTO: 4.38 10E6/UL (ref 3.8–5.2)
SAO2 % BLDV: 7 % (ref 70–75)
SODIUM SERPL-SCNC: 138 MMOL/L (ref 135–145)
SYSTOLIC BLOOD PRESSURE - MUSE: NORMAL MMHG
T AXIS - MUSE: 28 DEGREES
TROPONIN T SERPL HS-MCNC: 21 NG/L
TROPONIN T SERPL HS-MCNC: 21 NG/L
VENTRICULAR RATE- MUSE: 95 BPM
WBC # BLD AUTO: 11.9 10E3/UL (ref 4–11)

## 2025-04-15 PROCEDURE — 250N000011 HC RX IP 250 OP 636: Mod: JZ | Performed by: EMERGENCY MEDICINE

## 2025-04-15 PROCEDURE — 84484 ASSAY OF TROPONIN QUANT: CPT | Performed by: EMERGENCY MEDICINE

## 2025-04-15 PROCEDURE — 82803 BLOOD GASES ANY COMBINATION: CPT

## 2025-04-15 PROCEDURE — 83605 ASSAY OF LACTIC ACID: CPT

## 2025-04-15 PROCEDURE — 85004 AUTOMATED DIFF WBC COUNT: CPT | Performed by: EMERGENCY MEDICINE

## 2025-04-15 PROCEDURE — 93005 ELECTROCARDIOGRAM TRACING: CPT

## 2025-04-15 PROCEDURE — 83880 ASSAY OF NATRIURETIC PEPTIDE: CPT | Performed by: EMERGENCY MEDICINE

## 2025-04-15 PROCEDURE — 70450 CT HEAD/BRAIN W/O DYE: CPT

## 2025-04-15 PROCEDURE — 258N000003 HC RX IP 258 OP 636: Performed by: EMERGENCY MEDICINE

## 2025-04-15 PROCEDURE — 82565 ASSAY OF CREATININE: CPT

## 2025-04-15 PROCEDURE — 73502 X-RAY EXAM HIP UNI 2-3 VIEWS: CPT

## 2025-04-15 PROCEDURE — 87040 BLOOD CULTURE FOR BACTERIA: CPT | Performed by: EMERGENCY MEDICINE

## 2025-04-15 PROCEDURE — 72125 CT NECK SPINE W/O DYE: CPT

## 2025-04-15 PROCEDURE — 250N000011 HC RX IP 250 OP 636: Performed by: EMERGENCY MEDICINE

## 2025-04-15 PROCEDURE — 99285 EMERGENCY DEPT VISIT HI MDM: CPT | Mod: 25

## 2025-04-15 PROCEDURE — 84145 PROCALCITONIN (PCT): CPT | Performed by: EMERGENCY MEDICINE

## 2025-04-15 PROCEDURE — 99223 1ST HOSP IP/OBS HIGH 75: CPT | Performed by: INTERNAL MEDICINE

## 2025-04-15 PROCEDURE — 82247 BILIRUBIN TOTAL: CPT | Performed by: EMERGENCY MEDICINE

## 2025-04-15 PROCEDURE — 250N000009 HC RX 250: Performed by: EMERGENCY MEDICINE

## 2025-04-15 PROCEDURE — 73700 CT LOWER EXTREMITY W/O DYE: CPT | Mod: LT

## 2025-04-15 PROCEDURE — 82040 ASSAY OF SERUM ALBUMIN: CPT | Performed by: EMERGENCY MEDICINE

## 2025-04-15 PROCEDURE — 36415 COLL VENOUS BLD VENIPUNCTURE: CPT | Performed by: EMERGENCY MEDICINE

## 2025-04-15 PROCEDURE — 120N000001 HC R&B MED SURG/OB

## 2025-04-15 PROCEDURE — 71275 CT ANGIOGRAPHY CHEST: CPT

## 2025-04-15 RX ORDER — FLUTICASONE PROPIONATE AND SALMETEROL 500; 50 UG/1; UG/1
1 POWDER RESPIRATORY (INHALATION) 2 TIMES DAILY
COMMUNITY

## 2025-04-15 RX ORDER — CEFTRIAXONE 2 G/1
2 INJECTION, POWDER, FOR SOLUTION INTRAMUSCULAR; INTRAVENOUS ONCE
Status: COMPLETED | OUTPATIENT
Start: 2025-04-15 | End: 2025-04-15

## 2025-04-15 RX ORDER — GABAPENTIN 100 MG/1
100 CAPSULE ORAL EVERY EVENING
Status: ON HOLD | COMMUNITY
End: 2025-04-21

## 2025-04-15 RX ORDER — ALENDRONATE SODIUM 70 MG/1
70 TABLET ORAL
COMMUNITY

## 2025-04-15 RX ORDER — CEFTRIAXONE 2 G/1
2 INJECTION, POWDER, FOR SOLUTION INTRAMUSCULAR; INTRAVENOUS ONCE
Status: DISCONTINUED | OUTPATIENT
Start: 2025-04-15 | End: 2025-04-15

## 2025-04-15 RX ORDER — IOPAMIDOL 755 MG/ML
53 INJECTION, SOLUTION INTRAVASCULAR ONCE
Status: COMPLETED | OUTPATIENT
Start: 2025-04-15 | End: 2025-04-15

## 2025-04-15 RX ORDER — IPRATROPIUM BROMIDE 42 UG/1
2 SPRAY, METERED NASAL 2 TIMES DAILY
COMMUNITY

## 2025-04-15 RX ORDER — AZITHROMYCIN MONOHYDRATE 500 MG/5ML
500 INJECTION, POWDER, LYOPHILIZED, FOR SOLUTION INTRAVENOUS ONCE
Status: DISCONTINUED | OUTPATIENT
Start: 2025-04-15 | End: 2025-04-15

## 2025-04-15 RX ORDER — FENTANYL CITRATE 50 UG/ML
50 INJECTION, SOLUTION INTRAMUSCULAR; INTRAVENOUS
Status: DISCONTINUED | OUTPATIENT
Start: 2025-04-15 | End: 2025-04-16

## 2025-04-15 RX ORDER — AZITHROMYCIN MONOHYDRATE 500 MG/5ML
500 INJECTION, POWDER, LYOPHILIZED, FOR SOLUTION INTRAVENOUS ONCE
Status: COMPLETED | OUTPATIENT
Start: 2025-04-15 | End: 2025-04-16

## 2025-04-15 RX ADMIN — IOPAMIDOL 53 ML: 755 INJECTION, SOLUTION INTRAVENOUS at 20:36

## 2025-04-15 RX ADMIN — SODIUM CHLORIDE 81 ML: 9 INJECTION, SOLUTION INTRAVENOUS at 20:36

## 2025-04-15 RX ADMIN — AZITHROMYCIN MONOHYDRATE 500 MG: 500 INJECTION, POWDER, LYOPHILIZED, FOR SOLUTION INTRAVENOUS at 23:46

## 2025-04-15 RX ADMIN — FENTANYL CITRATE 50 MCG: 50 INJECTION, SOLUTION INTRAMUSCULAR; INTRAVENOUS at 23:02

## 2025-04-15 RX ADMIN — CEFTRIAXONE SODIUM 2 G: 2 INJECTION, POWDER, FOR SOLUTION INTRAMUSCULAR; INTRAVENOUS at 22:01

## 2025-04-15 RX ADMIN — SODIUM CHLORIDE 1000 ML: 9 INJECTION, SOLUTION INTRAVENOUS at 21:25

## 2025-04-15 RX ADMIN — FENTANYL CITRATE 50 MCG: 50 INJECTION, SOLUTION INTRAMUSCULAR; INTRAVENOUS at 21:26

## 2025-04-15 ASSESSMENT — ACTIVITIES OF DAILY LIVING (ADL)
ADLS_ACUITY_SCORE: 52

## 2025-04-16 ENCOUNTER — APPOINTMENT (OUTPATIENT)
Dept: SPEECH THERAPY | Facility: CLINIC | Age: OVER 89
DRG: 535 | End: 2025-04-16
Attending: INTERNAL MEDICINE
Payer: COMMERCIAL

## 2025-04-16 ENCOUNTER — APPOINTMENT (OUTPATIENT)
Dept: PHYSICAL THERAPY | Facility: CLINIC | Age: OVER 89
DRG: 535 | End: 2025-04-16
Attending: INTERNAL MEDICINE
Payer: COMMERCIAL

## 2025-04-16 LAB
ALBUMIN UR-MCNC: NEGATIVE MG/DL
ANION GAP SERPL CALCULATED.3IONS-SCNC: 13 MMOL/L (ref 7–15)
APPEARANCE UR: CLEAR
BILIRUB UR QL STRIP: NEGATIVE
BUN SERPL-MCNC: 13.5 MG/DL (ref 8–23)
CALCIUM SERPL-MCNC: 9 MG/DL (ref 8.8–10.4)
CHLORIDE SERPL-SCNC: 103 MMOL/L (ref 98–107)
COLOR UR AUTO: ABNORMAL
CREAT SERPL-MCNC: 0.62 MG/DL (ref 0.51–0.95)
EGFRCR SERPLBLD CKD-EPI 2021: 82 ML/MIN/1.73M2
ERYTHROCYTE [DISTWIDTH] IN BLOOD BY AUTOMATED COUNT: 13 % (ref 10–15)
GLUCOSE SERPL-MCNC: 126 MG/DL (ref 70–99)
GLUCOSE UR STRIP-MCNC: NEGATIVE MG/DL
HCO3 SERPL-SCNC: 24 MMOL/L (ref 22–29)
HCT VFR BLD AUTO: 40.9 % (ref 35–47)
HGB BLD-MCNC: 13.4 G/DL (ref 11.7–15.7)
HGB UR QL STRIP: NEGATIVE
KETONES UR STRIP-MCNC: ABNORMAL MG/DL
LEUKOCYTE ESTERASE UR QL STRIP: ABNORMAL
MCH RBC QN AUTO: 31 PG (ref 26.5–33)
MCHC RBC AUTO-ENTMCNC: 32.8 G/DL (ref 31.5–36.5)
MCV RBC AUTO: 95 FL (ref 78–100)
NITRATE UR QL: NEGATIVE
PH UR STRIP: 7 [PH] (ref 5–7)
PLATELET # BLD AUTO: 226 10E3/UL (ref 150–450)
POTASSIUM SERPL-SCNC: 3.7 MMOL/L (ref 3.4–5.3)
RBC # BLD AUTO: 4.32 10E6/UL (ref 3.8–5.2)
RBC URINE: 1 /HPF
SODIUM SERPL-SCNC: 140 MMOL/L (ref 135–145)
SP GR UR STRIP: 1.03 (ref 1–1.03)
UROBILINOGEN UR STRIP-MCNC: NORMAL MG/DL
WBC # BLD AUTO: 16.9 10E3/UL (ref 4–11)
WBC URINE: 7 /HPF

## 2025-04-16 PROCEDURE — 36415 COLL VENOUS BLD VENIPUNCTURE: CPT | Performed by: INTERNAL MEDICINE

## 2025-04-16 PROCEDURE — 250N000013 HC RX MED GY IP 250 OP 250 PS 637: Performed by: INTERNAL MEDICINE

## 2025-04-16 PROCEDURE — 92610 EVALUATE SWALLOWING FUNCTION: CPT | Mod: GN | Performed by: SPEECH-LANGUAGE PATHOLOGIST

## 2025-04-16 PROCEDURE — 120N000001 HC R&B MED SURG/OB

## 2025-04-16 PROCEDURE — 99233 SBSQ HOSP IP/OBS HIGH 50: CPT | Performed by: HOSPITALIST

## 2025-04-16 PROCEDURE — 250N000011 HC RX IP 250 OP 636: Performed by: INTERNAL MEDICINE

## 2025-04-16 PROCEDURE — 250N000011 HC RX IP 250 OP 636: Performed by: HOSPITALIST

## 2025-04-16 PROCEDURE — 92526 ORAL FUNCTION THERAPY: CPT | Mod: GN | Performed by: SPEECH-LANGUAGE PATHOLOGIST

## 2025-04-16 PROCEDURE — 250N000013 HC RX MED GY IP 250 OP 250 PS 637: Performed by: HOSPITALIST

## 2025-04-16 PROCEDURE — 85027 COMPLETE CBC AUTOMATED: CPT | Performed by: INTERNAL MEDICINE

## 2025-04-16 PROCEDURE — 250N000012 HC RX MED GY IP 250 OP 636 PS 637: Performed by: INTERNAL MEDICINE

## 2025-04-16 PROCEDURE — 80048 BASIC METABOLIC PNL TOTAL CA: CPT | Performed by: INTERNAL MEDICINE

## 2025-04-16 PROCEDURE — 81001 URINALYSIS AUTO W/SCOPE: CPT | Performed by: INTERNAL MEDICINE

## 2025-04-16 PROCEDURE — 97161 PT EVAL LOW COMPLEX 20 MIN: CPT | Mod: GP | Performed by: PHYSICAL THERAPIST

## 2025-04-16 PROCEDURE — 97530 THERAPEUTIC ACTIVITIES: CPT | Mod: GP | Performed by: PHYSICAL THERAPIST

## 2025-04-16 PROCEDURE — 82310 ASSAY OF CALCIUM: CPT | Performed by: INTERNAL MEDICINE

## 2025-04-16 PROCEDURE — 81003 URINALYSIS AUTO W/O SCOPE: CPT | Performed by: INTERNAL MEDICINE

## 2025-04-16 RX ORDER — BISACODYL 10 MG
10 SUPPOSITORY, RECTAL RECTAL DAILY PRN
Status: DISCONTINUED | OUTPATIENT
Start: 2025-04-16 | End: 2025-04-21 | Stop reason: HOSPADM

## 2025-04-16 RX ORDER — CALCIUM CARBONATE 500(1250)
500 TABLET ORAL DAILY
Status: DISCONTINUED | OUTPATIENT
Start: 2025-04-16 | End: 2025-04-21 | Stop reason: HOSPADM

## 2025-04-16 RX ORDER — SENNOSIDES 8.6 MG
1 TABLET ORAL DAILY
Status: DISCONTINUED | OUTPATIENT
Start: 2025-04-16 | End: 2025-04-16

## 2025-04-16 RX ORDER — AZITHROMYCIN 250 MG/1
250 TABLET, FILM COATED ORAL DAILY
Status: DISCONTINUED | OUTPATIENT
Start: 2025-04-16 | End: 2025-04-16

## 2025-04-16 RX ORDER — ONDANSETRON 4 MG/1
4 TABLET, ORALLY DISINTEGRATING ORAL EVERY 6 HOURS PRN
Status: DISCONTINUED | OUTPATIENT
Start: 2025-04-16 | End: 2025-04-21 | Stop reason: HOSPADM

## 2025-04-16 RX ORDER — METHOCARBAMOL 500 MG/1
500 TABLET, FILM COATED ORAL 4 TIMES DAILY
Status: DISCONTINUED | OUTPATIENT
Start: 2025-04-16 | End: 2025-04-16

## 2025-04-16 RX ORDER — AZITHROMYCIN 250 MG/1
250 TABLET, FILM COATED ORAL DAILY
Status: COMPLETED | OUTPATIENT
Start: 2025-04-16 | End: 2025-04-19

## 2025-04-16 RX ORDER — FLUTICASONE PROPIONATE 50 MCG
2 SPRAY, SUSPENSION (ML) NASAL DAILY
Status: DISCONTINUED | OUTPATIENT
Start: 2025-04-16 | End: 2025-04-21 | Stop reason: HOSPADM

## 2025-04-16 RX ORDER — GABAPENTIN 100 MG/1
100 CAPSULE ORAL EVERY EVENING
Status: DISCONTINUED | OUTPATIENT
Start: 2025-04-16 | End: 2025-04-16

## 2025-04-16 RX ORDER — LIDOCAINE 40 MG/G
CREAM TOPICAL
Status: DISCONTINUED | OUTPATIENT
Start: 2025-04-16 | End: 2025-04-17

## 2025-04-16 RX ORDER — FLUTICASONE FUROATE AND VILANTEROL 200; 25 UG/1; UG/1
1 POWDER RESPIRATORY (INHALATION) DAILY
Status: DISCONTINUED | OUTPATIENT
Start: 2025-04-16 | End: 2025-04-21 | Stop reason: HOSPADM

## 2025-04-16 RX ORDER — AMOXICILLIN 250 MG
2 CAPSULE ORAL 2 TIMES DAILY
Status: DISCONTINUED | OUTPATIENT
Start: 2025-04-16 | End: 2025-04-21 | Stop reason: HOSPADM

## 2025-04-16 RX ORDER — AMOXICILLIN 250 MG
1 CAPSULE ORAL 2 TIMES DAILY PRN
Status: DISCONTINUED | OUTPATIENT
Start: 2025-04-16 | End: 2025-04-16

## 2025-04-16 RX ORDER — NALOXONE HYDROCHLORIDE 0.4 MG/ML
0.2 INJECTION, SOLUTION INTRAMUSCULAR; INTRAVENOUS; SUBCUTANEOUS
Status: DISCONTINUED | OUTPATIENT
Start: 2025-04-16 | End: 2025-04-21 | Stop reason: HOSPADM

## 2025-04-16 RX ORDER — NALOXONE HYDROCHLORIDE 0.4 MG/ML
0.4 INJECTION, SOLUTION INTRAMUSCULAR; INTRAVENOUS; SUBCUTANEOUS
Status: DISCONTINUED | OUTPATIENT
Start: 2025-04-16 | End: 2025-04-21 | Stop reason: HOSPADM

## 2025-04-16 RX ORDER — AMOXICILLIN 250 MG
2 CAPSULE ORAL 2 TIMES DAILY PRN
Status: DISCONTINUED | OUTPATIENT
Start: 2025-04-16 | End: 2025-04-16

## 2025-04-16 RX ORDER — CALCIUM CARBONATE 500 MG/1
1000 TABLET, CHEWABLE ORAL 4 TIMES DAILY PRN
Status: DISCONTINUED | OUTPATIENT
Start: 2025-04-16 | End: 2025-04-21 | Stop reason: HOSPADM

## 2025-04-16 RX ORDER — HYDROXYZINE HYDROCHLORIDE 25 MG/1
50 TABLET, FILM COATED ORAL EVERY 6 HOURS PRN
Status: DISCONTINUED | OUTPATIENT
Start: 2025-04-16 | End: 2025-04-18

## 2025-04-16 RX ORDER — SERTRALINE HYDROCHLORIDE 25 MG/1
25 TABLET, FILM COATED ORAL AT BEDTIME
Status: DISCONTINUED | OUTPATIENT
Start: 2025-04-16 | End: 2025-04-21 | Stop reason: HOSPADM

## 2025-04-16 RX ORDER — CEFTRIAXONE 2 G/1
2 INJECTION, POWDER, FOR SOLUTION INTRAMUSCULAR; INTRAVENOUS EVERY 24 HOURS
Status: DISCONTINUED | OUTPATIENT
Start: 2025-04-16 | End: 2025-04-21 | Stop reason: HOSPADM

## 2025-04-16 RX ORDER — ACETAMINOPHEN 325 MG/1
975 TABLET ORAL EVERY 8 HOURS
Status: DISCONTINUED | OUTPATIENT
Start: 2025-04-16 | End: 2025-04-21 | Stop reason: HOSPADM

## 2025-04-16 RX ORDER — ACETAMINOPHEN 650 MG/1
650 SUPPOSITORY RECTAL EVERY 4 HOURS PRN
Status: DISCONTINUED | OUTPATIENT
Start: 2025-04-16 | End: 2025-04-16

## 2025-04-16 RX ORDER — IPRATROPIUM BROMIDE 42 UG/1
2 SPRAY, METERED NASAL 2 TIMES DAILY
Status: DISCONTINUED | OUTPATIENT
Start: 2025-04-16 | End: 2025-04-21 | Stop reason: HOSPADM

## 2025-04-16 RX ORDER — ENOXAPARIN SODIUM 100 MG/ML
40 INJECTION SUBCUTANEOUS EVERY 24 HOURS
Status: DISCONTINUED | OUTPATIENT
Start: 2025-04-16 | End: 2025-04-21 | Stop reason: HOSPADM

## 2025-04-16 RX ORDER — POLYETHYLENE GLYCOL 3350 17 G/17G
17 POWDER, FOR SOLUTION ORAL 2 TIMES DAILY PRN
Status: DISCONTINUED | OUTPATIENT
Start: 2025-04-16 | End: 2025-04-21 | Stop reason: HOSPADM

## 2025-04-16 RX ORDER — HYDROXYZINE HYDROCHLORIDE 25 MG/1
25 TABLET, FILM COATED ORAL EVERY 6 HOURS PRN
Status: DISCONTINUED | OUTPATIENT
Start: 2025-04-16 | End: 2025-04-18

## 2025-04-16 RX ORDER — TRAMADOL HYDROCHLORIDE 50 MG/1
50 TABLET ORAL EVERY 6 HOURS PRN
Status: DISCONTINUED | OUTPATIENT
Start: 2025-04-16 | End: 2025-04-16

## 2025-04-16 RX ORDER — PROCHLORPERAZINE MALEATE 5 MG/1
5 TABLET ORAL EVERY 6 HOURS PRN
Status: DISCONTINUED | OUTPATIENT
Start: 2025-04-16 | End: 2025-04-21 | Stop reason: HOSPADM

## 2025-04-16 RX ORDER — GABAPENTIN 100 MG/1
100 CAPSULE ORAL 2 TIMES DAILY
Status: DISCONTINUED | OUTPATIENT
Start: 2025-04-16 | End: 2025-04-21 | Stop reason: HOSPADM

## 2025-04-16 RX ORDER — LEVOTHYROXINE SODIUM 75 UG/1
75 TABLET ORAL DAILY
Status: DISCONTINUED | OUTPATIENT
Start: 2025-04-16 | End: 2025-04-21 | Stop reason: HOSPADM

## 2025-04-16 RX ORDER — ONDANSETRON 2 MG/ML
4 INJECTION INTRAMUSCULAR; INTRAVENOUS EVERY 6 HOURS PRN
Status: DISCONTINUED | OUTPATIENT
Start: 2025-04-16 | End: 2025-04-21 | Stop reason: HOSPADM

## 2025-04-16 RX ORDER — ACETAMINOPHEN 325 MG/1
650 TABLET ORAL EVERY 4 HOURS PRN
Status: DISCONTINUED | OUTPATIENT
Start: 2025-04-16 | End: 2025-04-16

## 2025-04-16 RX ORDER — HYDROMORPHONE HCL IN WATER/PF 6 MG/30 ML
0.2 PATIENT CONTROLLED ANALGESIA SYRINGE INTRAVENOUS EVERY 6 HOURS PRN
Status: DISCONTINUED | OUTPATIENT
Start: 2025-04-16 | End: 2025-04-17

## 2025-04-16 RX ORDER — METHOCARBAMOL 500 MG/1
500 TABLET, FILM COATED ORAL EVERY 8 HOURS PRN
Status: DISCONTINUED | OUTPATIENT
Start: 2025-04-16 | End: 2025-04-21 | Stop reason: HOSPADM

## 2025-04-16 RX ORDER — HYDROMORPHONE HCL IN WATER/PF 6 MG/30 ML
0.2 PATIENT CONTROLLED ANALGESIA SYRINGE INTRAVENOUS
Status: DISCONTINUED | OUTPATIENT
Start: 2025-04-16 | End: 2025-04-16

## 2025-04-16 RX ORDER — AZATHIOPRINE 50 MG/1
50 TABLET ORAL DAILY
Status: DISCONTINUED | OUTPATIENT
Start: 2025-04-16 | End: 2025-04-21 | Stop reason: HOSPADM

## 2025-04-16 RX ORDER — BISMUTH SUBSALICYLATE 262 MG/1
2 TABLET, CHEWABLE ORAL
Status: DISCONTINUED | OUTPATIENT
Start: 2025-04-16 | End: 2025-04-21 | Stop reason: HOSPADM

## 2025-04-16 RX ORDER — LIDOCAINE 4 G/G
1 PATCH TOPICAL
Status: DISCONTINUED | OUTPATIENT
Start: 2025-04-17 | End: 2025-04-21 | Stop reason: HOSPADM

## 2025-04-16 RX ADMIN — CEFTRIAXONE SODIUM 2 G: 2 INJECTION, POWDER, FOR SOLUTION INTRAMUSCULAR; INTRAVENOUS at 17:29

## 2025-04-16 RX ADMIN — ACETAMINOPHEN 975 MG: 325 TABLET, FILM COATED ORAL at 10:46

## 2025-04-16 RX ADMIN — IPRATROPIUM BROMIDE 2 SPRAY: 42 SPRAY NASAL at 21:02

## 2025-04-16 RX ADMIN — SENNOSIDES 1 TABLET: 8.6 TABLET, FILM COATED ORAL at 10:47

## 2025-04-16 RX ADMIN — ENOXAPARIN SODIUM 40 MG: 40 INJECTION SUBCUTANEOUS at 17:29

## 2025-04-16 RX ADMIN — LEVOTHYROXINE SODIUM 75 MCG: 75 TABLET ORAL at 10:45

## 2025-04-16 RX ADMIN — METHOCARBAMOL 500 MG: 500 TABLET ORAL at 10:46

## 2025-04-16 RX ADMIN — FLUTICASONE FUROATE AND VILANTEROL TRIFENATATE 1 PUFF: 200; 25 POWDER RESPIRATORY (INHALATION) at 10:46

## 2025-04-16 RX ADMIN — Medication 125 MCG: at 10:45

## 2025-04-16 RX ADMIN — FLUTICASONE PROPIONATE 2 SPRAY: 50 SPRAY, METERED NASAL at 10:47

## 2025-04-16 RX ADMIN — HYDROMORPHONE HYDROCHLORIDE 0.2 MG: 0.2 INJECTION, SOLUTION INTRAMUSCULAR; INTRAVENOUS; SUBCUTANEOUS at 08:36

## 2025-04-16 RX ADMIN — SENNOSIDES AND DOCUSATE SODIUM 2 TABLET: 50; 8.6 TABLET ORAL at 21:00

## 2025-04-16 RX ADMIN — GABAPENTIN 100 MG: 100 CAPSULE ORAL at 13:22

## 2025-04-16 RX ADMIN — AZATHIOPRINE 50 MG: 50 TABLET ORAL at 10:46

## 2025-04-16 RX ADMIN — CALCIUM 500 MG: 500 TABLET ORAL at 10:45

## 2025-04-16 RX ADMIN — SERTRALINE HYDROCHLORIDE 25 MG: 25 TABLET ORAL at 21:00

## 2025-04-16 RX ADMIN — IPRATROPIUM BROMIDE 2 SPRAY: 42 SPRAY NASAL at 10:47

## 2025-04-16 RX ADMIN — GABAPENTIN 100 MG: 100 CAPSULE ORAL at 21:00

## 2025-04-16 ASSESSMENT — ACTIVITIES OF DAILY LIVING (ADL)
ADLS_ACUITY_SCORE: 68
ADLS_ACUITY_SCORE: 52
ADLS_ACUITY_SCORE: 54
ADLS_ACUITY_SCORE: 52
ADLS_ACUITY_SCORE: 68
ADLS_ACUITY_SCORE: 59
ADLS_ACUITY_SCORE: 63
ADLS_ACUITY_SCORE: 54
ADLS_ACUITY_SCORE: 68
ADLS_ACUITY_SCORE: 68
ADLS_ACUITY_SCORE: 59
ADLS_ACUITY_SCORE: 59
ADLS_ACUITY_SCORE: 68
ADLS_ACUITY_SCORE: 63
ADLS_ACUITY_SCORE: 55
ADLS_ACUITY_SCORE: 59
ADLS_ACUITY_SCORE: 68
ADLS_ACUITY_SCORE: 60
ADLS_ACUITY_SCORE: 58
ADLS_ACUITY_SCORE: 59

## 2025-04-16 NOTE — PROGRESS NOTES
04/16/25 1442   Appointment Info   Signing Clinician's Name / Credentials (PT) Joyce Barajas, PT   Living Environment   People in Home alone   Current Living Arrangements assisted living   Home Accessibility no concerns   Living Environment Comments Does get lunch and supper meals delivered to her apartment.   Self-Care   Usual Activity Tolerance moderate   Current Activity Tolerance poor   Regular Exercise No   Equipment Currently Used at Home shower chair;walker, rolling   Fall history within last six months yes   Number of times patient has fallen within last six months 1   Activity/Exercise/Self-Care Comment Maybe another fall 9 months ago. Makes her own breakfast, otherwise [facility] delivers meals. Showers I'lly with shower chair but daughter is always on phone listening for any concerns. Son present during session to give this information. He states otherwise that pt. walks in her apartment with her 4WW but does very little mobility throughout the day.   General Information   Onset of Illness/Injury or Date of Surgery 04/15/25   Referring Physician Sharona Pearl   Patient/Family Therapy Goals Statement (PT) None stated by pt. Son came back in at end of session. Had stepped out while PT was working. Had talked with his siblings and given an experience that wasn't good with TCU in past they may consider providing A at patient's home upon D/C depending on level of A needed.   Pertinent History of Current Problem (include personal factors and/or comorbidities that impact the POC) Patient admitted with pelvic and sacral fractures after fall at home.  Her PMH is significant for arteritis, vasculitis, dementia, and recurrent UTIs.   Existing Precautions/Restrictions fall   General Observations No weight bearing restrictions per ortho   Cognition   Affect/Mental Status (Cognition) other (see comments)  (mild to moderate memory deficits noted;)   Orientation Status (Cognition) oriented to;person  (knows name and month/day of  birth but not year; knows current month (it is her birthday mo) but not year; does not know where she is; is aware how she fell)   Follows Commands (Cognition) follows one-step commands;75-90% accuracy   Safety Deficit (Cognition) awareness of need for assistance;ability to follow commands;insight into deficits/self-awareness;safety precautions awareness;safety precautions follow-through/compliance   Memory Deficit (Cognition) other (see comments)  (baseline memory deficts per chart and son)   Pain Assessment   Patient Currently in Pain Yes, see Vital Sign flowsheet  (sig. pain with movement and wt. bearing; especially L pelvis; not rated)   Integumentary/Edema   Integumentary/Edema no deficits were identifed   Posture    Posture Forward head position;Protracted shoulders;Kyphosis   Range of Motion (ROM)   Range of Motion ROM deficits secondary to pain   Strength (Manual Muscle Testing)   Strength (Manual Muscle Testing) Deficits observed during functional mobility   Bed Mobility   Comment, (Bed Mobility) Supine to sit with Mod plus Min   Transfers   Comment, (Transfers) STS into WW with Mod plus Min; painful especially at L pelvis   Gait/Stairs (Locomotion)   Comment, (Gait/Stairs) unable   Balance   Balance Comments Min to Mod to maintain balance in stance with WW; painful   Sensory Examination   Sensory Perception patient reports no sensory changes   Coordination   Coordination no deficits were identified   Muscle Tone   Muscle Tone no deficits were identified   Clinical Impression   Criteria for Skilled Therapeutic Intervention Yes, treatment indicated   PT Diagnosis (PT) Impaired functional mobility   Influenced by the following impairments pain, weakness, fatigue, cognition   Functional limitations due to impairments decreased I and endurance in functional mobility   Clinical Presentation (PT Evaluation Complexity) stable   Clinical Presentation Rationale clinical judgement   Clinical Decision Making  (Complexity) low complexity   Planned Therapy Interventions (PT) balance training;bed mobility training;gait training;motor coordination training;patient/family education;postural re-education;strengthening;transfer training;progressive activity/exercise   Risk & Benefits of therapy have been explained evaluation/treatment results reviewed;care plan/treatment goals reviewed;risks/benefits reviewed;current/potential barriers reviewed;participants voiced agreement with care plan;participants included;patient   PT Total Evaluation Time   PT Eval, Low Complexity Minutes (55574) 12   Physical Therapy Goals   PT Frequency 5x/week   PT Predicted Duration/Target Date for Goal Attainment 04/23/25   PT: Bed Mobility Supervision/stand-by assist;Supine to/from sit   PT: Transfers Minimal assist;Sit to/from stand;Bed to/from chair;Assistive device   PT: Gait Minimal assist;Rolling walker;50 feet   Therapeutic Activity   Therapeutic Activities: dynamic activities to improve functional performance Minutes (99441) 24   Symptoms Noted During/After Treatment Increased pain;Significant change in vital signs   Treatment Detail/Skilled Intervention Patient R sidelying. Son in room and NA present as pt. was taking out O2. On 3L with saturation in low 90's to high 80's at times. Moved supine to sit with Mod plus Min, progressing to Mod of 1. Increased pain in L pelvis upon sitting. STS into WW with Mod plus Min. Min to Mod to maintain stance; unable to take steps forwared. Took 3 sidesteps to L inside WW with Mod A; painful. Returned to sit with Min and supine with Mod of 2. Dependent to scoot up in bed. When pt. gripping WW, oximeter reading dropped with HR reading in 50's and O2 down to 75%. With release of hand HR did go up to 95 but O2 remained 85-89 for approx. 3 minutes. Up to 91% by the time PT left. Nursing notified of this. BP at end of session when back in bed was 110/63. Son returned to room and discussed that his siblings and  him have concern about TCU because of bad experience in the past. Education given to patient and son that we will continue to adjust recommendations and encouraged family to discuss and let us know what support they could give if returning to [facility] is the goal. Educated that patient will most likely need full A at d/c and continue as a fall risk with limited ability to mobilize. Son expresses understanding. Pt. will need continued explanation.   PT Discharge Planning   PT Plan transfers, pivot transfers to recliner with WW if can perform pre-gait marching, otherwise SS   PT Discharge Recommendation (DC Rec) Transitional Care Facility   PT Rationale for DC Rec Patient currently requiring Mod plus Min to stand into WW and take three small sidesteps to L; unable to take steps forward. At this time would recommend TCU to increase strength and functional mobility and allow time for healing and medical management of fractures. Son stated that him and siblings are concerned with TCU d/t a poor experience in the past. May want to have pt. return to [facility] with them providing constant A. Will continue to work with pt. and family and adjust recommendations accordingly.   PT Brief overview of current status Mod plus Min to come to  WW; unable to take steps forwrard. SS at this time. Goals of therapy will be to address safe mobility and make recs for d/c to next level of care. Pt and RN will continue to follow all falls risk precautions as documented by RN staff while hospitalized   PT Total Distance Amb During Session (feet) 0   Physical Therapy Time and Intention   Timed Code Treatment Minutes 24   Total Session Time (sum of timed and untimed services) 36

## 2025-04-16 NOTE — CONSULTS
[facility]    Orthopedic Consultation    Rayne Mabry MRN# [id number]   Age: 89 year old YOB: 1930     Date of Admission:  4/15/2025    Reason for consult: Left pubic rami fractures        Requesting physician: Sharona Pearl MD        Level of consult: One-time consult to assist in determining a diagnosis and to recommend an appropriate treatment plan           Assessment and Plan:   Assessment:   Acute nondisplaced to minimally displaced left pubic rami fractures.   Probable nondisplaced left sacral alar fracture.       Plan:   Non-operative management recommended from the orthopedic standpoint regarding pubic rami fractures, and left sacral ala. Recommendations as follows:     -WBAT RLE, patient will likely require TCU. Bed to chair for 2-3 weeks, depending on level of pain. Patient currently requiring sharon stedy for transfers.She may advance her activity as pain allows.   -Pain control regimen per primary team. Limit narcotics, utilize tramadol and pain adjuncts.    -Disposition per primary team.  -Follow up with specialist at [facility] in 6 weeks for repeat x-rays and reevaluation. Please call [phone] ([place]) or [phone] ([place]) to schedule.      Please contact orthopedic trauma team if any questions or concerns arise.           Chief Complaint:   Pelvic fractures, following a mechanical fall          History of Present Illness:   HPI obtained per chart review and confirmed with patient on consultation. Rayne Mabry is a 89 year old female admitted on 4/15/2025. 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     Patient has confusion on consultation, but daughter confirms that patient was on patio when the phone rang and she walked inside towards phone when she loss balance resulting in fall and left sided pain. She resides in [place]. Uses walker at baseline. Was transported to [facility] via ambulance.             Past Medical History:     Past Medical History:   Diagnosis Date    Acute CHF (H)     Anxiety     Arthritis     rheumatoid arthritis    Chronic anemia     Closed left hip fracture (H)     Depression     Fall 07/01/2019    Mechanical fall with left hip fracture - s/p percutaneous fixation     GERD (gastroesophageal reflux disease)     Hypoxia      Hypoxia, suspect atelectasis related to recent surgery.    MAC (mycobacterium avium-intracellulare complex)     Mild cognitive disorder     Polyarteritis nodosa (H)     Polyneuropathy     Thyroid disease     Hypothyroidism.      UTI (urinary tract infection) 07/04/2019    RECENT             Past Surgical History:     Past Surgical History:   Procedure Laterality Date    ARTHROPLASTY HIP Left 8/27/2019    Procedure: LEFT HIP YUDITH ARTHROPLASTY,  WITH PROXIMAL FEMUR HARDWARE REMOVAL;  Surgeon: Thaddeus Barger MD;  Location:  OR    CLOSED REDUCTION, PERCUTANEOUS PINNING HIP Left 7/1/2019    Procedure: PERCUTANEOUS FIXATION OF LEFT HIP WITH SYNTHES FEMORAL NECK SYSTEM;  Surgeon: Axel Curtis MD;  Location:  OR    GYN SURGERY      hyster    HIP SURGERY Right     HYSTERECTOMY      LEFT HIP FX      s/p percutaneous fixation   07/01/2019             Social History:     Social History     Tobacco Use    Smoking status: Never    Smokeless tobacco: Never   Substance Use Topics    Alcohol use: Yes     Comment: 1 glass wine a night             Family History:   No family history on file.          Immunizations:     VACCINE/DOSE   Diptheria   DPT   DTAP   HBIG   Hepatitis A   Hepatitis B    HIB   Influenza   Measles   Meningococcal   MMR   Mumps   Pneumococcal   Polio   Rubella   Small Pox   TDAP   Varicella   Zoster             Allergies:     Allergies   Allergen Reactions    Zolpidem Other (See Comments)     Confusion even with low dose.             Medications:     Current Facility-Administered Medications   Medication Dose Route Frequency Provider Last Rate Last Admin    acetaminophen (TYLENOL) tablet 650 mg  650 mg Oral Q4H PRN Sharona Pearl MD        Or    acetaminophen (TYLENOL) Suppository 650 mg  650 mg Rectal Q4H PRN Sharona Pearl MD        acetaminophen (TYLENOL) tablet 975 mg  975 mg Oral Q8H Sharona Pearl MD   975 mg at 04/16/25 1046    azaTHIOprine (IMURAN) tablet 50 mg  50 mg Oral Daily Sharona Pearl MD   50 mg at 04/16/25 1046    azithromycin (ZITHROMAX) tablet 250 mg  250 mg Oral Daily Sharona Pearl MD        bisacodyl (DULCOLAX) suppository 10 mg  10 mg Rectal Daily PRN Sharona Pearl MD        bismuth subsalicylate (PEPTO BISMOL) chewable tablet 524 mg  2 tablet Oral Q1H PRN Sharona Pearl MD        calcium carbonate (TUMS) chewable tablet 1,000 mg  1,000 mg Oral 4x Daily PRN Sharona Pearl MD        calcium carbonate 500 mg (elemental) (OSCAL) tablet 500 mg  500 mg Oral Daily Sharona Pearl MD   500 mg at 04/16/25 1045    cefTRIAXone (ROCEPHIN) 2 g vial to attach to  ml bag for ADULTS or NS 50 ml bag for PEDS  2 g Intravenous Q24H Sharona Pearl MD        fluticasone (FLONASE) 50 MCG/ACT spray 2 spray  2 spray Both Nostrils Daily Sharona Pearl MD   2 spray at 04/16/25 1047    fluticasone-vilanterol (BREO ELLIPTA) 200-25 MCG/ACT inhaler 1 puff  1 puff Inhalation Daily Sharona Pearl MD   1 puff at 04/16/25 1046    gabapentin (NEURONTIN) capsule 100 mg  100 mg Oral BID Tone Zarco MD   100 mg at 04/16/25 1322    HYDROmorphone (DILAUDID) injection 0.2 mg  0.2 mg Intravenous Q6H PRN Tone Zarco MD        hydrOXYzine HCl (ATARAX) tablet 25 mg  25 mg Oral Q6H  PRN Tone Zarco MD        Or    hydrOXYzine HCl (ATARAX) tablet 50 mg  50 mg Oral Q6H PRN Tone Zarco MD        ipratropium (ATROVENT) 0.06 % spray 2 spray  2 spray Both Nostrils BID Sharona Pearl MD   2 spray at 04/16/25 1047    levothyroxine (SYNTHROID/LEVOTHROID) tablet 75 mcg  75 mcg Oral Daily Sharona Pearl MD   75 mcg at 04/16/25 1045    [START ON 4/17/2025] Lidocaine (LIDOCARE) 4 % Patch 1 patch  1 patch Transdermal Q24H Tone Zarco MD        lidocaine (LMX4) cream   Topical Q1H PRN Sharona Pearl MD        lidocaine 1 % 0.1-1 mL  0.1-1 mL Other Q1H PRN Sharona Pearl MD        methocarbamol (ROBAXIN) tablet 500 mg  500 mg Oral Q8H PRN Tone Zarco MD        naloxone (NARCAN) injection 0.2 mg  0.2 mg Intravenous Q2 Min PRN Sharona Pearl MD        Or    naloxone (NARCAN) injection 0.4 mg  0.4 mg Intravenous Q2 Min PRN Sharona Pearl MD        Or    naloxone (NARCAN) injection 0.2 mg  0.2 mg Intramuscular Q2 Min PRN Sharona Pearl MD        Or    naloxone (NARCAN) injection 0.4 mg  0.4 mg Intramuscular Q2 Min PRN Sharona Pearl MD        ondansetron (ZOFRAN ODT) ODT tab 4 mg  4 mg Oral Q6H PRN Sharona Pearl MD        Or    ondansetron (ZOFRAN) injection 4 mg  4 mg Intravenous Q6H PRN Sharona Pearl MD        oxyCODONE IR (ROXICODONE) half-tab 2.5 mg  2.5 mg Oral Q6H PRN Tone Zarco MD        polyethylene glycol (MIRALAX) Packet 17 g  17 g Oral BID PRN Sharona Pearl MD        prochlorperazine (COMPAZINE) injection 5 mg  5 mg Intravenous Q6H PRN Sharona Pearl MD        Or    prochlorperazine (COMPAZINE) tablet 5 mg  5 mg Oral Q6H PRN Sharona Pearl MD        senna-docusate (SENOKOT-S/PERICOLACE) 8.6-50 MG per tablet 2 tablet  2 tablet Oral BID Tone Zarco MD        sertraline (ZOLOFT) tablet 25 mg  25 mg Oral At Bedtime Sharona Pearl MD        sodium chloride (PF) 0.9% PF flush 3 mL  3 mL Intracatheter Q8H SUKI Sharona Pearl MD        sodium chloride (PF) 0.9% PF  flush 3 mL  3 mL Intracatheter q1 min prn Sharona Pearl MD        Vitamin D3 (CHOLECALCIFEROL) tablet 125 mcg  125 mcg Oral Daily Sharona Pearl MD   125 mcg at 04/16/25 1045             Review of Systems:   CV: NEGATIVE for chest pain, palpitations or peripheral edema  C: NEGATIVE for fever, chills, change in weight  E/M: NEGATIVE for ear, mouth and throat problems  R: NEGATIVE for significant cough           Physical Exam:   All vitals have been reviewed  Patient Vitals for the past 24 hrs:   BP Temp Temp src Pulse Resp SpO2   04/16/25 0740 108/80 98  F (36.7  C) Axillary 108 -- 92 %   04/16/25 0209 133/77 98.5  F (36.9  C) -- -- 20 (!) 91 %   04/16/25 0021 -- -- -- 106 23 94 %   04/16/25 0002 -- -- -- 115 23 (!) 75 %   04/15/25 2300 121/70 -- -- 111 23 --   04/15/25 2200 -- -- -- 109 26 94 %   04/15/25 1952 -- -- -- -- -- 94 %   04/15/25 1928 (!) 148/92 97.4  F (36.3  C) Oral 97 20 (!) 89 %       Intake/Output Summary (Last 24 hours) at 4/16/2025 1428  Last data filed at 4/16/2025 1338  Gross per 24 hour   Intake 60 ml   Output 520 ml   Net -460 ml       Constitutional: Pleasant, alert, appropriate, following commands.  HEENT: Head atraumatic normocephalic.   Respiratory: Unlabored breathing no audible wheeze  Cardiovascular: Regular rate and rhythm per pulses.  GI: Abdomen is non-distended.  Lymph/Hematologic: No lymphadenopathy in areas examined.  Genitourinary:  No luna  Skin: No rashes, no cyanosis, no edema.  Musculoskeletal:   On examination of LLE  Skin intact with no wounds, abrasions, or bruising  Minimal TTP of left hip and groin, tolerates rotation of the hip  Minimal erythema of the surrounding skin.   Bilateral calves are soft, non-tender.  Bilateral lower extremity is NVI.  Sensation intact bilateral lower extremities  5/5 motor with resisted dorsi and plantar flexion bilaterally  3+Dp pulse              Data:   All laboratory data reviewed  Results for orders placed or performed during the  hospital encounter of 04/15/25   XR Pelvis w Hip Left 1 View     Status: None    Narrative    EXAM: XR PELVIS AND HIP LEFT 1 VIEW  LOCATION: [facility]  DATE: 04/15/2025    INDICATION: Left hip pain after fall; hx of LEXIS, concern for periprosthetic hip fracture.  COMPARISON: 08/27/2019      Impression    IMPRESSION: Bones are markedly demineralized. Bilateral hip implants.    There is some soft tissue mineralization along the lateral aspect of the left thigh and proximal femur. Additionally, there is some subtle cortical undulation which is concerning but not completely diagnostic for a nondisplaced left proximal femoral   periprosthetic fracture. Recommend CT for further evaluation.    There is also some focal cortical irregularity along the left inferior pubic ramus, also concerning for a nondisplaced fracture.    Focal cortical thickening along the contralateral right inferior pubic ramus is favored to reflect remodeling from a chronic fracture.    NOTE: ABNORMAL REPORT    THE DICTATION ABOVE DESCRIBES AN ABNORMALITY FOR WHICH FOLLOWUP IS NEEDED.    CT Head w/o Contrast     Status: None    Narrative    EXAM: CT HEAD W/O CONTRAST  LOCATION: [facility]  DATE: 4/15/2025    INDICATION: Fall, closed head injury  COMPARISON: None.  TECHNIQUE: Routine CT Head without IV contrast. Multiplanar reformats. Dose reduction techniques were used.    FINDINGS:  INTRACRANIAL CONTENTS: No intracranial hemorrhage. Mild diffuse cerebral parenchymal volume loss. No midline shift. The basilar cisterns are patent. Mild periventricular and scattered foci of deep white matter hypodensities involving both cerebral   hemispheres. No CT evidence for an acute infarct.    VISUALIZED ORBITS/SINUSES/MASTOIDS: No intraorbital abnormality. No paranasal sinus mucosal disease. Mild nasoseptal bowing to the right anteriorly. No middle ear or mastoid effusion.    BONES/SOFT TISSUES: No acute  abnormality.      Impression    IMPRESSION:  1.  No intracranial hemorrhage, mass lesions, hydrocephalus or CT evidence for an acute infarct.  2.  Mild diffuse cerebral parenchymal volume loss. Presumed chronic hypertensive/microvascular ischemic white matter changes.     CT Cervical Spine w/o Contrast     Status: None    Narrative    EXAM: CT CERVICAL SPINE W/O CONTRAST  LOCATION: [facility]  DATE: 4/15/2025    INDICATION: Fall, neck pain  COMPARISON: None.  TECHNIQUE: Routine CT Cervical Spine without IV contrast. Multiplanar reformats. Dose reduction techniques were used.    FINDINGS:  VERTEBRA: 2 mm spondylolisthesis of C4 on C5. 2 mm retrolisthesis of C5 on C6. 1 mm spondylolisthesis of C7 on T1 and T1 on T2. Craniocervical junction is within normal limits. Vertebral body heights are maintained. No fractures.    CANAL/FORAMINA: Severe intervertebral disc height loss and endplate spurring at C5-C6. Mild spinal canal narrowing at C4-C5. Severe spinal canal narrowing at C5-C6.    Moderate right neuroforaminal narrowing at C3-C4. Mild left neuroforaminal narrowing at C4-C5. Moderate to severe bilateral neuroforaminal narrowing at C5-C6.    PARASPINAL: Mild symmetric atrophy of the posterior paraspinal musculature. The lung apices are clear.      Impression    IMPRESSION:  1.  No fracture or posttraumatic subluxation.  2.  Severe spinal canal narrowing at C5-C6.  3.  Moderate to severe bilateral neuroforaminal narrowing at C5-C6.     CT Chest Pulmonary Embolism w Contrast     Status: None    Narrative    EXAM: CT CHEST PULMONARY EMBOLISM W CONTRAST  LOCATION: [facility]  DATE: 4/15/2025    INDICATION: Fall, hypoxia  COMPARISON: CT chest, abdomen and pelvis 5/16/2011  TECHNIQUE: CT chest pulmonary angiogram during arterial phase injection of IV contrast. Multiplanar reformats and MIP reconstructions were performed. Dose reduction techniques were used.   CONTRAST:  53mL Isovue 370    FINDINGS:  ANGIOGRAM CHEST: Pulmonary arteries are normal caliber and negative for pulmonary emboli. Within limitations from contrast timing, no convincing evidence of dissection. No CT evidence of right heart strain.    LUNGS AND PLEURA: Scattered mucus plugging and airway centric nodular opacities and tree-in-bud. No lobar consolidation. Unchanged biapical scarring. No pleural effusion or pneumothorax.    MEDIASTINUM/AXILLAE: Nonenlarged heart. No pericardial effusion. Calcified mediastinal lymphadenopathy from prior granulomatous disease.    CORONARY ARTERY CALCIFICATION: Severe.    UPPER ABDOMEN: No acute findings.    MUSCULOSKELETAL: Moderate T8, severe T9 and L1 compression deformities appear chronic. Numerous chronic appearing bilateral rib deformities.      Impression    IMPRESSION:  1.  No pulmonary embolism or acute traumatic findings in the chest.  2.  Findings suggesting airway infection/inflammation as described, which can be seen in the setting of mycobacterium avium intracellulare.  3.  Additional chronic noncritical details in the findings.   CT Hip Left w/o Contrast     Status: None    Narrative    EXAM: CT HIP LEFT W/O CONTRAST  LOCATION: [facility]  DATE: 4/15/2025    INDICATION: Non diagnostic imaging on x-ray, radiology recommended CT for possible periprosthetic hip fracture.  COMPARISON:  Same day radiograph. 08/27/2019.  TECHNIQUE: Noncontrast. Axial, sagittal and coronal thin-section reconstruction. Dose reduction techniques were used.     FINDINGS:     Bones are markedly demineralized and there are changes of a left hip implant. Distal tip of the stem is excluded from the field-of-view. Minimally displaced acute appearing left inferior pubic ramus fracture. Additional nondisplaced left superior   parasymphyseal fracture.    There is some focal cortical irregularity along the left sacral ala probably representing a fracture. There is no evidence of  a dislocation.    Cement versus mineralization along the lateral margin of the soft tissues of the proximal femur again seen. Previously seen irregularity in the region was summation artifact as there is no evidence for an acute proximal femoral periprosthetic fracture.    Atherosclerotic vascular calcifications. Visualized portions of the colon show colonic diverticulosis. There is no large soft tissue hematoma.    Remote healed fracture deformity.      Impression     IMPRESSION:  1.  Left hip implant. No evidence of an acute displaced periprosthetic fracture involving the proximal femur. Previously seen irregularity on radiograph was likely summation artifact. Note is made that the distal tip of the stem is excluded from the   field-of-view.    2.  Acute nondisplaced to minimally displaced left pubic rami fractures.    3.  Probable nondisplaced left sacral alar fracture.    4.  Bones are markedly demineralized.        NOTE: ABNORMAL REPORT    THE DICTATION ABOVE DESCRIBES AN ABNORMALITY FOR WHICH FOLLOW-UP IS NEEDED.          Comprehensive metabolic panel     Status: Abnormal   Result Value Ref Range    Sodium 138 135 - 145 mmol/L    Potassium 4.3 3.4 - 5.3 mmol/L    Carbon Dioxide (CO2) 26 22 - 29 mmol/L    Anion Gap 11 7 - 15 mmol/L    Urea Nitrogen 15.7 8.0 - 23.0 mg/dL    Creatinine 0.62 0.51 - 0.95 mg/dL    GFR Estimate 82 >60 mL/min/1.73m2    Calcium 9.8 8.8 - 10.4 mg/dL    Chloride 101 98 - 107 mmol/L    Glucose 109 (H) 70 - 99 mg/dL    Alkaline Phosphatase 69 40 - 150 U/L    AST 29 0 - 45 U/L    ALT 31 0 - 50 U/L    Protein Total 7.0 6.4 - 8.3 g/dL    Albumin 3.7 3.5 - 5.2 g/dL    Bilirubin Total 0.5 <=1.2 mg/dL   Troponin T, High Sensitivity     Status: Abnormal   Result Value Ref Range    Troponin T, High Sensitivity 21 (H) <=14 ng/L   CBC with platelets and differential     Status: Abnormal   Result Value Ref Range    WBC Count 11.9 (H) 4.0 - 11.0 10e3/uL    RBC Count 4.38 3.80 - 5.20 10e6/uL     Hemoglobin 13.7 11.7 - 15.7 g/dL    Hematocrit 41.9 35.0 - 47.0 %    MCV 96 78 - 100 fL    MCH 31.3 26.5 - 33.0 pg    MCHC 32.7 31.5 - 36.5 g/dL    RDW 12.9 10.0 - 15.0 %    Platelet Count 233 150 - 450 10e3/uL    % Neutrophils 91 %    % Lymphocytes 4 %    % Monocytes 3 %    % Eosinophils 1 %    % Basophils 0 %    % Immature Granulocytes 2 %    NRBCs per 100 WBC 0 <1 /100    Absolute Neutrophils 10.8 (H) 1.6 - 8.3 10e3/uL    Absolute Lymphocytes 0.5 (L) 0.8 - 5.3 10e3/uL    Absolute Monocytes 0.3 0.0 - 1.3 10e3/uL    Absolute Eosinophils 0.1 0.0 - 0.7 10e3/uL    Absolute Basophils 0.0 0.0 - 0.2 10e3/uL    Absolute Immature Granulocytes 0.2 <=0.4 10e3/uL    Absolute NRBCs 0.0 10e3/uL   UA with Microscopic reflex to Culture     Status: Abnormal    Specimen: Urine, Clean Catch   Result Value Ref Range    Color Urine Light Yellow Colorless, Straw, Light Yellow, Yellow    Appearance Urine Clear Clear    Glucose Urine Negative Negative mg/dL    Bilirubin Urine Negative Negative    Ketones Urine Trace (A) Negative mg/dL    Specific Gravity Urine 1.027 1.003 - 1.035    Blood Urine Negative Negative    pH Urine 7.0 5.0 - 7.0    Protein Albumin Urine Negative Negative mg/dL    Urobilinogen Urine Normal Normal mg/dL    Nitrite Urine Negative Negative    Leukocyte Esterase Urine Trace (A) Negative    RBC Urine 1 <=2 /HPF    WBC Urine 7 (H) <=5 /HPF    Narrative    Urine Culture not indicated   Nt probnp inpatient (BNP)     Status: Normal   Result Value Ref Range    N terminal Pro BNP Inpatient 218 0 - 1,800 pg/mL   Creatinine POCT     Status: Normal   Result Value Ref Range    Creatinine POCT 0.6 0.5 - 1.0 mg/dL    GFR, ESTIMATED POCT >60 >60 mL/min/1.73m2    Narrative    Reference intervals for this test were updated on 03/10/2025 to standardize reference intervals for creatinine measured by different instruments. There may be differences in the flagging of prior results with similar values performed with this method. Those  prior results can be interpreted in the context of the updated reference intervals.   iStat Gases (lactate) venous, POCT     Status: Abnormal   Result Value Ref Range    Lactic Acid POCT 0.6 (L) 0.7 - 2.0 mmol/L    Bicarbonate Venous POCT 29 (H) 21 - 28 mmol/L    O2 Sat, Venous POCT 7 (L) 70 - 75 %    pCO2 Venous POCT 51 (H) 40 - 50 mm Hg    pH Venous POCT 7.37 7.32 - 7.43    pO2 Venous POCT 9 (L) 25 - 47 mm Hg    Base Excess/Deficit (+/-) POCT 3.0 -3.0 - 3.0 mmol/L   Troponin T, High Sensitivity     Status: Abnormal   Result Value Ref Range    Troponin T, High Sensitivity 21 (H) <=14 ng/L   Procalcitonin     Status: Normal   Result Value Ref Range    Procalcitonin 0.10 <0.50 ng/mL   Basic metabolic panel     Status: Abnormal   Result Value Ref Range    Sodium 140 135 - 145 mmol/L    Potassium 3.7 3.4 - 5.3 mmol/L    Chloride 103 98 - 107 mmol/L    Carbon Dioxide (CO2) 24 22 - 29 mmol/L    Anion Gap 13 7 - 15 mmol/L    Urea Nitrogen 13.5 8.0 - 23.0 mg/dL    Creatinine 0.62 0.51 - 0.95 mg/dL    GFR Estimate 82 >60 mL/min/1.73m2    Calcium 9.0 8.8 - 10.4 mg/dL    Glucose 126 (H) 70 - 99 mg/dL   CBC with platelets     Status: Abnormal   Result Value Ref Range    WBC Count 16.9 (H) 4.0 - 11.0 10e3/uL    RBC Count 4.32 3.80 - 5.20 10e6/uL    Hemoglobin 13.4 11.7 - 15.7 g/dL    Hematocrit 40.9 35.0 - 47.0 %    MCV 95 78 - 100 fL    MCH 31.0 26.5 - 33.0 pg    MCHC 32.8 31.5 - 36.5 g/dL    RDW 13.0 10.0 - 15.0 %    Platelet Count 226 150 - 450 10e3/uL   EKG 12 lead     Status: None   Result Value Ref Range    Systolic Blood Pressure  mmHg    Diastolic Blood Pressure  mmHg    Ventricular Rate 95 BPM    Atrial Rate 95 BPM    KS Interval 220 ms    QRS Duration 76 ms     ms    QTc 457 ms    P Axis 58 degrees    R AXIS -39 degrees    T Axis 28 degrees    Interpretation ECG       Sinus rhythm with 1st degree A-V block with Premature ventricular complexes or Fusion complexes  Possible Left atrial enlargement  Left axis  deviation  Inferior infarct , age undetermined  Abnormal ECG  When compared with ECG of 30-Jun-2019 14:28,  Significant changes have occurred  Confirmed by GENERATED REPORT, COMPUTER ([id number]),  Ina Albert ([id number]) on 4/15/2025 8:47:08 PM     Blood Culture Peripheral Blood     Status: Normal (Preliminary result)    Specimen: Peripheral Blood   Result Value Ref Range    Culture No growth after 12 hours    CBC with platelets differential     Status: Abnormal    Narrative    The following orders were created for panel order CBC with platelets differential.  Procedure                               Abnormality         Status                     ---------                               -----------         ------                     CBC with platelets and ...[[id number]]  Abnormal            Final result                 Please view results for these tests on the individual orders.          Attestation:  Amount of time performed on this consult: 50 minutes.    Glendy Nance PA-C  Hospital Rounder [facility] Trauma Team   [facility]  Non-operative provider (Floor ROMI/ Trauma Rounds)

## 2025-04-16 NOTE — H&P
[facility]    History and Physical - Hospitalist Service       Date of Admission:  4/15/2025    Assessment & Plan      Rayne Mabry is a 89 year old female admitted on 4/15/2025. 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  As per patient's family patient received a phone call and was walking fast from a room to a different room to answer the call and might have tripped on the cord and fell.  No dizziness chest pain or shortness of breath prior to the fall.  After the Fall she could not bear weight on the left leg.  She was complaining of left upper hip pain.  EMS were called.  And they gave her 50 mcg of fentanyl for pain.  She was also noted to have abrasion on her right lower extremity.  Patient was noted to be mildly hypoxic 85% on room air was put on 4 L of oxygen oxygen by nasal cannula and oxygen improved to the 90s.  As per the family she has chronic mildly low oxygen levels and her oxygenation usually in the high 80s.  He is not on oxygen at baseline.  At baseline she is independent and walks with a walker.  Has history of dementia and is very forgetful as per the family    She was evaluated by Dr. Carter Garcia in the emergency room    Her vital signs showed temperature showed 97.4  F afebrile pulse rate 97 blood pressure 148/92 respirate of 20 she was satting 89% on 4 L by nasal cannula and with 6 L of oxygen improved to 94%.  Her CBC showed WBC count elevated at 11.9 hemoglobin 13.7 platelet count at 233.  Lactate normal at 0.6.  pH on VBG 7.37 normal pCO2 mildly elevated at 51  oxygen saturation 7.  Bicarb 29 PO29.  Troponin at 21.  Nitro BNP normal at 218.  Sodium normal at 138 potassium normal at 4.3 bicarb normal at 26 anion gap of 11 BUN 15.7 creatinine 0.62 glucose normal at 109 all her other LFTs are normal with alk phos of 69 AST 29 ALT 31 albumin 3.7 total bilirubin 0.5.  Repeat troponin pending.  Blood culture x 1 done.  Procalcitonin pending.  X-ray of the pelvis showed there is soft tissue mineralization along the lateral aspect of the left thigh and proximal femur.  Additionally there is some subtle cortical angulation which is concerning but not completely diagnostic for a nondisplaced left proximal femoral periprosthetic proximal fracture.  Recommend CT further further evaluation.  CT left hip without contrast was done for further evaluation and it showed left hip implant.  No evidence of any acute or displaced periprosthetic fracture involving the proximal femur.  Previously seen irregularity on x-ray was likely summation artifact.  Note is made that the distal tip of the stem is excluded from the field-of-view.  Acute nondisplaced due to minimally displaced left-sided pubic rami fractures seen.  Probable nondisplaced left sacral rober fracture.  Bones are markedly demineralized.  CT head without contrast was done and it showed no intracranial hemorrhage, mass lesions, hydrocephalus or CT evidence for an acute infarct.  Mild diffuse cerebral parenchymal volume loss.  Presumed chronic hypertensive/microvascular ischemic white matter changes.    CT chest PE with contrast was done and showed no PE or acute traumatic findings in the chest.  Findings suggestive of airway infection/inflammation as described, which can be seen in the setting of Mycobacterium AVM intracellular.  Scattered mucous plugging and airway centric nodular opacities and tree-in-bud.  No lobar consolidation.  Unchanged biapical scarring.  No pleural effusion or pneumothorax.    Patient was given 1 L normal  saline fluid bolus and request to hospitalization was made for possible community-acquired pneumonia with hypoxia and left-sided pubic rami and sacral rober fractures    Status post mechanical fall  Acute nondisplaced to minimally displaced left pubic rami fractures  Probable nondisplaced left sacral rober fractures  History of osteoporosis  CT head without contrast in the ED was negative except brain atrophy  CT cervical spine showed severe spinal canal narrowing at C5-C6.  Moderate to severe bilateral neuroforaminal narrowing at C5-C6  Admitted to the hospital under inpatient status  Pain control with scheduled Tylenol, scheduled Robaxin, as needed tramadol for moderate pain, as needed low-dose oxycodone 2.5 mg for severe pain, Dilaudid low-dose for breakthrough pain ordered  Orthopedic surgery consultation will be requested  PT, social work and care coordinator consultations will be requested for safe discharge disposition  Hold alendronate  Continue calcium and vitamin D supplementation    Acute hypoxic respiratory failure needing oxygen by nasal cannula;  Possible community-acquired pneumonia  History of Mycobacterium AviuM complex infection in 1996 treated with no recurrence  History of bronchiectasis  Patient with worsening hypoxia needing 4 L of oxygen by nasal cannula in the ED  Chest CT showed no PE but it showed scattered mucous plugging and airway centric nodular opacities and tree-in-bud appearance.  Started on IV ceftriaxone and Zithromax in the ED which will be continued  Procalcitonin is ordered and results are currently pending  As per the family patient was treated for Mycobacterium avium complex infection at [facility] with no recurrence so far  She does have chronic scarring and changes of MAIC infection on her lungs   continue PTA Advair with substitution in the hospital    History of hypothyroidism;  Continue levothyroxine    History of polyarteritis nodosa biopsy-proven;  History of neuropathy  secondary to above;  Continue Imuran and gabapentin      History of anxiety;  Continue Zoloft    History of dementia;  At risk of delirium   Maintain sleep-wake cycle  Avoid narcotics     Diet:  Regular diet  DVT Prophylaxis: Pneumatic Compression Devices  Rivas Catheter: Not present  Lines: None     Cardiac Monitoring: Ordered  Code Status:  Discussed with family on admission they want her to be DNR/DNI    Clinically Significant Risk Factors Present on Admission                 # Drug Induced Platelet Defect: home medication list includes an antiplatelet medication                        Disposition Plan         Medically Ready for Discharge: Anticipated in 2-4 Days           Sharona Pearl MD  Hospitalist Service  [facility]  Securely message with [employer] (more info)  Text page via [facility] Paging/Directory     ______________________________________________________________________    Chief Complaint   Post a mechanical fall, left-sided hip pain after fall, congested cough    History is obtained from the patient's daughter and both sons at bedside, ED physician Dr. 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  As per patient's family patient received a phone call and was walking fast from a room to a different room to answer the call and might have tripped on the cord and fell.  No dizziness chest pain or shortness of breath prior to the fall.  After  the Fall she could not bear weight on the left leg.  She was complaining of left upper hip pain.  EMS were called.  And they gave her 50 mcg of fentanyl for pain.  She was also noted to have abrasion on her right lower extremity.  Patient was noted to be mildly hypoxic 85% on room air was put on 4 L of oxygen oxygen by nasal cannula and oxygen improved to the 90s.  As per the family she has chronic mildly low oxygen levels and her oxygenation usually in the high 80s.  He is not on oxygen at baseline.  At baseline she is independent and walks with a walker.  Has history of dementia and is very forgetful as per the family    She was evaluated by Dr. Carter Garcia in the emergency room    Her vital signs showed temperature showed 97.4  F afebrile pulse rate 97 blood pressure 148/92 respirate of 20 she was satting 89% on 4 L by nasal cannula and with 6 L of oxygen improved to 94%.  Her CBC showed WBC count elevated at 11.9 hemoglobin 13.7 platelet count at 233.  Lactate normal at 0.6.  pH on VBG 7.37 normal pCO2 mildly elevated at 51 oxygen saturation 7.  Bicarb 29 PO29.  Troponin at 21.  Nitro BNP normal at 218.  Sodium normal at 138 potassium normal at 4.3 bicarb normal at 26 anion gap of 11 BUN 15.7 creatinine 0.62 glucose normal at 109 all her other LFTs are normal with alk phos of 69 AST 29 ALT 31 albumin 3.7 total bilirubin 0.5.  Repeat troponin pending.  Blood culture x 1 done.  Procalcitonin pending.  X-ray of the pelvis showed there is soft tissue mineralization along the lateral aspect of the left thigh and proximal femur.  Additionally there is some subtle cortical angulation which is concerning but not completely diagnostic for a nondisplaced left proximal femoral periprosthetic proximal fracture.  Recommend CT further further evaluation.  CT left hip without contrast was done for further evaluation and it showed left hip implant.  No evidence of any acute or displaced periprosthetic fracture involving the  proximal femur.  Previously seen irregularity on x-ray was likely summation artifact.  Note is made that the distal tip of the stem is excluded from the field-of-view.  Acute nondisplaced due to minimally displaced left-sided pubic rami fractures seen.  Probable nondisplaced left sacral rober fracture.  Bones are markedly demineralized.  CT head without contrast was done and it showed no intracranial hemorrhage, mass lesions, hydrocephalus or CT evidence for an acute infarct.  Mild diffuse cerebral parenchymal volume loss.  Presumed chronic hypertensive/microvascular ischemic white matter changes.    CT chest PE with contrast was done and showed no PE or acute traumatic findings in the chest.  Findings suggestive of airway infection/inflammation as described, which can be seen in the setting of Mycobacterium AVM intracellular.  Scattered mucous plugging and airway centric nodular opacities and tree-in-bud.  No lobar consolidation.  Unchanged biapical scarring.  No pleural effusion or pneumothorax.    Patient was given 1 L normal saline fluid bolus and request to hospitalization was made for possible community-acquired pneumonia with hypoxia and left-sided pubic rami and sacral rober fractures      Past Medical History    Past Medical History:   Diagnosis Date    Acute CHF (H)     Anxiety     Arthritis     rheumatoid arthritis    Chronic anemia     Closed left hip fracture (H)     Depression     Fall 07/01/2019    Mechanical fall with left hip fracture - s/p percutaneous fixation     GERD (gastroesophageal reflux disease)     Hypoxia      Hypoxia, suspect atelectasis related to recent surgery.    MAC (mycobacterium avium-intracellulare complex)     Mild cognitive disorder     Polyarteritis nodosa (H)     Polyneuropathy     Thyroid disease     Hypothyroidism.      UTI (urinary tract infection) 07/04/2019    RECENT       Past Surgical History   Past Surgical History:   Procedure Laterality Date    ARTHROPLASTY HIP Left  8/27/2019    Procedure: LEFT HIP UYDITH ARTHROPLASTY,  WITH PROXIMAL FEMUR HARDWARE REMOVAL;  Surgeon: Thaddeus Barger MD;  Location:  OR    CLOSED REDUCTION, PERCUTANEOUS PINNING HIP Left 7/1/2019    Procedure: PERCUTANEOUS FIXATION OF LEFT HIP WITH SYNTHES FEMORAL NECK SYSTEM;  Surgeon: Axel Curtis MD;  Location:  OR    GYN SURGERY      hyster    HIP SURGERY Right     HYSTERECTOMY      LEFT HIP FX      s/p percutaneous fixation   07/01/2019       Prior to Admission Medications   Prior to Admission Medications   Prescriptions Last Dose Informant Patient Reported? Taking?   acetaminophen (TYLENOL) 325 MG tablet   Yes Yes   Sig: Take 650 mg by mouth 2 times daily.   albuterol (PROAIR HFA/PROVENTIL HFA/VENTOLIN HFA) 108 (90 BASE) MCG/ACT Inhaler  Nursing Home Yes No   Sig: Inhale 2 puffs into the lungs every 4 hours as needed    aspirin (ASA) 325 MG EC tablet   No No   Sig: Take 1 tablet (325 mg) by mouth daily   azaTHIOprine (IMURAN) 50 MG tablet  Nursing Home Yes No   Sig: Take 150 mg by mouth daily   bismuth subsalicylate (PEPTO-BISMOL) 262 MG chewable tablet  Nursing Home Yes No   Sig: Take 2 tablets by mouth every hour as needed (max 8 tablets/24 hours)   calcium carbonate 500 mg, elemental, (OSCAL;OYSTER SHELL CALCIUM) 500 MG tablet  Nursing Home Yes No   Sig: Take 500 mg by mouth 2 times daily On tuesdays   ergocalciferol (ERGOCALCIFEROL) 58756 units capsule  Nursing Home Yes No   Sig: Take 50,000 Units by mouth once a week Every Tuesday   fluticasone (FLONASE) 50 MCG/ACT nasal spray  Nursing Home Yes No   Sig: Spray 2 sprays into both nostrils daily   levothyroxine (SYNTHROID/LEVOTHROID) 88 MCG tablet  Nursing Home Yes No   Sig: Take 88 mcg by mouth daily    melatonin 3 MG tablet  Nursing Home Yes No   Sig: Take 3 mg by mouth At Bedtime    omeprazole (PRILOSEC) 40 MG DR capsule   No No   Sig: Take 1 capsule (40 mg) by mouth daily   senna (SENOKOT) 8.6 MG tablet  Nursing Home Yes No    Sig: Take 1 tablet by mouth daily as needed for constipation   sertraline (ZOLOFT) 50 MG tablet  Nursing Home Yes No   Sig: Take 50 mg by mouth At Bedtime       Facility-Administered Medications: None        Review of Systems    The 10 point Review of Systems is negative other than noted in the HPI or here.     Social History   I have reviewed this patient's social history and updated it with pertinent information if needed.  Social History     Tobacco Use    Smoking status: Never    Smokeless tobacco: Never   Substance Use Topics    Alcohol use: Yes     Comment: 1 glass wine a night         Family History     No significant family history      Allergies   Allergies   Allergen Reactions    Zolpidem Other (See Comments)     Confusion even with low dose.        Physical Exam   Vital Signs: Temp: 97.4  F (36.3  C) Temp src: Oral BP: (!) 148/92 Pulse: 97   Resp: 20 SpO2: 94 % O2 Device: Nasal cannula Oxygen Delivery: 6 LPM  Weight: 0 lbs 0 oz    General Appearance: Frail elderly woman resting comfortably in bed, not in acute distress  Eyes: Eyes and eyelids are normal, no scleral icterus or conjunctival injection noted  HEENT: Oropharyngeal exam showed moist mucous membranes, extraocular eye movements are intact  Respiratory: Coarse breath sounds bilaterally on auscultation, no wheezing heard, no respiratory distress or accessory muscle use noted, on 6 L of oxygen by nasal cannula currently  Cardiovascular: Normal rate rhythm, normal S1-S2  GI: Soft, nontender, nondistended, bowel sounds positive  Skin: Right lower extremity skin abrasion noted  Musculoskeletal: There is decreased range of motion of the left hip.  No clubbing cyanosis or edema of the lower extremities noted  Neurologic: Alert, generalized weakness noted  Psychiatric: Resting comfortably in bed calm and cooperative    Medical Decision Making       85 MINUTES SPENT BY ME on the date of service doing chart review, history, exam, documentation & further  activities per the note.      Data     I have personally reviewed the following data over the past 24 hrs:    11.9 (H)  \   13.7   / 233     138 101 15.7 /  109 (H)   4.3 26 0.6 \     ALT: 31 AST: 29 AP: 69 TBILI: 0.5   ALB: 3.7 TOT PROTEIN: 7.0 LIPASE: N/A     Trop: 21 (H) BNP: 218     Procal: N/A CRP: N/A Lactic Acid: 0.6 (L)         Imaging results reviewed over the past 24 hrs:   Recent Results (from the past 24 hours)   XR Pelvis w Hip Left 1 View    Narrative    EXAM: XR PELVIS AND HIP LEFT 1 VIEW  LOCATION: [facility]  DATE: 04/15/2025    INDICATION: Left hip pain after fall; hx of LEXIS, concern for periprosthetic hip fracture.  COMPARISON: 08/27/2019      Impression    IMPRESSION: Bones are markedly demineralized. Bilateral hip implants.    There is some soft tissue mineralization along the lateral aspect of the left thigh and proximal femur. Additionally, there is some subtle cortical undulation which is concerning but not completely diagnostic for a nondisplaced left proximal femoral   periprosthetic fracture. Recommend CT for further evaluation.    There is also some focal cortical irregularity along the left inferior pubic ramus, also concerning for a nondisplaced fracture.    Focal cortical thickening along the contralateral right inferior pubic ramus is favored to reflect remodeling from a chronic fracture.    NOTE: ABNORMAL REPORT    THE DICTATION ABOVE DESCRIBES AN ABNORMALITY FOR WHICH FOLLOWUP IS NEEDED.    CT Cervical Spine w/o Contrast    Narrative    EXAM: CT CERVICAL SPINE W/O CONTRAST  LOCATION: [facility]  DATE: 4/15/2025    INDICATION: Fall, neck pain  COMPARISON: None.  TECHNIQUE: Routine CT Cervical Spine without IV contrast. Multiplanar reformats. Dose reduction techniques were used.    FINDINGS:  VERTEBRA: 2 mm spondylolisthesis of C4 on C5. 2 mm retrolisthesis of C5 on C6. 1 mm spondylolisthesis of C7 on T1 and T1 on T2. Craniocervical  junction is within normal limits. Vertebral body heights are maintained. No fractures.    CANAL/FORAMINA: Severe intervertebral disc height loss and endplate spurring at C5-C6. Mild spinal canal narrowing at C4-C5. Severe spinal canal narrowing at C5-C6.    Moderate right neuroforaminal narrowing at C3-C4. Mild left neuroforaminal narrowing at C4-C5. Moderate to severe bilateral neuroforaminal narrowing at C5-C6.    PARASPINAL: Mild symmetric atrophy of the posterior paraspinal musculature. The lung apices are clear.      Impression    IMPRESSION:  1.  No fracture or posttraumatic subluxation.  2.  Severe spinal canal narrowing at C5-C6.  3.  Moderate to severe bilateral neuroforaminal narrowing at C5-C6.     CT Chest Pulmonary Embolism w Contrast    Narrative    EXAM: CT CHEST PULMONARY EMBOLISM W CONTRAST  LOCATION: [facility]  DATE: 4/15/2025    INDICATION: Fall, hypoxia  COMPARISON: CT chest, abdomen and pelvis 5/16/2011  TECHNIQUE: CT chest pulmonary angiogram during arterial phase injection of IV contrast. Multiplanar reformats and MIP reconstructions were performed. Dose reduction techniques were used.   CONTRAST: 53mL Isovue 370    FINDINGS:  ANGIOGRAM CHEST: Pulmonary arteries are normal caliber and negative for pulmonary emboli. Within limitations from contrast timing, no convincing evidence of dissection. No CT evidence of right heart strain.    LUNGS AND PLEURA: Scattered mucus plugging and airway centric nodular opacities and tree-in-bud. No lobar consolidation. Unchanged biapical scarring. No pleural effusion or pneumothorax.    MEDIASTINUM/AXILLAE: Nonenlarged heart. No pericardial effusion. Calcified mediastinal lymphadenopathy from prior granulomatous disease.    CORONARY ARTERY CALCIFICATION: Severe.    UPPER ABDOMEN: No acute findings.    MUSCULOSKELETAL: Moderate T8, severe T9 and L1 compression deformities appear chronic. Numerous chronic appearing bilateral rib  deformities.      Impression    IMPRESSION:  1.  No pulmonary embolism or acute traumatic findings in the chest.  2.  Findings suggesting airway infection/inflammation as described, which can be seen in the setting of mycobacterium avium intracellulare.  3.  Additional chronic noncritical details in the findings.   CT Head w/o Contrast    Narrative    EXAM: CT HEAD W/O CONTRAST  LOCATION: [facility]  DATE: 4/15/2025    INDICATION: Fall, closed head injury  COMPARISON: None.  TECHNIQUE: Routine CT Head without IV contrast. Multiplanar reformats. Dose reduction techniques were used.    FINDINGS:  INTRACRANIAL CONTENTS: No intracranial hemorrhage. Mild diffuse cerebral parenchymal volume loss. No midline shift. The basilar cisterns are patent. Mild periventricular and scattered foci of deep white matter hypodensities involving both cerebral   hemispheres. No CT evidence for an acute infarct.    VISUALIZED ORBITS/SINUSES/MASTOIDS: No intraorbital abnormality. No paranasal sinus mucosal disease. Mild nasoseptal bowing to the right anteriorly. No middle ear or mastoid effusion.    BONES/SOFT TISSUES: No acute abnormality.      Impression    IMPRESSION:  1.  No intracranial hemorrhage, mass lesions, hydrocephalus or CT evidence for an acute infarct.  2.  Mild diffuse cerebral parenchymal volume loss. Presumed chronic hypertensive/microvascular ischemic white matter changes.     CT Hip Left w/o Contrast    Narrative    EXAM: CT HIP LEFT W/O CONTRAST  LOCATION: [facility]  DATE: 4/15/2025    INDICATION: Non diagnostic imaging on x-ray, radiology recommended CT for possible periprosthetic hip fracture.  COMPARISON:  Same day radiograph. 08/27/2019.  TECHNIQUE: Noncontrast. Axial, sagittal and coronal thin-section reconstruction. Dose reduction techniques were used.     FINDINGS:     Bones are markedly demineralized and there are changes of a left hip implant. Distal tip of  the stem is excluded from the field-of-view. Minimally displaced acute appearing left inferior pubic ramus fracture. Additional nondisplaced left superior   parasymphyseal fracture.    There is some focal cortical irregularity along the left sacral ala probably representing a fracture. There is no evidence of a dislocation.    Cement versus mineralization along the lateral margin of the soft tissues of the proximal femur again seen. Previously seen irregularity in the region was summation artifact as there is no evidence for an acute proximal femoral periprosthetic fracture.    Atherosclerotic vascular calcifications. Visualized portions of the colon show colonic diverticulosis. There is no large soft tissue hematoma.    Remote healed fracture deformity.      Impression     IMPRESSION:  1.  Left hip implant. No evidence of an acute displaced periprosthetic fracture involving the proximal femur. Previously seen irregularity on radiograph was likely summation artifact. Note is made that the distal tip of the stem is excluded from the   field-of-view.    2.  Acute nondisplaced to minimally displaced left pubic rami fractures.    3.  Probable nondisplaced left sacral alar fracture.    4.  Bones are markedly demineralized.        NOTE: ABNORMAL REPORT    THE DICTATION ABOVE DESCRIBES AN ABNORMALITY FOR WHICH FOLLOW-UP IS NEEDED.

## 2025-04-16 NOTE — ED TRIAGE NOTES
Patient presents to the ED via EMS from assisted living for unwitnessed mechanical fall. Found left lateral position for fire, unable to bare weight on left leg.  EMS gave 50mcg fentanyl. On 4L nasal cannula on arrival, not on oxygen baseline.

## 2025-04-16 NOTE — ED NOTES
[facility]  ED Nurse Handoff Report    ED Chief complaint: Fall      ED Diagnosis:   Final diagnoses:   Unwitnessed fall   Periprosthetic fracture of hip, initial encounter   Mycobacterium avium-intracellulare infection (H)   Acute respiratory failure with hypoxia (H)       Code Status: DNR / DNI    Allergies:   Allergies   Allergen Reactions    Zolpidem Other (See Comments)     Confusion even with low dose.       Patient Story: riley presents to the ED via EMS from assisted living for unwitnessed mechanical fall. Found left lateral position for fire, unable to bare weight on left leg. Possible head strike, not on thinners. EMS gave 50mcg fentanyl. On 4L nasal cannula on arrival, not on oxygen baseline.   Focused Assessment:     Treatments and/or interventions provided: PIV, labs, imaging, meds  Patient's response to treatments and/or interventions: pain controlled with meds  Labs Ordered and Resulted from Time of ED Arrival to Time of ED Departure   COMPREHENSIVE METABOLIC PANEL - Abnormal       Result Value    Sodium 138      Potassium 4.3      Carbon Dioxide (CO2) 26      Anion Gap 11      Urea Nitrogen 15.7      Creatinine 0.62      GFR Estimate 82      Calcium 9.8      Chloride 101      Glucose 109 (*)     Alkaline Phosphatase 69      AST 29      ALT 31      Protein Total 7.0      Albumin 3.7      Bilirubin Total 0.5     TROPONIN T, HIGH SENSITIVITY - Abnormal    Troponin T, High Sensitivity 21 (*)    CBC WITH PLATELETS AND DIFFERENTIAL - Abnormal    WBC Count 11.9 (*)     RBC Count 4.38      Hemoglobin 13.7      Hematocrit 41.9      MCV 96      MCH 31.3      MCHC 32.7      RDW 12.9      Platelet Count 233      % Neutrophils 91      % Lymphocytes 4      % Monocytes 3      % Eosinophils 1      % Basophils 0      % Immature Granulocytes 2      NRBCs per 100 WBC 0      Absolute Neutrophils 10.8 (*)     Absolute Lymphocytes 0.5 (*)     Absolute Monocytes 0.3      Absolute Eosinophils 0.1       Absolute Basophils 0.0      Absolute Immature Granulocytes 0.2      Absolute NRBCs 0.0     ISTAT GASES LACTATE VENOUS POCT - Abnormal    Lactic Acid POCT 0.6 (*)     Bicarbonate Venous POCT 29 (*)     O2 Sat, Venous POCT 7 (*)     pCO2 Venous POCT 51 (*)     pH Venous POCT 7.37      pO2 Venous POCT 9 (*)     Base Excess/Deficit (+/-) POCT 3.0     NT PROBNP INPATIENT - Normal    N terminal Pro BNP Inpatient 218     ISTAT CREATININE POCT - Normal    Creatinine POCT 0.6      GFR, ESTIMATED POCT >60     ROUTINE UA WITH MICROSCOPIC REFLEX TO CULTURE   TROPONIN T, HIGH SENSITIVITY   PROCALCITONIN   ISTAT CREATININE POCT   BLOOD CULTURE   BLOOD CULTURE     CT Hip Left w/o Contrast   Preliminary Result    IMPRESSION:   1.  Left hip implant. No evidence of an acute displaced periprosthetic fracture involving the proximal femur. Previously seen irregularity on radiograph was likely summation artifact. Note is made that the distal tip of the stem is excluded from the    field-of-view.      2.  Acute nondisplaced to minimally displaced left pubic rami fractures.      3.  Probable nondisplaced left sacral alar fracture.      4.  Bones are markedly demineralized.            NOTE: ABNORMAL REPORT      THE DICTATION ABOVE DESCRIBES AN ABNORMALITY FOR WHICH FOLLOW-UP IS NEEDED.                CT Head w/o Contrast   Final Result   IMPRESSION:   1.  No intracranial hemorrhage, mass lesions, hydrocephalus or CT evidence for an acute infarct.   2.  Mild diffuse cerebral parenchymal volume loss. Presumed chronic hypertensive/microvascular ischemic white matter changes.         CT Chest Pulmonary Embolism w Contrast   Final Result   IMPRESSION:   1.  No pulmonary embolism or acute traumatic findings in the chest.   2.  Findings suggesting airway infection/inflammation as described, which can be seen in the setting of mycobacterium avium intracellulare.   3.  Additional chronic noncritical details in the findings.      CT Cervical Spine  w/o Contrast   Final Result   IMPRESSION:   1.  No fracture or posttraumatic subluxation.   2.  Severe spinal canal narrowing at C5-C6.   3.  Moderate to severe bilateral neuroforaminal narrowing at C5-C6.         XR Pelvis w Hip Left 1 View   Final Result   IMPRESSION: Bones are markedly demineralized. Bilateral hip implants.      There is some soft tissue mineralization along the lateral aspect of the left thigh and proximal femur. Additionally, there is some subtle cortical undulation which is concerning but not completely diagnostic for a nondisplaced left proximal femoral    periprosthetic fracture. Recommend CT for further evaluation.      There is also some focal cortical irregularity along the left inferior pubic ramus, also concerning for a nondisplaced fracture.      Focal cortical thickening along the contralateral right inferior pubic ramus is favored to reflect remodeling from a chronic fracture.      NOTE: ABNORMAL REPORT      THE DICTATION ABOVE DESCRIBES AN ABNORMALITY FOR WHICH FOLLOWUP IS NEEDED.             To be done/followed up on inpatient unit:  ortho consult     Does this patient have any cognitive concerns?:  none    Activity level - Baseline/Home:  Independent and Walker  Activity Level - Current:   Total Care    Patient's Preferred language: English   Needed?: No    Isolation: None  Infection: Not Applicable  Patient tested for COVID 19 prior to admission: NO  Bariatric?: No    Vital Signs:   Vitals:    04/15/25 1928 04/15/25 1952   BP: (!) 148/92    Pulse: 97    Resp: 20    Temp: 97.4  F (36.3  C)    TempSrc: Oral    SpO2: (!) 89% 94%       Cardiac Rhythm:     Was the PSS-3 completed:   Yes  What interventions are required if any?               Family Comments: Daughter and two sons at bedside in ED   Call mt [facility] with any updates    For the majority of the shift this patient's behavior was Green.   Behavioral interventions performed were n/a.    ED NURSE PHONE NUMBER:  *[phone]

## 2025-04-16 NOTE — PROGRESS NOTES
CLINICAL SWALLOW EVALUATION       04/16/25 6288   Appointment Info   Signing Clinician's Name / Credentials (SLP) Haydee Barrera M.A, CCC-SLP, [facility]   General Information   Onset of Illness/Injury or Date of Surgery 04/15/25   Referring Physician Dr. Zarco   Patient/Family Therapy Goal Statement (SLP) Patient would like to eat scrambled eggs, drink some water and coffee.   Pertinent History of Current Problem Patient admitted with pelvic and sacral fractures after fall at home.  Her PMH is significant for arteritis, vasculitis, dementia, and recurrent UTIs.  Her swallowing function was assessed in  2017 hospital admission and she was able to progress back to regular diet at that time.  Her daughter reports she eats a regular texture diet at home.   Type of Evaluation   Type of Evaluation Swallow Evaluation   Oral Motor   Oral Musculature generally intact   Structural Abnormalities none present   Mucosal Quality dry   Dentition (Oral Motor)   Dentition (Oral Motor) adequate dentition   Tongue Function (Oral Motor)   Tongue ROM (Oral Motor) WNL   Vocal Quality/Secretion Management (Oral Motor)   Vocal Quality (Oral Motor) WNL   General Swallowing Observations   Past History of Dysphagia See above   Comment, Secretions/Suctioning 2L   Respiratory Support nasal cannula   Current Diet/Method of Nutritional Intake (General Swallowing Observations, NIS) thin liquids (level 0);regular diet   Swallowing Evaluation Clinical swallow evaluation   Clinical Swallow Evaluation   Feeding Assistance minimal assistance required   Clinical Swallow Evaluation Textures Trialed thin liquids;pureed;solid foods   Clinical Swallow Eval: Thin Liquid Texture Trial   Mode of Presentation, Thin Liquids cup;spoon;self-fed;straw;fed by clinician   Volume of Liquid or Food Presented 8 oz   Oral Phase of Swallow WFL   Pharyngeal Phase of Swallow intact   Diagnostic Statement Suspect premature pharyngeal entry x1, minimal throat clear self  feeding by cup.  No overt Sx of aspiration with liquids by straw   Clinical Swallow Evaluation: Puree Solid Texture Trial   Mode of Presentation, Puree spoon;fed by clinician   Volume of Puree Presented 2 oz   Oral Phase, Puree WFL   Pharyngeal Phase, Puree intact   Diagnostic Statement Minimal bolus holding, good clearance   Clinical Swallow Evaluation: Solid Food Texture Trial   Mode of Presentation self-fed   Volume Presented 1 cracker   Oral Phase residue in oral cavity;effortful AP movement;delayed AP movement   Oral Residue mid posterior tongue   Pharyngeal Phase intact   Diagnostic Statement Cleared residue with extra swallows and liquid wash   Swallowing Recommendations   Diet Consistency Recommendations thin liquids (level 0);easy to chew (level 7)   Supervision Level for Intake close supervision needed   Mode of Delivery Recommendations bolus size, small;slow rate of intake;food moistened   Swallowing Maneuver Recommendations alternate food and liquid intake   Monitoring/Assistance Required (Eating/Swallowing) stop eating activities when fatigue is present   Recommended Feeding/Eating Techniques (Swallow Eval) maintain upright sitting position for eating;maintain upright posture during/after eating for 30 minutes;set-up and prepare tray   Medication Administration Recommendations, Swallowing (SLP) As preferred per patient, trial whole pills with puree carrier if concerns demonstrated when using thin liquid wash   Clinical Impression   Criteria for Skilled Therapeutic Interventions Met (SLP Eval) Yes, treatment indicated   SLP Diagnosis Mild oral dysphagia   Risks & Benefits of therapy have been explained evaluation/treatment results reviewed;patient;daughter   SLP Total Evaluation Time   Eval: oral/pharyngeal swallow function, clinical swallow Minutes (52684) 25   SLP Goals   Therapy Frequency (SLP Eval) 2 times/week   SLP Predicted Duration/Target Date for Goal Attainment 04/23/25   SLP Goals Swallow    SLP: Safely tolerate diet without signs/symptoms of aspiration Easy to chew diet;Thin liquids;With use of swallow precautions;With assistance/supervision   Interventions   Interventions Quick Adds Swallowing Dysfunction   Swallowing Intervention   Treatment of Swallowing Dysfunction &/or Oral Function for Feeding Minutes (01976) 10   Symptoms Noted During/After Treatment Fatigue   Treatment Detail/Skilled Intervention Trained patient and daughter re: generalized safe swallowing strategies, dry mouth compensations for safe intake.  Patient implemented strategy with cueing.   SLP Discharge Planning   SLP Plan Meal follow up   SLP Discharge Recommendation extended care facility   SLP Rationale for DC Rec Expect patient will meet swallowing goals by discharge   SLP Brief overview of current status  Clinical swallow evaluation and treatment: recommend easy to chew diet with thin liquids when alert, positioned upright with assistance, ok for straw sips when alert.   SLP Time and Intention   Total Session Time (sum of timed and untimed services) 35

## 2025-04-16 NOTE — PROGRESS NOTES
RECEIVING UNIT ED HANDOFF REVIEW    ED Nurse Handoff Report was reviewed by: Sharri Alvarado RN on April 16, 2025 at 12:39 AM

## 2025-04-16 NOTE — ED NOTES
Bed: ED06  Expected date:   Expected time:   Means of arrival:   Comments:  [age]F hip pain after a fall, unable to walk or bear weight

## 2025-04-16 NOTE — PROGRESS NOTES
Pt is Alert to self, forgetful. VSS on 4L of O2, satting at 91-94% on oxy mask. Pain upon movement, otherwise seemed comfortable whrn not moving. Able to sleep in between care. On purewick with minimal UOP, pt is incontinent. Noted skin issues ( see LDA), skin assessment was done with Other RN.  On TELE. Can bel to move BUE, with good strength, 5/5 but some weakness on BLE. On speech consult for dysphagia. Almost at the end of the shift, pt is trying to remove the oxymask, redirected by the RN

## 2025-04-16 NOTE — ED NOTES
Patient incontinent at baseline. Patient brief changed, purwick placed. Unable to collect urine sample at this time.

## 2025-04-16 NOTE — ED PROVIDER NOTES
Emergency Department Note      History of Present Illness   Chief Complaint   Fall    HPI   Rayne Mabry is a 89 year old female with a past medical history significant for Bronchiectasis, CHF and COPD here for evaluation after a fall. EMS states she had unwitnessed fall prior to arrival, states she tripped. No loss of consciousness, no head strike. No lightheadedness or dizziness prior to fall. She was on ground for 30 minutes before staff found her. She was laying on her left hip, unable to bear weight on left leg. EMS got her up. She was complaining of left upper hip pain. EMS gave two 50 mcg fentanyl for pain. She also has an abrasion to the right lower extremity.  Patient is having difficulty describing her pain but appears uncomfortable.  Patient's oxygen was 85 on RA upon arrival scene, put on 4 L NC which increased oxygen to 95. However en route her oxygen decreased to 76, put her on 6 L which increased up to 97 for several minutes and now oxygen is back in the 80s. Patient is not on oxygen at baseline. Lung were clear. No vomiting. Patient has history of bilateral hip replacement. No chest pain. No neck pain. Patient lives in assisted living facility. She is pretty independent and walks with a walker.     Independent Historian   EMS as detailed above.    Review of External Notes   None    Past Medical History     Medical History and Problem List   CHF  Anxiety  Arthritis of knee   Chronic anemia  Closed left hip fracture   Depression  GERD   Mild cognitive disorder  Polyarteritis nodosa  Polyneuropathy  UTI  Pneumonia   Hypothyroidism   Cystitis   BCC of skin  Compression fracture of thoracic vertebra  COPD   RA  Bronchiectasis    Medications   Levothyroxine     Surgical History   Hysterectomy   Hip fracture surgery  Partial hip arthroplasty   BCC skin removal     Physical Exam     Patient Vitals for the past 24 hrs:   BP Temp Temp src Pulse Resp SpO2   04/15/25 1952 -- -- -- -- -- 94 %   04/15/25 1928  (!) 148/92 97.4  F (36.3  C) Oral 97 20 (!) 89 %     Physical Exam  General: Alert, appears elderly and frail. Cooperative.     In moderate distress  HEENT:  Head:  Atraumatic  Ears:  External ears are normal  Mouth/Throat:  Oropharynx is without erythema or exudate and mucous membranes are moist   Eyes:   Conjunctivae normal and EOM are normal. No scleral icterus.  CV:  Normal rate, regular rhythm, normal heart sounds and radial pulses are 2+ and symmetric.   Resp:  Breath sounds are coarse bilaterally, no wheezing.      Non-labored, no retractions or accessory muscle use.  On 4LPM O2 by NC.    GI:  Abdomen is soft, no distension, no tenderness. No rebound or guarding.  No CVA tenderness bilaterally  MS:  No edema.    Back atraumatic.    Left Hip:    There is decreased ROM of the left hip    Flexion and Extension of the hip is abnormal    There is no evidence of clinical dislocation    There is no hematoma or obvious bursitis    The femur is tender in the left groin     Sensory and Motor exam to the thigh and distal leg are normal    The knee, shin, ankle, and foot are without pain    Normal distal pulses are detected  Skin:  Warm and dry.  No rash or lesions noted.  Neuro:   Alert. Globally weak.  GCS: 15  Psych: Normal mood and affect.    Diagnostics     Lab Results   Labs Ordered and Resulted from Time of ED Arrival to Time of ED Departure   COMPREHENSIVE METABOLIC PANEL - Abnormal       Result Value    Sodium 138      Potassium 4.3      Carbon Dioxide (CO2) 26      Anion Gap 11      Urea Nitrogen 15.7      Creatinine 0.62      GFR Estimate 82      Calcium 9.8      Chloride 101      Glucose 109 (*)     Alkaline Phosphatase 69      AST 29      ALT 31      Protein Total 7.0      Albumin 3.7      Bilirubin Total 0.5     TROPONIN T, HIGH SENSITIVITY - Abnormal    Troponin T, High Sensitivity 21 (*)    CBC WITH PLATELETS AND DIFFERENTIAL - Abnormal    WBC Count 11.9 (*)     RBC Count 4.38      Hemoglobin 13.7       Hematocrit 41.9      MCV 96      MCH 31.3      MCHC 32.7      RDW 12.9      Platelet Count 233      % Neutrophils 91      % Lymphocytes 4      % Monocytes 3      % Eosinophils 1      % Basophils 0      % Immature Granulocytes 2      NRBCs per 100 WBC 0      Absolute Neutrophils 10.8 (*)     Absolute Lymphocytes 0.5 (*)     Absolute Monocytes 0.3      Absolute Eosinophils 0.1      Absolute Basophils 0.0      Absolute Immature Granulocytes 0.2      Absolute NRBCs 0.0     ISTAT GASES LACTATE VENOUS POCT - Abnormal    Lactic Acid POCT 0.6 (*)     Bicarbonate Venous POCT 29 (*)     O2 Sat, Venous POCT 7 (*)     pCO2 Venous POCT 51 (*)     pH Venous POCT 7.37      pO2 Venous POCT 9 (*)     Base Excess/Deficit (+/-) POCT 3.0     NT PROBNP INPATIENT - Normal    N terminal Pro BNP Inpatient 218     ISTAT CREATININE POCT - Normal    Creatinine POCT 0.6      GFR, ESTIMATED POCT >60     ROUTINE UA WITH MICROSCOPIC REFLEX TO CULTURE   TROPONIN T, HIGH SENSITIVITY   PROCALCITONIN   ISTAT CREATININE POCT   BLOOD CULTURE   BLOOD CULTURE     Imaging   CT Hip Left w/o Contrast   Final Result    IMPRESSION:   1.  Left hip implant. No evidence of an acute displaced periprosthetic fracture involving the proximal femur. Previously seen irregularity on radiograph was likely summation artifact. Note is made that the distal tip of the stem is excluded from the    field-of-view.      2.  Acute nondisplaced to minimally displaced left pubic rami fractures.      3.  Probable nondisplaced left sacral alar fracture.      4.  Bones are markedly demineralized.            NOTE: ABNORMAL REPORT      THE DICTATION ABOVE DESCRIBES AN ABNORMALITY FOR WHICH FOLLOW-UP IS NEEDED.                CT Head w/o Contrast   Final Result   IMPRESSION:   1.  No intracranial hemorrhage, mass lesions, hydrocephalus or CT evidence for an acute infarct.   2.  Mild diffuse cerebral parenchymal volume loss. Presumed chronic hypertensive/microvascular ischemic white  matter changes.         CT Chest Pulmonary Embolism w Contrast   Final Result   IMPRESSION:   1.  No pulmonary embolism or acute traumatic findings in the chest.   2.  Findings suggesting airway infection/inflammation as described, which can be seen in the setting of mycobacterium avium intracellulare.   3.  Additional chronic noncritical details in the findings.      CT Cervical Spine w/o Contrast   Final Result   IMPRESSION:   1.  No fracture or posttraumatic subluxation.   2.  Severe spinal canal narrowing at C5-C6.   3.  Moderate to severe bilateral neuroforaminal narrowing at C5-C6.         XR Pelvis w Hip Left 1 View   Final Result   IMPRESSION: Bones are markedly demineralized. Bilateral hip implants.      There is some soft tissue mineralization along the lateral aspect of the left thigh and proximal femur. Additionally, there is some subtle cortical undulation which is concerning but not completely diagnostic for a nondisplaced left proximal femoral    periprosthetic fracture. Recommend CT for further evaluation.      There is also some focal cortical irregularity along the left inferior pubic ramus, also concerning for a nondisplaced fracture.      Focal cortical thickening along the contralateral right inferior pubic ramus is favored to reflect remodeling from a chronic fracture.      NOTE: ABNORMAL REPORT      THE DICTATION ABOVE DESCRIBES AN ABNORMALITY FOR WHICH FOLLOWUP IS NEEDED.           EKG   ECG results from 04/15/25   EKG 12 lead     Value    Systolic Blood Pressure     Diastolic Blood Pressure     Ventricular Rate 95    Atrial Rate 95    NJ Interval 220    QRS Duration 76        QTc 457    P Axis 58    R AXIS -39    T Axis 28    Interpretation ECG      Sinus rhythm with 1st degree A-V block with Premature ventricular complexes or Fusion complexes  Possible Left atrial enlargement  Left axis deviation  Inferior infarct , age undetermined  Abnormal ECG  When compared with ECG of 30-Jun-2019  14:28, No significant changes          Independent Interpretation   CT Head: No intracranial hemorrhage.    ED Course      Medications Administered   Medications   sodium chloride (PF) 0.9% PF flush 3 mL (has no administration in time range)   sodium chloride (PF) 0.9% PF flush 3 mL (3 mLs Intracatheter Not Given 4/15/25 2127)   fentaNYL (PF) (SUBLIMAZE) injection 50 mcg (50 mcg Intravenous $Given 4/15/25 2126)   cefTRIAXone (ROCEPHIN) 2 g vial to attach to  ml bag for ADULTS or NS 50 ml bag for PEDS (has no administration in time range)   azithromycin (ZITHROMAX) 500 mg vial to attach to  mL bag (has no administration in time range)   sodium chloride 0.9% BOLUS 1,000 mL (1,000 mLs Intravenous $New Bag 4/15/25 2125)   iopamidol (ISOVUE-370) solution 53 mL (53 mLs Intravenous $Given 4/15/25 2036)   sodium chloride 0.9 % bag for CT scan flush (81 mLs Intravenous $Given 4/15/25 2036)       Procedures   Procedures     Discussion of Management   Admitting HospitalistRyanne    ED Course   ED Course as of 04/15/25 2157   Tue Apr 15, 2025   1931 I obtained history and examined the patient as noted above.    2112 I spoke with Dr. Pearl of the hospitalist service who agreed to admission.        Additional Documentation  None    Medical Decision Making / Diagnosis     CMS Diagnoses: IV Antibiotics given and/or elevated Lactate of 0.6 and no sepsis note found - Delete this reminder and enter the sepsis note or '.edcms' before signing chart.>>>The patient has signs of sepsis   Sepsis ED evaluation   The patient has signs of sepsis as evidenced by:  1. Presence of 2 SIRS criteria, suspected infection, AND  2. Organ dysfunction: Sepsis work up in progress. Will continue to monitor for signs of organ dysfunction    Sepsis Care Initiation: Starting at  2112 PM on 04/15/25, until specified. Prior to this documentation, sepsis, severe sepsis, or septic shock was NOT thought to be a significant cause of illness. This order  represents the first time infection was seriously considered to be affecting the patient.    Lactic Acid Results:  Recent Labs   Lab Test 04/15/25  1945 08/30/19  0659   LACT 0.6* 0.9       3 Hour Bundle 6 Hour Bundle (Reassessment)   Blood Cultures before IV Antibiotics: Yes  Antibiotics given: see below  Prehospital fluid volume (mL):                     Total fluids given (ED +Pre-hospital):  The patient responded to a lesser volume of IV fluids. The initial volume ordered was 1000 mL.    Repeat Lactic Acid Level: Ordered by reflex for 2 hours after initial lactic acid collection.  Vasopressors: MAP>65 after initial IVF bolus, will continue to monitor fluid status and vital signs.  Repeat perfusion exam: I attest to having performed a repeat sepsis exam and assessment of perfusion at 9:52 PM .   BMI Readings from Last 1 Encounters:   09/17/19 17.64 kg/m        Anti-infectives (From admission through now)      Start     Dose/Rate Route Frequency Ordered Stop    04/15/25 2150  cefTRIAXone (ROCEPHIN) 2 g vial to attach to  ml bag for ADULTS or NS 50 ml bag for PEDS         2 g  over 30 Minutes Intravenous ONCE 04/15/25 2147      04/15/25 2150  azithromycin (ZITHROMAX) 500 mg vial to attach to  mL bag         500 mg  over 1 Hours Intravenous ONCE 04/15/25 2147                  MIPS    CT for PE was ordered because the patient is high risk for pulmonary embolism.    ANTONINO Mabry is a 89 year old female who presents after an unwitnessed fall at home.  Patient having exquisite left-sided hip pain.  Initial left x-ray of the hip shows no acute fracture but some demineralization around the left thigh and proximal femur.  CT was ultimately recommended of the left hip.  CT ultimately showed a left hip implant with no evidence of an acute displaced periprosthetic fracture.  There was an acute nondisplaced to minimally displaced left pubic rami fractures and a probable nondisplaced left sacral alar  fracture.  These are likely the etiology of the patient's left-sided hip pain.  Patient was hypoxic per EMS on arrival.  Challenging to get accurate waveform while here in the emergency department given tremulousness on examination.  She remains on 4 L/min O2 by nasal cannula at time of workup.  CT imaging of the head, cervical spine and chest for evaluation of sinister intrathoracic etiologies was obtained as well.  CT imaging of the head and cervical spine showed no acute traumatic injuries and no signs of acute intracranial hemorrhage.  CT imaging of the chest reveals no signs of pulmonary embolism or other traumatic findings within the chest.  There were findings suggestive of airway infection or inflammation.  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  Patient had a nonspecific leukocytosis of 11.9.  No reported fevers but there was clinical history of worsening cough superimposed on chronic cough.  In discussion with admitting service we will plan to initiate Rocephin and azithromycin for community-acquired pneumonia and plan for infectious disease consultation upon admission tomorrow.  Thankfully patient is afebrile and otherwise vitally stable except for needs of supplemental oxygen.  On final recheck prior to admission patient appears more comfortable after pain medications and a appropriate waveform has been able to be obtained.  Patient does appear to be approximately 96% on room air on 4 L/min O2 by nasal cannula.  Will attempt to wean down supplemental oxygen as able.  Given multiple pubic rami fractures as well as concerns for possible commune acquired pneumonia we will plan for admission under the care of the hospitalist service.  I spoke with Dr. Pearl who agreed to admission.    Disposition   The patient was admitted to the hospital.     Diagnosis     ICD-10-CM    1. Unwitnessed fall  R29.6       2. Acute respiratory failure with hypoxia (H)  J96.01       3. Pubic  ramus fracture, left, closed, initial encounter (H)  S32.592A       4. Closed fracture of sacrum, unspecified portion of sacrum, initial encounter (H)  S32.10XA       5. Pneumonia of both lower lobes due to infectious organism  J18.9            Discharge Medications   New Prescriptions    No medications on file         Scribe Disclosure:  I, Carli Johnson, am serving as a scribe at 7:45 PM on 4/15/2025 to document services personally performed by Carter Garcia MD based on my observations and the provider's statements to me.        Carter Garcia MD  04/15/25 [id number]

## 2025-04-16 NOTE — PHARMACY-ADMISSION MEDICATION HISTORY
Pharmacist Admission Medication History    Admission medication history is complete. The information provided in this note is only as accurate as the sources available at the time of the update.    Information Source(s): Facility (U/NH/) medication list/MAR and CareEverywhere/SureScripts via in-person    Pertinent Information: med hx completed using med list from [facility].     Changes made to PTA medication list:  Added: fluticasone/salmeterol, gabapentin, alendronate, ipratropium, vit d  Deleted: albuterol, aspirin, ergocalciferol, omeprazole  Changed: apap, azathioprine, calcium, levothyroxine, senna, setraline    Allergies reviewed with patient and updates made in EHR: no    Medication History Completed By: Tashia Ibarra RPH 4/15/2025 9:28 PM    PTA Med List   Medication Sig Last Dose/Taking    acetaminophen (TYLENOL) 325 MG tablet Take 650 mg by mouth 2 times daily. 4/15/2025 Morning    alendronate (FOSAMAX) 70 MG tablet Take 70 mg by mouth every 7 days. Tuesdays 4/15/2025 Morning    azaTHIOprine (IMURAN) 50 MG tablet Take 50 mg by mouth daily. 4/15/2025 Morning    bismuth subsalicylate (PEPTO-BISMOL) 262 MG chewable tablet Take 2 tablets by mouth every hour as needed (max 8 tablets/24 hours) Taking As Needed    calcium carbonate 500 mg, elemental, (OSCAL;OYSTER SHELL CALCIUM) 500 MG tablet Take 500 mg by mouth daily. On tuesdays 4/15/2025 Morning    fluticasone (FLONASE) 50 MCG/ACT nasal spray Spray 2 sprays into both nostrils daily 4/15/2025 Morning    fluticasone-salmeterol (ADVAIR) 500-50 MCG/ACT inhaler Inhale 1 puff into the lungs 2 times daily. 4/15/2025 Morning    gabapentin (NEURONTIN) 100 MG capsule Take 100 mg by mouth every evening. 4/14/2025    ipratropium (ATROVENT) 0.06 % nasal spray Spray 2 sprays into both nostrils 2 times daily. 4/15/2025 Morning    levothyroxine (SYNTHROID/LEVOTHROID) 75 MCG tablet Take 75 mcg by mouth daily. 4/15/2025 Morning    melatonin 3 MG tablet  Take 3 mg by mouth at bedtime. 4/14/2025    senna (SENOKOT) 8.6 MG tablet Take 1 tablet by mouth daily. Taking    sertraline (ZOLOFT) 25 MG tablet Take 25 mg by mouth at bedtime. 4/15/2025 Morning    Vitamin D3 (CHOLECALCIFEROL) 125 MCG (5000 UT) tablet Take 1 tablet by mouth daily. 4/15/2025 Morning

## 2025-04-16 NOTE — PROGRESS NOTES
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    Suspect mechanical fall at care facility.    Acute nondisplaced to minimally displaced left pubic rami fractures.   Probable nondisplaced left sacral alar fracture.   Severe spinal canal [id number].  History of osteoporosis.  Physical deconditioning from medical illness, senile frailty  Patient resident of assisted living facility, independent and ambulates with walker.  -Per family report, patient received a phone call and was walking fast from a room to a different room to answer the call, might have tripped on the cord and fell.  Patient complained of left upper hip pain.  --CT head no intracranial hemorrhage, mass lesions, hydrocephalus or CT evidence for an acute infarct.    --CT C-spine no fracture or posttraumatic subluxation.  Severe spinal canal narrowing at C5-C6. Moderate to severe bilateral neuroforaminal narrowing at C5-C6.  -X-ray of the pelvis showed there is soft tissue mineralization along the lateral aspect of the left thigh and proximal femur.    --CT left hip without contrast  No evidence of any acute or displaced periprosthetic fracture involving the proximal femur.  Previously seen irregularity on x-ray was likely summation artifact.  Acute nondisplaced due to minimally displaced left-sided pubic rami fractures seen.  Probable nondisplaced left sacral rober fracture.  Bones are markedly demineralized.  --Patient lying in bed, denies any pain.  Sitter by bedside.  -  Orthopedic surgery following, appreciate comanagement.  Weightbearing as tolerated right lower extremity.  Scheduled Tylenol 3 times daily.  Gabapentin 100 mg twice daily ordered.  Lidocaine patch topical application.  As needed Atarax every 6 hours as needed for adjuvant pain.  As needed Robaxin as needed for muscle spasms  Trial of oxycodone low-dose 2.5 mg every 6 hours as needed for moderate to severe pain.  Minimize narcotic use as able to.  Received intravenous Dilaudid for excruciating pain not resolving with oral pain meds  Continue PTA vitamin D supplements.   Hold PTA alendronate.  Health maintenance including bone health maintenance as outpatient  Rehab team evaluation.  Fall precautions    Acute encephalopathy likely multifactorial from pain/hospitalization/narcotic use.  Baseline dementia.  History of anxiety, depression.  At baseline dementia, forgetful.  Per nursing report patient impulsive, attempting to pull oxy mask.  As needed sitter by bedside.  Delirium precautions.  Minimize narcotic use as able to.  Continue PTA Zoloft.    Acute hypoxic respiratory failure likely from community-acquired pneumonia, underlying chronic lung infection/bronchiectasis.  Possible pneumonia community-acquired versus aspiration.  History of Mycobacterium AviuM complex infection in 1996 treated with no recurrence  History of bronchiectasis  Leukocytosis  Mild elevated troponin likely from demand ischemia from fall.  Per family report has had low oxygen levels in the past, not on oxygen at baseline.  In ED at the time of evaluation afebrile, O2 sats 89% on 4 L, with 6 L improved to 94%.  WBC 11.9, lactic acid 0.6, procalcitonin 0.10.  pH 7.37, pCO2 51.  Troponin high-sensitivity 21 >21, N-terminal proBNP 218.  CT chest showed no PE or acute traumatic findings in the chest. Findings suggestive of airway infection/inflammation as described, which can be seen in the setting of Mycobacterium AVM intracellular. Scattered  mucous plugging and airway centric nodular opacities and tree-in-bud. No lobar consolidation. Unchanged biapical scarring. No pleural effusion or pneumothorax.   -- Continues to require supplemental nasal oxygen, 4 L this morning  Continue intravenous ceftriaxone, azithromycin.  [4/15]. Blood cultures pending.  Telemetry monitoring.  Follow clinical improvement and accordingly de-escalate antibiotics.  Wean off supplemental nasal oxygen.  Minimize narcotic use.  I-S, Acapella use.  Subcutaneous Lovenox for DVT prophylaxis.  Trend WBC count, fever curve.  Speech therapy evaluation.  Aspiration precautions.    History of polyarteritis nodosa biopsy-proven;  History of neuropathy secondary to above;  Continue Imuran and gabapentin    Goals of care.  Initiated goals of care discussion with patient's daughter on 4/16, restorative care and would like  trial of rehabilitation at this time.     Clinically Significant Risk Factors Present on Admission        Orders Placed This Encounter      Combination Diet Regular Diet Adult      DVT Prophylaxis: SCDs, subcutaneous Lovenox  Code Status: No CPR- Do NOT Intubate  Disposition: Expected discharge in 2 days pending clinical improvement, stable discharge planning please    Medically Ready for Discharge: Anticipated in 2-4 Days     Discussed with patient, bedside RN, orthopedic surgery ROMI, care team.  Updated patient's daughter by the bedside 4/16.  Time greater than 51 minutes. More than 70% of time spent in direct patient care, care coordination, patient counseling, and formalizing plan of care.        Tone Zarco MD        Interval History:        Patient lying in bed.  Unable to answer simple questions, daughter by bedside.   Having sitter by bedside, impulsive, per report pulling nasal cannula/IV.  This morning on 4 L nasal oxygen.  Unable to obtain review of systems.  Does not appear to be in pain  Has not ambulated out of bed yet.         Physical Exam:         Physical Exam   Temp:  [97.4  F (36.3  C)-98.5  F (36.9  C)] 98  F (36.7  C)  Pulse:  [] 108  Resp:  [20-26] 20  BP: (108-148)/(70-92) 108/80  SpO2:  [75 %-94 %] 92 %    Intake/Output Summary (Last 24 hours) at 4/16/2025 1015  Last data filed at 4/16/2025 0522  Gross per 24 hour   Intake --   Output 220 ml   Net -220 ml     PHYSICAL EXAM  GENERAL: Patient is in no distress.  Unable to answer simple questions  HEENT: Oropharynx pink, moist.   HEART: Regular rate and rhythm. S1S2.   LUNGS: Slightly decreased breath sounds, no wheezing or crackles  Respirations unlabored  ABDOMEN: Soft, no abdominal tenderness, bowel sounds heard   NEURO: Moving all extremities  EXTREMITIES: No pedal edema.  SKIN: Warm, dry. + bruising.  PSYCHIATRY Cooperative       Medications:        Current Facility-Administered Medications   Medication Dose Route Frequency Provider Last Rate Last Admin    acetaminophen (TYLENOL) tablet 975 mg  975 mg Oral Q8H Sharona Pearl MD        azaTHIOprine (IMURAN) tablet 50 mg  50 mg Oral Daily Sharona Pearl MD        azithromycin (ZITHROMAX) tablet 250 mg  250 mg Oral Daily Sharona Pearl MD        calcium carbonate 500 mg (elemental) (OSCAL) tablet 500 mg  500 mg Oral Daily Sharona Pearl MD        cefTRIAXone (ROCEPHIN) 2 g vial to attach to  ml bag for ADULTS or NS 50 ml bag for PEDS  2 g Intravenous Q24H Sharona Pearl MD        fluticasone (FLONASE) 50 MCG/ACT spray 2 spray  2 spray Both Nostrils Daily Sharona Pearl MD        fluticasone-vilanterol (BREO ELLIPTA) 200-25 MCG/ACT inhaler 1 puff  1 puff Inhalation Daily Sharona Pearl MD        gabapentin (NEURONTIN) capsule 100 mg  100 mg Oral QPM Sharona Pearl MD        ipratropium (ATROVENT) 0.06 % spray 2 spray  2 spray Both Nostrils BID Sharona Pearl MD        levothyroxine (SYNTHROID/LEVOTHROID) tablet 75 mcg  75 mcg Oral Daily Sharona Pearl MD        methocarbamol (ROBAXIN) tablet 500 mg  500 mg Oral 4x Daily Sharona Pearl MD         sennosides (SENOKOT) tablet 1 tablet  1 tablet Oral Daily Sharona Pearl MD        sertraline (ZOLOFT) tablet 25 mg  25 mg Oral At Bedtime Sharona Pearl MD        sodium chloride (PF) 0.9% PF flush 3 mL  3 mL Intracatheter Q8H SUKI Sharona Pearl MD        Vitamin D3 (CHOLECALCIFEROL) tablet 125 mcg  125 mcg Oral Daily Sharona Pearl MD         Current Facility-Administered Medications   Medication Dose Route Frequency Provider Last Rate Last Admin    acetaminophen (TYLENOL) tablet 650 mg  650 mg Oral Q4H PRN Sharona Pearl MD        Or    acetaminophen (TYLENOL) Suppository 650 mg  650 mg Rectal Q4H PRN Sharona Pearl MD        bisacodyl (DULCOLAX) suppository 10 mg  10 mg Rectal Daily PRN Sharona Pearl MD        bismuth subsalicylate (PEPTO BISMOL) chewable tablet 524 mg  2 tablet Oral Q1H PRN Sharona Pearl MD        calcium carbonate (TUMS) chewable tablet 1,000 mg  1,000 mg Oral 4x Daily PRN Sharona Pearl MD        HYDROmorphone (DILAUDID) injection 0.2 mg  0.2 mg Intravenous Q3H PRN Sharona Pearl MD   0.2 mg at 04/16/25 0836    lidocaine (LMX4) cream   Topical Q1H PRN Sharona Pearl MD        lidocaine 1 % 0.1-1 mL  0.1-1 mL Other Q1H PRN Sharona Pearl MD        naloxone (NARCAN) injection 0.2 mg  0.2 mg Intravenous Q2 Min PRN Sharona Pearl MD        Or    naloxone (NARCAN) injection 0.4 mg  0.4 mg Intravenous Q2 Min PRN Sharona Pearl MD        Or    naloxone (NARCAN) injection 0.2 mg  0.2 mg Intramuscular Q2 Min PRN Sharona Pearl MD        Or    naloxone (NARCAN) injection 0.4 mg  0.4 mg Intramuscular Q2 Min PRN Sharona Pearl MD        ondansetron (ZOFRAN ODT) ODT tab 4 mg  4 mg Oral Q6H PRN Sharona Pearl MD        Or    ondansetron (ZOFRAN) injection 4 mg  4 mg Intravenous Q6H PRN Sharona Pearl MD        oxyCODONE IR (ROXICODONE) half-tab 2.5 mg  2.5 mg Oral Q4H PRN Sharona Pearl MD        polyethylene glycol (MIRALAX) Packet 17 g  17 g Oral BID PRN Sharona Pearl MD        prochlorperazine  (COMPAZINE) injection 5 mg  5 mg Intravenous Q6H PRN Sharona Pearl MD        Or    prochlorperazine (COMPAZINE) tablet 5 mg  5 mg Oral Q6H PRN Sharona Pearl MD        senlori-docusate (SENOKOT-S/PERICOLACE) 8.6-50 MG per tablet 1 tablet  1 tablet Oral BID PRN Sharona Pearl MD        Or    senna-docusate (SENOKOT-S/PERICOLACE) 8.6-50 MG per tablet 2 tablet  2 tablet Oral BID PRN Sharona Pearl MD        sodium chloride (PF) 0.9% PF flush 3 mL  3 mL Intracatheter q1 min prSharona Sofia MD        traMADol (ULTRAM) tablet 50 mg  50 mg Oral Q6H PRSharona Sofia MD                Data:      All new lab and imaging data was reviewed.

## 2025-04-16 NOTE — ED NOTES
Patient repeatedly pulling at nasal cannula and IV. Reorientation techniques not effective. Patient believes that RN is her daughter and that the patient is at home. Patient desat to 75% spo2 on room air when oxygen was pulled off. Patient refusing to leave nasal cannula on. Patients daughter previously mentioned that patient gets confused at night and has a history of delirium with pain medication. Obtained a verbal order for soft restraints from ED MD.

## 2025-04-17 ENCOUNTER — APPOINTMENT (OUTPATIENT)
Dept: GENERAL RADIOLOGY | Facility: CLINIC | Age: OVER 89
DRG: 535 | End: 2025-04-17
Payer: COMMERCIAL

## 2025-04-17 ENCOUNTER — APPOINTMENT (OUTPATIENT)
Dept: PHYSICAL THERAPY | Facility: CLINIC | Age: OVER 89
DRG: 535 | End: 2025-04-17
Payer: COMMERCIAL

## 2025-04-17 LAB
ANION GAP SERPL CALCULATED.3IONS-SCNC: 12 MMOL/L (ref 7–15)
ANION GAP SERPL CALCULATED.3IONS-SCNC: 14 MMOL/L (ref 7–15)
ATRIAL RATE - MUSE: 150 BPM
BACTERIA BLD CULT: NORMAL
BACTERIA BLD CULT: NORMAL
BUN SERPL-MCNC: 19.3 MG/DL (ref 8–23)
BUN SERPL-MCNC: 20.2 MG/DL (ref 8–23)
CALCIUM SERPL-MCNC: 8.9 MG/DL (ref 8.8–10.4)
CALCIUM SERPL-MCNC: 9.2 MG/DL (ref 8.8–10.4)
CHLORIDE SERPL-SCNC: 103 MMOL/L (ref 98–107)
CHLORIDE SERPL-SCNC: 99 MMOL/L (ref 98–107)
CREAT SERPL-MCNC: 0.62 MG/DL (ref 0.51–0.95)
CREAT SERPL-MCNC: 0.69 MG/DL (ref 0.51–0.95)
DIASTOLIC BLOOD PRESSURE - MUSE: NORMAL MMHG
EGFRCR SERPLBLD CKD-EPI 2021: 80 ML/MIN/1.73M2
EGFRCR SERPLBLD CKD-EPI 2021: 82 ML/MIN/1.73M2
ERYTHROCYTE [DISTWIDTH] IN BLOOD BY AUTOMATED COUNT: 13 % (ref 10–15)
GLUCOSE BLDC GLUCOMTR-MCNC: 129 MG/DL (ref 70–99)
GLUCOSE SERPL-MCNC: 112 MG/DL (ref 70–99)
GLUCOSE SERPL-MCNC: 87 MG/DL (ref 70–99)
HCO3 SERPL-SCNC: 22 MMOL/L (ref 22–29)
HCO3 SERPL-SCNC: 26 MMOL/L (ref 22–29)
HCT VFR BLD AUTO: 39.1 % (ref 35–47)
HGB BLD-MCNC: 12.4 G/DL (ref 11.7–15.7)
HGB BLD-MCNC: 13.1 G/DL (ref 11.7–15.7)
INTERPRETATION ECG - MUSE: NORMAL
LACTATE SERPL-SCNC: 1.3 MMOL/L (ref 0.7–2)
MAGNESIUM SERPL-MCNC: 2 MG/DL (ref 1.7–2.3)
MCH RBC QN AUTO: 31.4 PG (ref 26.5–33)
MCHC RBC AUTO-ENTMCNC: 33.5 G/DL (ref 31.5–36.5)
MCV RBC AUTO: 94 FL (ref 78–100)
P AXIS - MUSE: NORMAL DEGREES
PHOSPHATE SERPL-MCNC: 2 MG/DL (ref 2.5–4.5)
PLATELET # BLD AUTO: 214 10E3/UL (ref 150–450)
POTASSIUM SERPL-SCNC: 3.4 MMOL/L (ref 3.4–5.3)
POTASSIUM SERPL-SCNC: 3.6 MMOL/L (ref 3.4–5.3)
PR INTERVAL - MUSE: NORMAL MS
QRS DURATION - MUSE: 82 MS
QT - MUSE: 290 MS
QTC - MUSE: 489 MS
R AXIS - MUSE: -34 DEGREES
RBC # BLD AUTO: 4.17 10E6/UL (ref 3.8–5.2)
SODIUM SERPL-SCNC: 137 MMOL/L (ref 135–145)
SODIUM SERPL-SCNC: 139 MMOL/L (ref 135–145)
SYSTOLIC BLOOD PRESSURE - MUSE: NORMAL MMHG
T AXIS - MUSE: 14 DEGREES
TROPONIN T SERPL HS-MCNC: 46 NG/L
TSH SERPL DL<=0.005 MIU/L-ACNC: 3.02 UIU/ML (ref 0.3–4.2)
VENTRICULAR RATE- MUSE: 171 BPM
WBC # BLD AUTO: 13.3 10E3/UL (ref 4–11)
WBC # BLD AUTO: 14.6 10E3/UL (ref 4–11)

## 2025-04-17 PROCEDURE — 71045 X-RAY EXAM CHEST 1 VIEW: CPT

## 2025-04-17 PROCEDURE — 80048 BASIC METABOLIC PNL TOTAL CA: CPT | Performed by: HOSPITALIST

## 2025-04-17 PROCEDURE — 82310 ASSAY OF CALCIUM: CPT

## 2025-04-17 PROCEDURE — 36415 COLL VENOUS BLD VENIPUNCTURE: CPT | Performed by: HOSPITALIST

## 2025-04-17 PROCEDURE — 84100 ASSAY OF PHOSPHORUS: CPT

## 2025-04-17 PROCEDURE — 93005 ELECTROCARDIOGRAM TRACING: CPT

## 2025-04-17 PROCEDURE — 250N000009 HC RX 250

## 2025-04-17 PROCEDURE — 84484 ASSAY OF TROPONIN QUANT: CPT | Performed by: HOSPITALIST

## 2025-04-17 PROCEDURE — 210N000002 HC R&B HEART CARE

## 2025-04-17 PROCEDURE — 84443 ASSAY THYROID STIM HORMONE: CPT

## 2025-04-17 PROCEDURE — 36415 COLL VENOUS BLD VENIPUNCTURE: CPT

## 2025-04-17 PROCEDURE — 250N000013 HC RX MED GY IP 250 OP 250 PS 637: Performed by: INTERNAL MEDICINE

## 2025-04-17 PROCEDURE — 258N000003 HC RX IP 258 OP 636

## 2025-04-17 PROCEDURE — 250N000011 HC RX IP 250 OP 636: Performed by: HOSPITALIST

## 2025-04-17 PROCEDURE — 97530 THERAPEUTIC ACTIVITIES: CPT | Mod: GP

## 2025-04-17 PROCEDURE — 99292 CRITICAL CARE ADDL 30 MIN: CPT

## 2025-04-17 PROCEDURE — 99233 SBSQ HOSP IP/OBS HIGH 50: CPT | Performed by: HOSPITALIST

## 2025-04-17 PROCEDURE — 83735 ASSAY OF MAGNESIUM: CPT

## 2025-04-17 PROCEDURE — 250N000013 HC RX MED GY IP 250 OP 250 PS 637: Performed by: HOSPITALIST

## 2025-04-17 PROCEDURE — 80048 BASIC METABOLIC PNL TOTAL CA: CPT

## 2025-04-17 PROCEDURE — 85048 AUTOMATED LEUKOCYTE COUNT: CPT | Performed by: HOSPITALIST

## 2025-04-17 PROCEDURE — 250N000011 HC RX IP 250 OP 636: Performed by: INTERNAL MEDICINE

## 2025-04-17 PROCEDURE — 99291 CRITICAL CARE FIRST HOUR: CPT | Performed by: NURSE PRACTITIONER

## 2025-04-17 PROCEDURE — 85018 HEMOGLOBIN: CPT | Performed by: HOSPITALIST

## 2025-04-17 PROCEDURE — 82310 ASSAY OF CALCIUM: CPT | Performed by: HOSPITALIST

## 2025-04-17 PROCEDURE — 83605 ASSAY OF LACTIC ACID: CPT

## 2025-04-17 PROCEDURE — 250N000011 HC RX IP 250 OP 636: Performed by: NURSE PRACTITIONER

## 2025-04-17 PROCEDURE — 85014 HEMATOCRIT: CPT

## 2025-04-17 PROCEDURE — 250N000012 HC RX MED GY IP 250 OP 636 PS 637: Performed by: INTERNAL MEDICINE

## 2025-04-17 PROCEDURE — 250N000009 HC RX 250: Performed by: NURSE PRACTITIONER

## 2025-04-17 RX ORDER — DILTIAZEM HYDROCHLORIDE 5 MG/ML
10 INJECTION INTRAVENOUS ONCE
Status: COMPLETED | OUTPATIENT
Start: 2025-04-17 | End: 2025-04-17

## 2025-04-17 RX ORDER — POTASSIUM CHLORIDE 1500 MG/1
40 TABLET, EXTENDED RELEASE ORAL ONCE
Status: COMPLETED | OUTPATIENT
Start: 2025-04-17 | End: 2025-04-18

## 2025-04-17 RX ORDER — METOPROLOL TARTRATE 1 MG/ML
2.5 INJECTION, SOLUTION INTRAVENOUS ONCE
Status: COMPLETED | OUTPATIENT
Start: 2025-04-17 | End: 2025-04-17

## 2025-04-17 RX ORDER — LIDOCAINE 40 MG/G
CREAM TOPICAL
Status: DISCONTINUED | OUTPATIENT
Start: 2025-04-17 | End: 2025-04-21 | Stop reason: HOSPADM

## 2025-04-17 RX ADMIN — CEFTRIAXONE SODIUM 2 G: 2 INJECTION, POWDER, FOR SOLUTION INTRAMUSCULAR; INTRAVENOUS at 17:05

## 2025-04-17 RX ADMIN — DILTIAZEM HYDROCHLORIDE 10 MG: 5 INJECTION, SOLUTION INTRAVENOUS at 19:38

## 2025-04-17 RX ADMIN — AZITHROMYCIN DIHYDRATE 250 MG: 250 TABLET ORAL at 17:05

## 2025-04-17 RX ADMIN — OXYCODONE HYDROCHLORIDE 2.5 MG: 5 TABLET ORAL at 17:24

## 2025-04-17 RX ADMIN — SODIUM CHLORIDE, SODIUM LACTATE, POTASSIUM CHLORIDE, AND CALCIUM CHLORIDE 250 ML: .6; .31; .03; .02 INJECTION, SOLUTION INTRAVENOUS at 18:24

## 2025-04-17 RX ADMIN — ACETAMINOPHEN 975 MG: 325 TABLET, FILM COATED ORAL at 03:12

## 2025-04-17 RX ADMIN — METOPROLOL TARTRATE 2.5 MG: 5 INJECTION INTRAVENOUS at 18:25

## 2025-04-17 RX ADMIN — CALCIUM 500 MG: 500 TABLET ORAL at 08:50

## 2025-04-17 RX ADMIN — SODIUM CHLORIDE, SODIUM LACTATE, POTASSIUM CHLORIDE, AND CALCIUM CHLORIDE 250 ML: .6; .31; .03; .02 INJECTION, SOLUTION INTRAVENOUS at 18:33

## 2025-04-17 RX ADMIN — METOPROLOL TARTRATE 2.5 MG: 5 INJECTION INTRAVENOUS at 18:58

## 2025-04-17 RX ADMIN — AZATHIOPRINE 50 MG: 50 TABLET ORAL at 08:50

## 2025-04-17 RX ADMIN — GABAPENTIN 100 MG: 100 CAPSULE ORAL at 08:50

## 2025-04-17 RX ADMIN — IPRATROPIUM BROMIDE 2 SPRAY: 42 SPRAY NASAL at 08:58

## 2025-04-17 RX ADMIN — Medication 125 MCG: at 08:50

## 2025-04-17 RX ADMIN — LIDOCAINE 1 PATCH: 4 PATCH TOPICAL at 08:50

## 2025-04-17 RX ADMIN — ENOXAPARIN SODIUM 40 MG: 40 INJECTION SUBCUTANEOUS at 17:05

## 2025-04-17 RX ADMIN — FLUTICASONE FUROATE AND VILANTEROL TRIFENATATE 1 PUFF: 200; 25 POWDER RESPIRATORY (INHALATION) at 08:59

## 2025-04-17 RX ADMIN — SENNOSIDES AND DOCUSATE SODIUM 2 TABLET: 50; 8.6 TABLET ORAL at 08:50

## 2025-04-17 RX ADMIN — FLUTICASONE PROPIONATE 2 SPRAY: 50 SPRAY, METERED NASAL at 08:58

## 2025-04-17 RX ADMIN — LEVOTHYROXINE SODIUM 75 MCG: 75 TABLET ORAL at 08:50

## 2025-04-17 RX ADMIN — ACETAMINOPHEN 975 MG: 325 TABLET, FILM COATED ORAL at 13:50

## 2025-04-17 ASSESSMENT — ACTIVITIES OF DAILY LIVING (ADL)
ADLS_ACUITY_SCORE: 60
ADLS_ACUITY_SCORE: 63
ADLS_ACUITY_SCORE: 63
ADLS_ACUITY_SCORE: 60
ADLS_ACUITY_SCORE: 67
ADLS_ACUITY_SCORE: 63
ADLS_ACUITY_SCORE: 60
ADLS_ACUITY_SCORE: 51
ADLS_ACUITY_SCORE: 63
DEPENDENT_IADLS:: CLEANING;COOKING;LAUNDRY;SHOPPING;MEAL PREPARATION;MEDICATION MANAGEMENT;MONEY MANAGEMENT;TRANSPORTATION
ADLS_ACUITY_SCORE: 63
ADLS_ACUITY_SCORE: 67
ADLS_ACUITY_SCORE: 60
ADLS_ACUITY_SCORE: 51
ADLS_ACUITY_SCORE: 60
ADLS_ACUITY_SCORE: 63
ADLS_ACUITY_SCORE: 63
ADLS_ACUITY_SCORE: 67
ADLS_ACUITY_SCORE: 60
ADLS_ACUITY_SCORE: 60
ADLS_ACUITY_SCORE: 63
ADLS_ACUITY_SCORE: 67

## 2025-04-17 NOTE — CONSULTS
Care Management Initial Consult    General Information  Assessment completed with: Patient, Children, Aliya and daughter Darlene  Type of CM/SW Visit: Initial Assessment    Primary Care Provider verified and updated as needed: Yes   Readmission within the last 30 days: no previous admission in last 30 days      Reason for Consult: discharge planning  Advance Care Planning: Advance Care Planning Reviewed: present on chart          Communication Assessment  Patient's communication style: spoken language (English or Bilingual)             Cognitive  Cognitive/Neuro/Behavioral: unchanged from my previous assessment  Level of Consciousness: confused  Arousal Level: opens eyes spontaneously  Orientation: disoriented to, place, time, situation  Mood/Behavior: calm, cooperative  Best Language: 0 - No aphasia  Speech: clear    Living Environment:   People in home: facility resident     Current living Arrangements: assisted living  Name of Facility: [facility]   Able to return to prior arrangements:         Family/Social Support:  Care provided by: self, other (see comments) (staff)  Provides care for: no one, unable/limited ability to care for self  Marital Status:   Support system: Children, Facility resident(s)/Staff          Description of Support System: Supportive, Involved    Support Assessment: Adequate family and caregiver support    Current Resources:   Patient receiving home care services: No        Community Resources: None  Equipment currently used at home: grab bar, tub/shower, shower chair, walker, rolling  Supplies currently used at home:      Employment/Financial:  Employment Status: retired        Financial Concerns: none   Referral to Financial Worker: No       Does the patient's insurance plan have a 3 day qualifying hospital stay waiver?  No    Lifestyle & Psychosocial Needs:  Social Drivers of Health     Food Insecurity: No Food Insecurity (2/28/2022)    Received from [facility]    Hunger Vital Sign     Worried About Running Out of Food in the Last Year: Never true     Ran Out of Food in the Last Year: Never true   Depression: Not at risk (9/12/2024)    Received from [facility]    PHQ-2     PHQ-2 Total: 2   Housing Stability: Not on file   Tobacco Use: Low Risk  (3/24/2025)    Received from [facility]    Patient History     Smoking Tobacco Use: Never     Smokeless Tobacco Use: Never     Passive Exposure: Not on file   Financial Resource Strain: Low Risk  (2/28/2022)    Received from [facility]    Overall Financial Resource Strain (CARDIA)     Difficulty of Paying Living Expenses: Not hard at all   Alcohol Use: Not on file   Transportation Needs: No Transportation Needs (2/28/2022)    Received from [facility]    PRAPARE - Transportation     Lack of Transportation (Medical): No     Lack of Transportation (Non-Medical): No   Physical Activity: Not on file   Interpersonal Safety: Not on file   Stress: No Stress Concern Present (4/4/2023)    Received from [facility]    [employer] [place] [employer] - Occupational Stress Questionnaire     Feeling of Stress : Not at all   Social Connections: Not on file   Health Literacy: Not on file       Functional Status:  Prior to admission patient needed assistance:   Dependent ADLs:: Ambulation-walker  Dependent IADLs:: Cleaning, Cooking, Laundry, Shopping, Meal Preparation, Medication Management, Money Management, Transportation       Mental Health Status:  Mental Health Status: No Current Concerns       Chemical Dependency Status:  Chemical Dependency Status: No Current Concerns             Values/Beliefs:  Spiritual, Cultural Beliefs, [employer] Practices, Values that affect care: no               Discussed  Partnership in Safe Discharge Planning   document with patient/family: Yes: patient, daughter Darlene    Additional Information:  Consult for discharge planning.  Therapy recommending TCU.  Met with pt and her daughter, Darlene at bedside.  Pt lives at [facility].  Lunch and dinner provided by [facility].  Medication managed per [facility].  Pt Was independent with ambulation with walker, independent with dressing, grooming, bathing, toileting.  Currently up with 2 and sera steady.      Darlene in agreement that pt needs to go to TCU at discharge.  Provided Darlene with list of in network TCU's for pt [employer] medicare.  Darlene to review list this evening and provide choices tomorrow.    Next Steps: Follow up with family for TCU choices tomorrow    Babita Poole RN, BS  Care Coordinator  [email or web address]

## 2025-04-17 NOTE — CODE/RAPID RESPONSE
[facility]    RRT Note  4/17/2025   Time Called: 1801    Code Status: No CPR- Do NOT Intubate    I was called to evaluate Rayne Mabry, who is a 89 year old female who was admitted on 4/15/2025 after a fall at her assisted living facility. 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    Assessment & Plan   Atrial fibrillation with RVR  I was paged to come evaluate Ms. Mabry as part of a rapid response call for increased heart rate. Ms. Mabry was originally admitted on 4/15 after sustaining a fall at her assisted living facility. She was found to have acute nondisplaced to minimally displaced left pubic rami fractures, as well as probable nondisplaced left sacral alar fracture. Orthopedics was consulted and recommend non-operative management.   On 4/17/2025, shortly before 1800, it was noted that Ms. Mabry's heart rate had increased to the 160s-180s, prompting the nurse to call an RRT.   Upon my arrival, Ms. Mabry was laying in the bed and did not appear to be in acute distress. She reports having palpitations and mild shortness of breath, but denies chest pain, lightheadedness or nausea. An EKG was completed, confirming the presence of a-fib with RVR. She was subsequently given a one time dose of 2.5 mg of metoprolol. Her blood pressure did drop afterwards to a systolic of 78, however, this quickly improved and subsequent SBP was 103. She was also given a 500 ml IV fluid bolus. Her heart rate did improve somewhat after the metoprolol, but remains elevated in the 140s. Lactate is normal at 1.3 and magnesium was 2.0. I provided bedside handoff to my colleague Rodriguez Mercedes who will resume her care. Plan is to try additional  2.5 mg IV metoprolol with possible transfer from ortho/spine unit to heart center.     INTERVENTIONS:  -500 ml LR  -EKG  -2.5 mg IV metoprolol  -Lactic acid  -CBC  -BMP  -Echocardiogram  -Transfer to heart center    Discussed with and defer further cares to Rodriguez Fox NP  [facility]  Securely message with the Vocera Web Console (learn more here)  Text page via [facility] Paging/Directory    Physical Exam   Vital Signs with Ranges:  Temp:  [97  F (36.1  C)-98.1  F (36.7  C)] 97.7  F (36.5  C)  Pulse:  [50-98] 50  Resp:  [16] 16  BP: ()/(57-67) 91/57  SpO2:  [93 %-96 %] 93 %  I/O last 3 completed shifts:  In: 120 [P.O.:120]  Out: 500 [Urine:500]  Physical Exam  Vitals reviewed.   Constitutional:       General: She is not in acute distress.  Cardiovascular:      Rate and Rhythm: Tachycardia present. Rhythm irregular.      Pulses: Normal pulses.      Heart sounds: Normal heart sounds.   Pulmonary:      Effort: Pulmonary effort is normal.      Breath sounds: Normal breath sounds and air entry.   Abdominal:      General: Bowel sounds are normal.      Palpations: Abdomen is soft.      Tenderness: There is no abdominal tenderness.   Skin:     General: Skin is warm and dry.   Neurological:      General: No focal deficit present.      Mental Status: She is alert.   Psychiatric:         Attention and Perception: Attention normal.         Mood and Affect: Mood normal.         Speech: Speech normal.         Behavior: Behavior normal. Behavior is cooperative.     Data   IMAGING: (X-ray/CT/MRI)   No results found for this or any previous visit (from the past 24 hours).    CBC with Diff:  Recent Labs   Lab Test 04/17/25  0904 04/16/25  0836   WBC 13.3* 16.9*   HGB 12.4 13.4   MCV  --  95   PLT  --  226      Absolute Retic (10e9/L)   Date Value   05/17/2011 31.0     % Retic (%)   Date Value   05/17/2011 0.9       Comprehensive Metabolic Panel:  Recent Labs   Lab  04/17/25  1810 04/17/25  0904 04/15/25  1946 04/15/25  1944   NA  --  139   < > 138   POTASSIUM  --  3.6   < > 4.3   CHLORIDE  --  103   < > 101   CO2  --  22   < > 26   ANIONGAP  --  14   < > 11   * 87   < > 109*   BUN  --  19.3   < > 15.7   CR  --  0.62   < > 0.62   GFRESTIMATED  --  82   < > 82   MICHELLE  --  8.9   < > 9.8   PROTTOTAL  --   --   --  7.0   ALBUMIN  --   --   --  3.7   BILITOTAL  --   --   --  0.5   ALKPHOS  --   --   --  69   AST  --   --   --  29   ALT  --   --   --  31    < > = values in this interval not displayed.         Time Spent on this Encounter   CRITICAL CARE TIME  I spent 45 minutes of critical care time on the unit/floor managing the care of Rayne Mabry. Upon evaluation, this patient had a high probability of imminent or life-threatening deterioration which required my direct attention, intervention, and personal management. 100% of my time was spent at the bedside counseling the patient and/or coordinating care regarding services listed in this note.    details… detailed exam

## 2025-04-17 NOTE — PROGRESS NOTES
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    Suspect mechanical fall at care facility.    Physical deconditioning from medical illness, senile frailty  Acute nondisplaced to minimally displaced left pubic rami fractures.   Probable nondisplaced left sacral alar fracture.   Severe spinal canal [id number].  History of osteoporosis.  Patient resident of assisted living facility, independent and ambulates with walker.  -Per family report, patient received a phone call and was walking fast from a room to a different room to answer the call, might have tripped on the cord and fell.  Patient complained of left upper hip pain.  --CT head no intracranial hemorrhage, mass lesions, hydrocephalus or CT evidence for an acute infarct.    --CT C-spine no fracture or posttraumatic subluxation.  Severe spinal canal narrowing at C5-C6. Moderate to severe bilateral neuroforaminal narrowing at C5-C6.  -X-ray of the pelvis showed there is soft tissue mineralization along the lateral aspect of the left thigh and proximal femur.    --CT left hip without contrast  No evidence of any acute or displaced periprosthetic fracture involving the proximal femur.  Previously seen irregularity on x-ray was likely summation artifact.  Acute nondisplaced due to minimally displaced left-sided pubic rami fractures seen.  Probable nondisplaced left sacral rober fracture.  Bones are markedly demineralized.  --Patient lying in bed, denies any pain.  Sitter by bedside.  -  Orthopedic surgery followed, Non-operative management recommended from the orthopedic standpoint regarding pubic rami fractures, and left sacral ala. Appreciate comanagement.  -Weightbearing as tolerated right lower extremity.  -Scheduled Tylenol 3 times daily.  -Gabapentin 100 mg twice daily ordered.  -Lidocaine patch topical application.  -As needed Atarax every 6 hours as needed for adjuvant pain.  -As needed Robaxin as needed for muscle spasms  Trial of oxycodone low-dose 2.5 mg every 6 hours as needed for moderate to severe pain (use if no relief with Atarax/muscle relaxants).  Minimize narcotic use as able to.  Discontinued IV narcotics.    Continue PTA vitamin D supplements.   Hold PTA alendronate.   Rehab team evaluation.  Fall precautions.  Follow up with specialist at [facility] in 6 weeks for repeat x-rays and reevaluation. Please call [phone] ([place]) or [phone] ([place]) to schedule     Acute encephalopathy likely multifactorial from pain/hospitalization/narcotic use.  Baseline dementia.  History of anxiety, depression.  At baseline dementia, forgetful.  Per nursing report patient impulsive, attempting to pull oxy mask.  As needed sitter by bedside.  Delirium precautions.  Minimize narcotic use as able to.  Continue PTA Zoloft.  As needed Seroquel ordered    Acute hypoxic respiratory failure likely from community-acquired pneumonia, underlying chronic lung infection/bronchiectasis.  Possible pneumonia community-acquired versus aspiration.  History of Mycobacterium AviuM complex infection in 1996 treated with no recurrence  History of bronchiectasis  Leukocytosis  Mild elevated troponin likely from demand ischemia from fall.  Per family report has had low oxygen levels in the past, not on oxygen at baseline.  In ED at the time of evaluation afebrile, O2 sats 89% on 4 L, with 6 L improved to 94%.  WBC 11.9, lactic acid 0.6, procalcitonin 0.10.  pH 7.37, pCO2 51.  Troponin  high-sensitivity 21 >21, N-terminal proBNP 218.  CT chest showed no PE or acute traumatic findings in the chest. Findings suggestive of airway infection/inflammation as described, which can be seen in the setting of Mycobacterium AVM intracellular. Scattered mucous plugging and airway centric nodular opacities and tree-in-bud. No lobar consolidation. Unchanged biapical scarring. No pleural effusion or pneumothorax.   -- Continues to require supplemental nasal oxygen, 4 L this morning  Continue intravenous ceftriaxone, azithromycin.  [4/15].  Blood cultures no growth to date.  Telemetry monitoring.  Follow clinical improvement and accordingly de-escalate antibiotics. Trend WBC count, fever curve.  Wean off supplemental nasal oxygen.  Minimize narcotic use.  I-S, Acapella use.  Subcutaneous Lovenox for DVT prophylaxis.  Speech therapy following.  Aspiration precautions.    History of polyarteritis nodosa biopsy-proven;  History of neuropathy secondary to above;  Continue Imuran and gabapentin    Goals of care.  Initiated goals of care discussion with patient's daughter on 4/16, restorative care and would like  trial of rehabilitation at this time.     Clinically Significant Risk Factors        Orders Placed This Encounter      Combination Diet Easy to Chew (level 7); Thin Liquids (level 0) (Eat only when alert and upright, close assistance for positioning and set up.  Straw ok with single sips at a time.)      DVT Prophylaxis: SCDs, subcutaneous Lovenox  Code Status: No CPR- Do NOT Intubate  Disposition: Expected discharge in 2 days pending clinical improvement, stable discharge planning please    Medically Ready for Discharge: Anticipated in 2-4 Days     Discussed with patient, bedside RN, care team.  Updated patient's daughter by the bedside 4/16.  Time greater than 51 minutes. More than 70% of time spent in direct patient care, care coordination, patient counseling, and formalizing plan of care.        Tone  MD Haroldo        Interval History:        Patient lying in bed.  Unable to answer simple questions  Having sitter by bedside, impulsive, per report pulling nasal cannula/IV.  This morning on 4 L nasal oxygen, O2 sats of 91%.  Unable to obtain review of systems.  Does not appear to be in pain  Has not ambulated out of bed yet.  Telemetry sinus rhythm with PVCs.  On modified diet.  Receiving scheduled Tylenol.          Physical Exam:        Physical Exam   Temp:  [97  F (36.1  C)-98.1  F (36.7  C)] 97  F (36.1  C)  Pulse:  [96-98] 96  Resp:  [16] 16  BP: (108-112)/(65-67) 108/67  SpO2:  [93 %-96 %] 96 %    Intake/Output Summary (Last 24 hours) at 4/16/2025 1015  Last data filed at 4/16/2025 0522  Gross per 24 hour   Intake --   Output 220 ml   Net -220 ml     PHYSICAL EXAM  GENERAL: Patient is in no distress.  Unable to answer simple questions  HEENT: Oropharynx pink, moist.   HEART: Regular rate and rhythm. S1S2.   LUNGS: Slightly decreased breath sounds, no wheezing or crackles  Respirations unlabored  ABDOMEN: Soft, no abdominal tenderness, bowel sounds heard   NEURO: Moving all extremities  EXTREMITIES: No pedal edema.  SKIN: Warm, dry. + bruising.  PSYCHIATRY Cooperative       Medications:        Current Facility-Administered Medications   Medication Dose Route Frequency Provider Last Rate Last Admin    acetaminophen (TYLENOL) tablet 975 mg  975 mg Oral Q8H Sharona Pearl MD   975 mg at 04/17/25 1350    azaTHIOprine (IMURAN) tablet 50 mg  50 mg Oral Daily Sharona Pearl MD   50 mg at 04/17/25 0850    azithromycin (ZITHROMAX) tablet 250 mg  250 mg Oral Daily Sharona Pearl MD        calcium carbonate 500 mg (elemental) (OSCAL) tablet 500 mg  500 mg Oral Daily Sharona Pearl MD   500 mg at 04/17/25 0850    cefTRIAXone (ROCEPHIN) 2 g vial to attach to  ml bag for ADULTS or NS 50 ml bag for PEDS  2 g Intravenous Q24H Sharona Pearl MD   2 g at 04/16/25 1729    enoxaparin ANTICOAGULANT (LOVENOX) injection 40  mg  40 mg Subcutaneous Q24H Tone Zarco MD   40 mg at 04/16/25 1729    fluticasone (FLONASE) 50 MCG/ACT spray 2 spray  2 spray Both Nostrils Daily Sharona Pearl MD   2 spray at 04/17/25 0858    fluticasone-vilanterol (BREO ELLIPTA) 200-25 MCG/ACT inhaler 1 puff  1 puff Inhalation Daily Sharona Pearl MD   1 puff at 04/17/25 0859    gabapentin (NEURONTIN) capsule 100 mg  100 mg Oral BID Tone Zarco MD   100 mg at 04/17/25 0850    ipratropium (ATROVENT) 0.06 % spray 2 spray  2 spray Both Nostrils BID Sharona Pearl MD   2 spray at 04/17/25 0858    levothyroxine (SYNTHROID/LEVOTHROID) tablet 75 mcg  75 mcg Oral Daily Sharona Pearl MD   75 mcg at 04/17/25 0850    Lidocaine (LIDOCARE) 4 % Patch 1 patch  1 patch Transdermal Q24H Tone Zarco MD   1 patch at 04/17/25 0850    senna-docusate (SENOKOT-S/PERICOLACE) 8.6-50 MG per tablet 2 tablet  2 tablet Oral BID Tone Zarco MD   2 tablet at 04/17/25 0850    sertraline (ZOLOFT) tablet 25 mg  25 mg Oral At Bedtime Sharona Pearl MD   25 mg at 04/16/25 2100    sodium chloride (PF) 0.9% PF flush 3 mL  3 mL Intracatheter Q8H SUKI Sharona Pearl MD   3 mL at 04/16/25 2108    Vitamin D3 (CHOLECALCIFEROL) tablet 125 mcg  125 mcg Oral Daily Sharona Pearl MD   125 mcg at 04/17/25 0850     Current Facility-Administered Medications   Medication Dose Route Frequency Provider Last Rate Last Admin    bisacodyl (DULCOLAX) suppository 10 mg  10 mg Rectal Daily PRN Sharona Pearl MD        bismuth subsalicylate (PEPTO BISMOL) chewable tablet 524 mg  2 tablet Oral Q1H PRN Sharona Pearl MD        calcium carbonate (TUMS) chewable tablet 1,000 mg  1,000 mg Oral 4x Daily PRN Sharona Pearl MD        HYDROmorphone (DILAUDID) injection 0.2 mg  0.2 mg Intravenous Q6H PRN Tone Zarco MD        hydrOXYzine HCl (ATARAX) tablet 25 mg  25 mg Oral Q6H PRN Tone Zarco MD        Or    hydrOXYzine HCl (ATARAX) tablet 50 mg  50 mg Oral Q6H PRN Tone Zarco MD         lidocaine (LMX4) cream   Topical Q1H PRN Sharona Pearl MD        lidocaine 1 % 0.1-1 mL  0.1-1 mL Other Q1H PRN Sharona Pearl MD        methocarbamol (ROBAXIN) tablet 500 mg  500 mg Oral Q8H PRN Tone Zarco MD        naloxone (NARCAN) injection 0.2 mg  0.2 mg Intravenous Q2 Min PRN Sharona Pearl MD        Or    naloxone (NARCAN) injection 0.4 mg  0.4 mg Intravenous Q2 Min PRN Sharona Pearl MD        Or    naloxone (NARCAN) injection 0.2 mg  0.2 mg Intramuscular Q2 Min PRN Sharona Pearl MD        Or    naloxone (NARCAN) injection 0.4 mg  0.4 mg Intramuscular Q2 Min PRN Sharona Pearl MD        ondansetron (ZOFRAN ODT) ODT tab 4 mg  4 mg Oral Q6H PRN Sharona Pearl MD        Or    ondansetron (ZOFRAN) injection 4 mg  4 mg Intravenous Q6H PRN Sharona Pearl MD        oxyCODONE IR (ROXICODONE) half-tab 2.5 mg  2.5 mg Oral Q6H PRN Tone Zarco MD        polyethylene glycol (MIRALAX) Packet 17 g  17 g Oral BID PRN Sharona Pearl MD        prochlorperazine (COMPAZINE) injection 5 mg  5 mg Intravenous Q6H PRN Sharona Pearl MD        Or    prochlorperazine (COMPAZINE) tablet 5 mg  5 mg Oral Q6H PRN Sharona eParl MD        sodium chloride (PF) 0.9% PF flush 3 mL  3 mL Intracatheter q1 min prn Sharona Pearl MD                Data:      All new lab and imaging data was reviewed.

## 2025-04-18 ENCOUNTER — APPOINTMENT (OUTPATIENT)
Dept: SPEECH THERAPY | Facility: CLINIC | Age: OVER 89
DRG: 535 | End: 2025-04-18
Payer: COMMERCIAL

## 2025-04-18 ENCOUNTER — APPOINTMENT (OUTPATIENT)
Dept: CARDIOLOGY | Facility: CLINIC | Age: OVER 89
DRG: 535 | End: 2025-04-18
Payer: COMMERCIAL

## 2025-04-18 ENCOUNTER — APPOINTMENT (OUTPATIENT)
Dept: PHYSICAL THERAPY | Facility: CLINIC | Age: OVER 89
DRG: 535 | End: 2025-04-18
Payer: COMMERCIAL

## 2025-04-18 VITALS
SYSTOLIC BLOOD PRESSURE: 112 MMHG | HEIGHT: 64 IN | WEIGHT: 113.32 LBS | BODY MASS INDEX: 19.35 KG/M2 | TEMPERATURE: 98.3 F | DIASTOLIC BLOOD PRESSURE: 67 MMHG | RESPIRATION RATE: 18 BRPM | OXYGEN SATURATION: 97 % | HEART RATE: 96 BPM

## 2025-04-18 LAB
LVEF ECHO: NORMAL
PHOSPHATE SERPL-MCNC: 2.2 MG/DL (ref 2.5–4.5)
POTASSIUM SERPL-SCNC: 3.6 MMOL/L (ref 3.4–5.3)
POTASSIUM SERPL-SCNC: 3.8 MMOL/L (ref 3.4–5.3)
TROPONIN T SERPL HS-MCNC: 32 NG/L
WBC # BLD AUTO: 12.5 10E3/UL (ref 4–11)

## 2025-04-18 PROCEDURE — 93325 DOPPLER ECHO COLOR FLOW MAPG: CPT | Mod: 26 | Performed by: INTERNAL MEDICINE

## 2025-04-18 PROCEDURE — 250N000013 HC RX MED GY IP 250 OP 250 PS 637: Performed by: INTERNAL MEDICINE

## 2025-04-18 PROCEDURE — 250N000009 HC RX 250: Performed by: HOSPITALIST

## 2025-04-18 PROCEDURE — 93321 DOPPLER ECHO F-UP/LMTD STD: CPT

## 2025-04-18 PROCEDURE — 99233 SBSQ HOSP IP/OBS HIGH 50: CPT | Performed by: HOSPITALIST

## 2025-04-18 PROCEDURE — 250N000011 HC RX IP 250 OP 636: Performed by: INTERNAL MEDICINE

## 2025-04-18 PROCEDURE — 84484 ASSAY OF TROPONIN QUANT: CPT | Performed by: HOSPITALIST

## 2025-04-18 PROCEDURE — 93321 DOPPLER ECHO F-UP/LMTD STD: CPT | Mod: 26 | Performed by: INTERNAL MEDICINE

## 2025-04-18 PROCEDURE — 93325 DOPPLER ECHO COLOR FLOW MAPG: CPT

## 2025-04-18 PROCEDURE — 258N000003 HC RX IP 258 OP 636: Performed by: HOSPITALIST

## 2025-04-18 PROCEDURE — 85048 AUTOMATED LEUKOCYTE COUNT: CPT | Performed by: HOSPITALIST

## 2025-04-18 PROCEDURE — 93308 TTE F-UP OR LMTD: CPT | Mod: 26 | Performed by: INTERNAL MEDICINE

## 2025-04-18 PROCEDURE — 92526 ORAL FUNCTION THERAPY: CPT | Mod: GN | Performed by: SPEECH-LANGUAGE PATHOLOGIST

## 2025-04-18 PROCEDURE — 250N000011 HC RX IP 250 OP 636: Performed by: HOSPITALIST

## 2025-04-18 PROCEDURE — 84100 ASSAY OF PHOSPHORUS: CPT | Performed by: NURSE PRACTITIONER

## 2025-04-18 PROCEDURE — 84132 ASSAY OF SERUM POTASSIUM: CPT | Performed by: HOSPITALIST

## 2025-04-18 PROCEDURE — 97530 THERAPEUTIC ACTIVITIES: CPT | Mod: GP

## 2025-04-18 PROCEDURE — 250N000012 HC RX MED GY IP 250 OP 636 PS 637: Performed by: INTERNAL MEDICINE

## 2025-04-18 PROCEDURE — 210N000002 HC R&B HEART CARE

## 2025-04-18 PROCEDURE — 250N000013 HC RX MED GY IP 250 OP 250 PS 637: Performed by: HOSPITALIST

## 2025-04-18 PROCEDURE — 36415 COLL VENOUS BLD VENIPUNCTURE: CPT | Performed by: HOSPITALIST

## 2025-04-18 RX ORDER — HYDROXYZINE HYDROCHLORIDE 10 MG/1
10 TABLET, FILM COATED ORAL 3 TIMES DAILY PRN
Status: DISCONTINUED | OUTPATIENT
Start: 2025-04-18 | End: 2025-04-21 | Stop reason: HOSPADM

## 2025-04-18 RX ORDER — METOPROLOL TARTRATE 1 MG/ML
2.5 INJECTION, SOLUTION INTRAVENOUS EVERY 4 HOURS PRN
Status: DISCONTINUED | OUTPATIENT
Start: 2025-04-18 | End: 2025-04-21 | Stop reason: HOSPADM

## 2025-04-18 RX ORDER — POTASSIUM CHLORIDE 7.45 MG/ML
10 INJECTION INTRAVENOUS
Status: COMPLETED | OUTPATIENT
Start: 2025-04-18 | End: 2025-04-18

## 2025-04-18 RX ORDER — ASPIRIN 81 MG/1
81 TABLET ORAL DAILY
Status: DISCONTINUED | OUTPATIENT
Start: 2025-04-18 | End: 2025-04-21 | Stop reason: HOSPADM

## 2025-04-18 RX ADMIN — METOPROLOL TARTRATE 2.5 MG: 5 INJECTION INTRAVENOUS at 22:00

## 2025-04-18 RX ADMIN — SERTRALINE HYDROCHLORIDE 25 MG: 25 TABLET ORAL at 21:38

## 2025-04-18 RX ADMIN — GABAPENTIN 100 MG: 100 CAPSULE ORAL at 09:09

## 2025-04-18 RX ADMIN — AZITHROMYCIN DIHYDRATE 250 MG: 250 TABLET ORAL at 17:36

## 2025-04-18 RX ADMIN — LEVOTHYROXINE SODIUM 75 MCG: 75 TABLET ORAL at 09:10

## 2025-04-18 RX ADMIN — SODIUM PHOSPHATE, MONOBASIC, MONOHYDRATE AND SODIUM PHOSPHATE, DIBASIC, ANHYDROUS 9 MMOL: 142; 276 INJECTION, SOLUTION INTRAVENOUS at 04:45

## 2025-04-18 RX ADMIN — ACETAMINOPHEN 975 MG: 325 TABLET, FILM COATED ORAL at 10:57

## 2025-04-18 RX ADMIN — POTASSIUM CHLORIDE 10 MEQ: 7.46 INJECTION, SOLUTION INTRAVENOUS at 02:24

## 2025-04-18 RX ADMIN — IPRATROPIUM BROMIDE 2 SPRAY: 42 SPRAY NASAL at 21:47

## 2025-04-18 RX ADMIN — Medication 125 MCG: at 09:09

## 2025-04-18 RX ADMIN — POTASSIUM CHLORIDE 10 MEQ: 7.46 INJECTION, SOLUTION INTRAVENOUS at 03:30

## 2025-04-18 RX ADMIN — FLUTICASONE PROPIONATE 2 SPRAY: 50 SPRAY, METERED NASAL at 09:10

## 2025-04-18 RX ADMIN — AZATHIOPRINE 50 MG: 50 TABLET ORAL at 09:09

## 2025-04-18 RX ADMIN — FLUTICASONE FUROATE AND VILANTEROL TRIFENATATE 1 PUFF: 200; 25 POWDER RESPIRATORY (INHALATION) at 09:12

## 2025-04-18 RX ADMIN — ASPIRIN 81 MG: 81 TABLET, COATED ORAL at 14:16

## 2025-04-18 RX ADMIN — METOPROLOL TARTRATE 12.5 MG: 25 TABLET, FILM COATED ORAL at 21:38

## 2025-04-18 RX ADMIN — CEFTRIAXONE SODIUM 2 G: 2 INJECTION, POWDER, FOR SOLUTION INTRAMUSCULAR; INTRAVENOUS at 17:36

## 2025-04-18 RX ADMIN — CALCIUM 500 MG: 500 TABLET ORAL at 09:09

## 2025-04-18 RX ADMIN — SENNOSIDES AND DOCUSATE SODIUM 2 TABLET: 50; 8.6 TABLET ORAL at 09:09

## 2025-04-18 RX ADMIN — ENOXAPARIN SODIUM 40 MG: 40 INJECTION SUBCUTANEOUS at 17:36

## 2025-04-18 RX ADMIN — POTASSIUM CHLORIDE 10 MEQ: 7.46 INJECTION, SOLUTION INTRAVENOUS at 14:12

## 2025-04-18 RX ADMIN — ACETAMINOPHEN 975 MG: 325 TABLET, FILM COATED ORAL at 17:36

## 2025-04-18 RX ADMIN — IPRATROPIUM BROMIDE 2 SPRAY: 42 SPRAY NASAL at 09:19

## 2025-04-18 RX ADMIN — GABAPENTIN 100 MG: 100 CAPSULE ORAL at 21:37

## 2025-04-18 ASSESSMENT — ACTIVITIES OF DAILY LIVING (ADL)
ADLS_ACUITY_SCORE: 59
ADLS_ACUITY_SCORE: 51
ADLS_ACUITY_SCORE: 51
ADLS_ACUITY_SCORE: 64
ADLS_ACUITY_SCORE: 68
ADLS_ACUITY_SCORE: 64
ADLS_ACUITY_SCORE: 51
ADLS_ACUITY_SCORE: 59
ADLS_ACUITY_SCORE: 51
ADLS_ACUITY_SCORE: 51
ADLS_ACUITY_SCORE: 64
ADLS_ACUITY_SCORE: 64
ADLS_ACUITY_SCORE: 51
ADLS_ACUITY_SCORE: 59
ADLS_ACUITY_SCORE: 64
ADLS_ACUITY_SCORE: 51
ADLS_ACUITY_SCORE: 51
ADLS_ACUITY_SCORE: 64

## 2025-04-18 NOTE — CARE PLAN
Cardiology Note:    This is a 89-year-old female with a history of reactive airway disease, hypothyroidism, anxiety, depression, polyarthritis, left foot drop, neuropathy and other comorbidities who presented from her assisted living after suffering a fall resulting in nondisplaced left pubic rami fractures, left sacral alar fracture, severe spinal stenosis and osteoporosis.  She developed encephalopathy, acute hypoxic respiratory failure, leukocytosis.  She also developed transient atrial fibrillation with rapid ventricular response that responded well to IV Cardizem and metoprolol.  This is likely driven by her pain, infection, and heightened metabolic state. With this she had Non myocardial injury pattern of troponin elevation secondary to AF with RVR.     I personally reviewed the EKG with Dr. Saldaña.  Reviewed the echocardiogram as well which shows normal cardiac function without significant valvular disease.  Patient is at high risk of complications of bleeding on anticoagulation, given her history of falls, fragility, age and neuropathy.The risk of anticoagulation outweighs the benefits.    Recommend very low-dose beta-blocker, baby aspirin daily and no anticoagulation.    I discussed directly with Dr. Saldaña and together we discussed with Dr. Zarco. Patient not seen or examined.     Ester Diaz PA-C

## 2025-04-18 NOTE — PROGRESS NOTES
Admission/Transfer from: Ortho Spine   2 RN skin assessment completed. Yes, Ivy Colon RN  Significant findings include: reddened scabs - upper back, bilateral - forearms, scattered lower extremities, and ankles. Blanchable redness - L elbow.   [facility] Nurse Consult Ordered? No

## 2025-04-18 NOTE — PROGRESS NOTES
Admitting Diagnosis: Mycobacterium avium-intracellulare infection (H) [A31.0]  Acute respiratory failure with hypoxia (H) [J96.01]  Pubic ramus fracture, left, closed, initial encounter (H) [S32.592A]  Periprosthetic fracture of hip, initial encounter [M97.8XXA, Z96.649]  Pneumonia of both lower lobes due to infectious organism [J18.9]  Closed fracture of sacrum, unspecified portion of sacrum, initial encounter (H) [S32.10XA]  Unwitnessed fall [R29.6]   Vitals VSS, except on 2-3L of O2  Pain 4/10. Taking was given PRN oxy. PRN. Last dose 1724  A&Ox3-4  Voiding Yes, via purewick/brief   Mobility Assist of 1 GB/W, WBAT  Tele SR  Lung Sounds Diminished on 3L via 02  GI Active  Dressing CDI    Orders Placed This Encounter      Combination Diet Easy to Chew (level 7); Thin Liquids (level 0) (Eat only when alert and upright, close assistance for positioning and set up.  Straw ok with single sips at a time.)       RRT was called due to tachycardia. See (RRT note) Pt is now being transferred to heart center. Report was given to ortho spine's night nurse.

## 2025-04-18 NOTE — PROGRESS NOTES
Care Management Follow Up    Length of Stay (days): 3    Expected Discharge Date: 04/19/2025     Concerns to be Addressed: discharge planning     Patient plan of care discussed at interdisciplinary rounds: Yes    Anticipated Discharge Disposition: Transitional Care              Anticipated Discharge Services:    Anticipated Discharge DME:      Patient/family educated on Medicare website which has current facility and service quality ratings: yes  Education Provided on the Discharge Plan:    Patient/Family in Agreement with the Plan: yes    Referrals Placed by CM/SW:  post acute facilities   Private pay costs discussed: Not applicable    Discussed  Partnership in Safe Discharge Planning  document with patient/family: No     Handoff Completed: No, handoff not indicated or clinically appropriate    Additional Information:  Call placed to daughter Darlene to discuss TCU. She has not been able to review the full list and will do that today, however is asking for a referral to [facility]. Darlene confirmed she will look over the list and provide additional options for placement this afternoon. Referral sent via [facility] to [facility].    Addendum 1430: Call placed to [facility] and spoke to Melisa. She is waiting to hear from their team if they can accept patient. IF they are able to accept, it would not be until Monday at the earliest since they do not take weekend admissions.     Addendum 1530: met with son (kvng) and Darlene (daughter) to discuss discharge plans. They are really hoping for [facility]. Would be fine with refrrals to [facility] and if needed [facility] friendship baldev and would consider [facility]. Did discuss that once placement is found need to discharge to accepting facility even if it is not preferred. Family aware and understanding. Family aware of private room costs at [facility] ($35/daily) and [facility] ($36/ daily) and are in agreement if patient was accepted.    Call received from Melisa in admissions at [facility].  Patient accept for placement on Monday at [facility]. Would prefer a discharge for  time frame.  on Monday is Shira () at [phone]. Discharge orders need to be hand faxed to [facility] at [phone].  If needed the HUC can be reached at [phone] and main number is [phone].    Met with Linda at bedside and discussed acceptance at [facility]. Family is very pleased with placement and are agreeable to discharge plan for Monday if medically ready. Discussed that facility would want patient around  and they are aware. Discussed transportation options and potential costs associated with [facility] wheelchair or family transport. Shared that an exact amount cannot be given for transport but it could be a flat fee of around $100 and billed roughly $6-8 per mile. Family confirmed that they would want a ride to be arranged at discharge and are in agreement with this. Writer explained she will get a ride set up on Sunday and let them know so they are aware for Monday. Explained that if patient ends up not being medically ready on Monday can always cancel. Family in agreement with plan.      Next Steps: Confirm bed    MYKE Mueller, LICSW  Social Work- Inpatient Care Coordination  [facility]

## 2025-04-18 NOTE — PROGRESS NOTES
House ROMI brief RRT follow up:    Symptomatic, paroxysmal atrial fibrillation with rapid ventricular response suspect multifactorial 2/2 electrolyte abnormality, intravascular depletion, CAP, respiratory failure, acute illness.  Downgoing L lateral mouth; baseline (per pt's son).    Received bedside handoff from colleague, Lane Fox, APRN, CNP, house ROMI; please refer to Dominique's initial RRT note for further details.  At time of arrival, pt lying in bed, awake, alert, in no overt distress with VS noting SBP low 100s, HR 130s-150s, RR 20s, O2 sats > 90% on 4L O2 via NC, afebrile.  Nursing notes pt developed new onset a-fib RVR; had previously been NSR.  At initial onset, pt endorsed acute SOB.  Pt's lung sounds clear throughout, no obvious crackles or wheezes noted, tachycardic, irregular rate and rhythm, normal S1S2, no murmur, rub or gallop noted, abdomen soft, nondistended, nontender to palpation, active bowel sounds.  Pt's two sons and daughter present at pt's bedside; updated on plan of care.  Family confirms DNR/DNI, notes no measures that would inflict prolonged pain such as CPR leading to fracturing of ribs.  Family would be in agreement for medications, vasopressors, central line placement if deemed appropriate.    Of note, pt's family notes pt developed a-fib RVR in 2017 while at [facility]; postoperative state.  Reviewed documentation; states a-fib RVR resolved after pt receiving IV diuretics for 2 days.  Reviewed echocardiogram from 2017, EF 57%, decreased RV systolic function, dilated IVC, elevated PA pressure; suspected probable chronic RV systolic CHF and pulmonary hypertension.    Intervetions:  - Will trial an additional 2.5 mg IV metoprolol now  - Continues on 500 ml LR bolus over 1 hour  - Noted K 3.4, Mg 2.0; will order high K and Mg replacement protocols  - Pt persistently tachycardic, HR 140s; will judiciously trial 10 mg IVP diltiazem (0.25 mg/kg = 12.8 mg).  Will administer low dose in  setting of pt's BP labile with both doses of low dose IV metoprolol.  Noted pt with history of HFpEF, BNP and transaminases WNL on admission, low suspicion for acute, systolic CHF.    - Noted CXR with small bilateral pleural effusions, heart size WNL; pt's family notes history of SOB with very minimal activity.  Will minimize IVF; however, notes pt's oral mucosa profoundly dry.  - Will consider cardiology consultation pending overall clinical situation  - Dominique placed transfer orders to [facility]; pending transfer at this time  - Updated by flying squad at 2017 noting pt converted to NSR, HR 80s, SBP low 100s  - Will transfer pt to [facility] in the event pt develops recurrent a-fib RVR and requires diltiazem infusion  - Noted pt on prophylactic lovenox; will defer adjustment of anticoagulation to rounding hospitalist   - Updated pt's two sons and daughter throughout above, including after transfer    Addendum: 2206: Updated by nursing noting pt restless, agitated, verbally aggressive toward her son.  Presented to pt's bedside.  At time of arrival, pt lying in bed, reports feeling confused, suspect due to recent oxycodone administered earlier this evening in setting of baseline dementia.  Pt's son expresses concern for administering any additional medications at this time; has noted could trial seroquel.  Pt's son in agreement to trial melatonin.  Will order bilateral mitts as pt continues to pull off oxymask.        Recent Labs   Lab 04/17/25  1833 04/17/25  0904 04/16/25  0836 04/15/25  1944   WBC 14.6* 13.3* 16.9* 11.9*   HGB 13.1 12.4 13.4 13.7   HCT 39.1  --  40.9 41.9   MCV 94  --  95 96     --  226 233     Recent Labs   Lab 04/17/25  1833 04/17/25  1810 04/17/25  0904 04/16/25  0836     --  139 140   POTASSIUM 3.4  --  3.6 3.7   CHLORIDE 99  --  103 103   CO2 26  --  22 24   ANIONGAP 12  --  14 13   * 129* 87 126*   BUN 20.2  --  19.3 13.5   CR 0.69  --  0.62 0.62   GFRESTIMATED 80  --  82  82   MICHELLE 9.2  --  8.9 9.0     Recent Labs   Lab 04/15/25  1944   NTBNPI 218     Recent Labs   Lab 04/17/25 1833 04/15/25  1945   LACT 1.3 0.6*     Recent Labs   Lab 04/17/25 1833   TSH 3.02     IMAGING: (X-ray/CT/MRI)   Recent Results (from the past 24 hours)   XR Chest Port 1 View    Narrative    EXAM: XR CHEST PORT 1 VIEW  LOCATION: [facility]  DATE: 4/17/2025    INDICATION: Shortness of breath.  COMPARISON: Chest radiograph 6/30/2019 and CTA chest 4/15/2025.      Impression    IMPRESSION: Heart size is normal. Diffuse bilateral mixed interstitial and alveolar opacities, likely multifocal infection. Small bilateral pleural effusions with bibasilar opacities which could be due to infection and/or atelectasis. No pneumothorax.     HADLEY Latif, CNP  Hospitalist-House ROMI  Hospitalist Service  Securely message with [employer] (more info)  Text page via [facility] Paging/Directory     I spent 70 minutes of critical care time independent of procedures on the unit/floor managing the care of Rayne Mabry. Upon evaluation, this patient had a high probability of imminent or life-threatening deterioration due to a-fib RVR which required my direct attention, intervention, and personal management.

## 2025-04-18 NOTE — PROGRESS NOTES
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    Orthopedic surgery followed, recommend nonoperative intervention.  On 4/17 p.m. patient A-fib with RVR, transferred to heart center -received IV metoprolol, IV Cardizem.    Suspect mechanical fall at care facility.    Physical deconditioning from medical illness, senile frailty  Acute nondisplaced to minimally displaced left pubic rami fractures.   Probable nondisplaced left sacral alar fracture.   Severe spinal canal [id number].  History of osteoporosis.  Patient resident of assisted living facility, independent and ambulates with walker.  -Per family report, patient received a phone call and was walking fast from a room to a different room to answer the call, might have tripped on the cord and fell.  Patient complained of left upper hip pain.  --CT head no intracranial hemorrhage, mass lesions, hydrocephalus or CT evidence for an acute infarct.    --CT C-spine no fracture or posttraumatic subluxation.  Severe spinal canal narrowing at C5-C6. Moderate to severe bilateral neuroforaminal narrowing at C5-C6.  -X-ray of the pelvis showed there is soft tissue mineralization along the lateral aspect of the left thigh and proximal femur.    --CT left hip without contrast  No evidence of any acute or displaced periprosthetic fracture involving the proximal femur.  Previously seen irregularity on x-ray was likely summation artifact.  Acute nondisplaced due to minimally displaced left-sided pubic rami  fractures seen.  Probable nondisplaced left sacral rober fracture.  Bones are markedly demineralized.  -- 4/18 - patient lying in bed. pain controlled.  Sitter by bedside.  - Orthopedic surgery followed, Non-operative management recommended from the orthopedic standpoint regarding pubic rami fractures, and left sacral ala. Appreciate comanagement.  -Weightbearing as tolerated right lower extremity.  -Scheduled Tylenol 3 times daily.  -Gabapentin 100 mg twice daily ordered.  -Lidocaine patch topical application.  -As needed Atarax every 6 hours as needed for adjuvant pain.  -As needed Robaxin as needed for muscle spasms  Discontinue narcotics-family reports has done poorly with narcotics in the past and would not like to administer narcotics.  Continue PTA vitamin D supplements.   Hold PTA alendronate.   Rehab team evaluation.  Fall precautions.  Follow up with specialist at [facility] in 6 weeks for repeat x-rays and reevaluation. Please call [phone] (Adrian) or [phone] (Hunter) to schedule.    Paroxysmal atrial fibrillation with RVR, now in sinus rhythm.  Elevated troponin likely from demand ischemia from fall.  On 4/17 A-fib with RVR, received IV Cardizem, IV metoprolol.   TSH, magnesium within normal limits  This morning in sinus rhythm, will start on metoprolol 12.5 mg twice daily with hold parameters.  Will initiate aspirin 81 mg oral daily, on subcutaneous Lovenox for DVT prophylaxis.  As needed IV metoprolol.  Telemetry monitoring.  Echocardiogram.  Cardiology consultation for recommendations on anticoagulation/ ? Ischemic work up.  Family would like to pursue diagnostic/therapeutic intervention.    Acute hypoxic respiratory failure likely from community-acquired pneumonia, underlying chronic lung infection/bronchiectasis.  Possible pneumonia community-acquired versus aspiration.  History of Mycobacterium AviuM complex infection in 1996 treated with no recurrence  History of  bronchiectasis  Leukocytosis.  Dysphagia  Per family report has had low oxygen levels in the past, not on oxygen at baseline.  In ED at the time of evaluation afebrile, O2 sats 89% on 4 L, with 6 L improved to 94%.  WBC 11.9, lactic acid 0.6, procalcitonin 0.10.  pH 7.37, pCO2 51.  Troponin high-sensitivity 21 >21, N-terminal proBNP 218.  CT chest showed no PE or acute traumatic findings in the chest. Findings suggestive of airway infection/inflammation as described, which can be seen in the setting of Mycobacterium AVM intracellular. Scattered mucous plugging and airway centric nodular opacities and tree-in-bud. No lobar consolidation. Unchanged biapical scarring. No pleural effusion or pneumothorax.   -- Continues to require supplemental nasal oxygen, 3 L this morning  Continue intravenous ceftriaxone, azithromycin.  [4/15].  Blood cultures no growth to date.  Follow clinical improvement and accordingly de-escalate antibiotics. Trend WBC count, fever curve.  Wean off supplemental nasal oxygen.  Minimize narcotic use.  I-S, Acapella use.  Subcutaneous Lovenox for DVT prophylaxis.  Speech therapy following.  Aspiration precautions.    Acute encephalopathy likely multifactorial from pain/hospitalization/narcotic use.  Baseline dementia.  History of anxiety, depression.  At baseline dementia, forgetful.  Per nursing report intermittent impulsive, attempting to pull oxy mask.  As needed sitter by bedside.  Delirium precautions.  Avoid narcotics.  Continue PTA Zoloft.  As needed Seroquel ordered    History of polyarteritis nodosa biopsy-proven;  History of neuropathy secondary to above;  Continue Imuran and gabapentin    Goals of care.  Discussed goals of care with patient, son and daughter on 4/18-continue restorative care -including diagnostic/therapeutic management.  DNR, DNI.    Clinically Significant Risk Factors        Orders Placed This Encounter      Combination Diet Easy to Chew (level 7); Thin Liquids (level  0) (Eat only when alert and upright, close assistance for positioning and set up.  Straw ok with single sips at a time.)      DVT Prophylaxis: SCDs, subcutaneous Lovenox  Code Status: No CPR- Do NOT Intubate  Disposition: Expected discharge in 2 days pending clinical improvement -decreased oxygen requirements, cardiac workup, safe discharge plan in place.    Medically Ready for Discharge: Anticipated in 2-4 Days     Discussed with patient, bedside RN, care team.  Updated patient's son, daughter by the bedside 4/18  More than 70% of time spent in direct patient care, care coordination, patient counseling, and formalizing plan of care.        Tone Zarco MD        Interval History:        Patient lying in bed.  Able to answer yes/no questions.  Having sitter by bedside, this morning calm and cooperative  This morning on 3 L oxime mask, O2 sats of 95%.  Unable to obtain review of systems.  Denies any pain.  Has not ambulated out of bed yet.  Overnight RRT.  Noted A-fib with RVR.  Telemetry sinus rhythm with PVCs.  On modified diet.    Receiving scheduled Tylenol.          Physical Exam:        Physical Exam   Temp:  [97.7  F (36.5  C)-98.3  F (36.8  C)] 98.2  F (36.8  C)  Pulse:  [] 93  Resp:  [15-20] 15  BP: ()/(53-99) 109/72  SpO2:  [92 %-98 %] 98 %    Intake/Output Summary (Last 24 hours) at 4/16/2025 1015  Last data filed at 4/16/2025 0522  Gross per 24 hour   Intake --   Output 220 ml   Net -220 ml     PHYSICAL EXAM  GENERAL: Patient is in no distress.  Awake  HEENT: Oropharynx pink, moist.   HEART: Regular rate and rhythm. S1S2.  Systolic murmur present.  LUNGS: Slightly decreased breath sounds, no wheezing or crackles  Respirations unlabored  ABDOMEN: Soft, no abdominal tenderness, bowel sounds heard   NEURO: Moving all extremities  EXTREMITIES: No pedal edema.  SKIN: Warm, dry. + bruising.  PSYCHIATRY Cooperative       Medications:        Current Facility-Administered Medications   Medication  Dose Route Frequency Provider Last Rate Last Admin    acetaminophen (TYLENOL) tablet 975 mg  975 mg Oral Q8H Sharona Pearl MD   975 mg at 04/18/25 1057    aspirin EC tablet 81 mg  81 mg Oral Daily Tone Zarco MD        azaTHIOprine (IMURAN) tablet 50 mg  50 mg Oral Daily Sharona Pearl MD   50 mg at 04/18/25 0909    azithromycin (ZITHROMAX) tablet 250 mg  250 mg Oral Daily Sharona Pearl MD   250 mg at 04/17/25 1705    calcium carbonate 500 mg (elemental) (OSCAL) tablet 500 mg  500 mg Oral Daily Sharona Pearl MD   500 mg at 04/18/25 0909    cefTRIAXone (ROCEPHIN) 2 g vial to attach to  ml bag for ADULTS or NS 50 ml bag for PEDS  2 g Intravenous Q24H Sharona Pearl MD   2 g at 04/17/25 1705    enoxaparin ANTICOAGULANT (LOVENOX) injection 40 mg  40 mg Subcutaneous Q24H Tone Zarco MD   40 mg at 04/17/25 1705    fluticasone (FLONASE) 50 MCG/ACT spray 2 spray  2 spray Both Nostrils Daily Sharona Pearl MD   2 spray at 04/18/25 0910    fluticasone-vilanterol (BREO ELLIPTA) 200-25 MCG/ACT inhaler 1 puff  1 puff Inhalation Daily Sharona Pearl MD   1 puff at 04/18/25 0912    gabapentin (NEURONTIN) capsule 100 mg  100 mg Oral BID Tone Zarco MD   100 mg at 04/18/25 0909    ipratropium (ATROVENT) 0.06 % spray 2 spray  2 spray Both Nostrils BID Sharona Pearl MD   2 spray at 04/18/25 0919    levothyroxine (SYNTHROID/LEVOTHROID) tablet 75 mcg  75 mcg Oral Daily Sharona Pearl MD   75 mcg at 04/18/25 0910    Lidocaine (LIDOCARE) 4 % Patch 1 patch  1 patch Transdermal Q24H Tone Zarco MD   1 patch at 04/17/25 0850    potassium chloride 10 mEq in 100 mL sterile water infusion  10 mEq Intravenous Q1H Tone Zarco MD        senna-docusate (SENOKOT-S/PERICOLACE) 8.6-50 MG per tablet 2 tablet  2 tablet Oral BID Tone Zarco MD   2 tablet at 04/18/25 0909    sertraline (ZOLOFT) tablet 25 mg  25 mg Oral At Bedtime Sharona Pearl MD   25 mg at 04/16/25 2100    sodium chloride (PF) 0.9% PF  flush 3 mL  3 mL Intracatheter Q8H [facility] Sky Fox, NP        Vitamin D3 (CHOLECALCIFEROL) tablet 125 mcg  125 mcg Oral Daily Sharona Pearl MD   125 mcg at 04/18/25 0909     Current Facility-Administered Medications   Medication Dose Route Frequency Provider Last Rate Last Admin    bisacodyl (DULCOLAX) suppository 10 mg  10 mg Rectal Daily PRN Sharona Pearl MD        bismuth subsalicylate (PEPTO BISMOL) chewable tablet 524 mg  2 tablet Oral Q1H PRN Sharona Pearl MD        calcium carbonate (TUMS) chewable tablet 1,000 mg  1,000 mg Oral 4x Daily PRN Sharona Pearl MD        hydrOXYzine HCl (ATARAX) tablet 10 mg  10 mg Oral TID PRN Tone Zarco MD        lidocaine (LMX4) cream   Topical Q1H PRN Sky Fox NP        lidocaine 1 % 0.1-1 mL  0.1-1 mL Other Q1H PRN Sky Fox NP        melatonin tablet 5 mg  5 mg Oral At Bedtime PRN Rodriguez Mercedes, APRN VI        methocarbamol (ROBAXIN) tablet 500 mg  500 mg Oral Q8H PRN Tone Zarco MD        naloxone (NARCAN) injection 0.2 mg  0.2 mg Intravenous Q2 Min PRN Sharona Pearl MD        Or    naloxone (NARCAN) injection 0.4 mg  0.4 mg Intravenous Q2 Min PRN Sharona Pearl MD        Or    naloxone (NARCAN) injection 0.2 mg  0.2 mg Intramuscular Q2 Min PRN Sharona Pearl MD        Or    naloxone (NARCAN) injection 0.4 mg  0.4 mg Intramuscular Q2 Min PRN Sharona Pearl MD        ondansetron (ZOFRAN ODT) ODT tab 4 mg  4 mg Oral Q6H PRN Sharona Pearl MD        Or    ondansetron (ZOFRAN) injection 4 mg  4 mg Intravenous Q6H PRN Sharona Pearl MD        polyethylene glycol (MIRALAX) Packet 17 g  17 g Oral BID PRN Sharona Pearl MD        prochlorperazine (COMPAZINE) injection 5 mg  5 mg Intravenous Q6H PRN Sharona Pearl MD        Or    prochlorperazine (COMPAZINE) tablet 5 mg  5 mg Oral Q6H PRN Sharona Pearl MD        QUEtiapine (SEROquel) half-tab 12.5 mg  12.5 mg Oral BID PRN Tone Zarco MD         sodium chloride (PF) 0.9% PF flush 3 mL  3 mL Intracatheter q1 min prn Sky Fox NP                Data:      All new lab and imaging data was reviewed.

## 2025-04-19 LAB
PHOSPHATE SERPL-MCNC: 2.1 MG/DL (ref 2.5–4.5)
POTASSIUM SERPL-SCNC: 3.7 MMOL/L (ref 3.4–5.3)

## 2025-04-19 PROCEDURE — 250N000013 HC RX MED GY IP 250 OP 250 PS 637: Performed by: INTERNAL MEDICINE

## 2025-04-19 PROCEDURE — 258N000003 HC RX IP 258 OP 636: Performed by: STUDENT IN AN ORGANIZED HEALTH CARE EDUCATION/TRAINING PROGRAM

## 2025-04-19 PROCEDURE — 250N000011 HC RX IP 250 OP 636: Performed by: INTERNAL MEDICINE

## 2025-04-19 PROCEDURE — 99232 SBSQ HOSP IP/OBS MODERATE 35: CPT | Performed by: STUDENT IN AN ORGANIZED HEALTH CARE EDUCATION/TRAINING PROGRAM

## 2025-04-19 PROCEDURE — 250N000013 HC RX MED GY IP 250 OP 250 PS 637: Performed by: HOSPITALIST

## 2025-04-19 PROCEDURE — 210N000002 HC R&B HEART CARE

## 2025-04-19 PROCEDURE — 250N000011 HC RX IP 250 OP 636: Performed by: HOSPITALIST

## 2025-04-19 PROCEDURE — 250N000012 HC RX MED GY IP 250 OP 636 PS 637: Performed by: INTERNAL MEDICINE

## 2025-04-19 PROCEDURE — 36415 COLL VENOUS BLD VENIPUNCTURE: CPT | Performed by: HOSPITALIST

## 2025-04-19 PROCEDURE — 250N000013 HC RX MED GY IP 250 OP 250 PS 637: Performed by: STUDENT IN AN ORGANIZED HEALTH CARE EDUCATION/TRAINING PROGRAM

## 2025-04-19 PROCEDURE — 84100 ASSAY OF PHOSPHORUS: CPT | Performed by: HOSPITALIST

## 2025-04-19 PROCEDURE — 84132 ASSAY OF SERUM POTASSIUM: CPT | Performed by: HOSPITALIST

## 2025-04-19 PROCEDURE — 250N000009 HC RX 250: Performed by: STUDENT IN AN ORGANIZED HEALTH CARE EDUCATION/TRAINING PROGRAM

## 2025-04-19 RX ORDER — POTASSIUM CHLORIDE 1.5 G/1.58G
20 POWDER, FOR SOLUTION ORAL ONCE
Status: COMPLETED | OUTPATIENT
Start: 2025-04-19 | End: 2025-04-19

## 2025-04-19 RX ADMIN — AZITHROMYCIN DIHYDRATE 250 MG: 250 TABLET ORAL at 17:59

## 2025-04-19 RX ADMIN — CEFTRIAXONE SODIUM 2 G: 2 INJECTION, POWDER, FOR SOLUTION INTRAMUSCULAR; INTRAVENOUS at 17:58

## 2025-04-19 RX ADMIN — SENNOSIDES AND DOCUSATE SODIUM 2 TABLET: 50; 8.6 TABLET ORAL at 11:05

## 2025-04-19 RX ADMIN — ACETAMINOPHEN 975 MG: 325 TABLET, FILM COATED ORAL at 11:05

## 2025-04-19 RX ADMIN — SODIUM PHOSPHATE, MONOBASIC, MONOHYDRATE AND SODIUM PHOSPHATE, DIBASIC, ANHYDROUS 9 MMOL: 142; 276 INJECTION, SOLUTION INTRAVENOUS at 11:27

## 2025-04-19 RX ADMIN — ACETAMINOPHEN 975 MG: 325 TABLET, FILM COATED ORAL at 17:59

## 2025-04-19 RX ADMIN — METOPROLOL TARTRATE 12.5 MG: 25 TABLET, FILM COATED ORAL at 20:12

## 2025-04-19 RX ADMIN — AZATHIOPRINE 50 MG: 50 TABLET ORAL at 11:05

## 2025-04-19 RX ADMIN — IPRATROPIUM BROMIDE 2 SPRAY: 42 SPRAY NASAL at 20:17

## 2025-04-19 RX ADMIN — METOPROLOL TARTRATE 12.5 MG: 25 TABLET, FILM COATED ORAL at 11:05

## 2025-04-19 RX ADMIN — SENNOSIDES AND DOCUSATE SODIUM 2 TABLET: 50; 8.6 TABLET ORAL at 20:12

## 2025-04-19 RX ADMIN — LIDOCAINE 1 PATCH: 4 PATCH TOPICAL at 11:23

## 2025-04-19 RX ADMIN — LEVOTHYROXINE SODIUM 75 MCG: 75 TABLET ORAL at 11:05

## 2025-04-19 RX ADMIN — CALCIUM 500 MG: 500 TABLET ORAL at 11:05

## 2025-04-19 RX ADMIN — SERTRALINE HYDROCHLORIDE 25 MG: 25 TABLET ORAL at 20:11

## 2025-04-19 RX ADMIN — GABAPENTIN 100 MG: 100 CAPSULE ORAL at 11:05

## 2025-04-19 RX ADMIN — ENOXAPARIN SODIUM 40 MG: 40 INJECTION SUBCUTANEOUS at 17:59

## 2025-04-19 RX ADMIN — IPRATROPIUM BROMIDE 2 SPRAY: 42 SPRAY NASAL at 11:22

## 2025-04-19 RX ADMIN — FLUTICASONE FUROATE AND VILANTEROL TRIFENATATE 1 PUFF: 200; 25 POWDER RESPIRATORY (INHALATION) at 11:22

## 2025-04-19 RX ADMIN — GABAPENTIN 100 MG: 100 CAPSULE ORAL at 20:11

## 2025-04-19 RX ADMIN — ACETAMINOPHEN 975 MG: 325 TABLET, FILM COATED ORAL at 07:24

## 2025-04-19 RX ADMIN — POTASSIUM CHLORIDE 20 MEQ: 1.5 POWDER, FOR SOLUTION ORAL at 11:05

## 2025-04-19 RX ADMIN — ASPIRIN 81 MG: 81 TABLET, COATED ORAL at 11:05

## 2025-04-19 RX ADMIN — Medication 125 MCG: at 11:05

## 2025-04-19 RX ADMIN — FLUTICASONE PROPIONATE 2 SPRAY: 50 SPRAY, METERED NASAL at 11:22

## 2025-04-19 ASSESSMENT — ACTIVITIES OF DAILY LIVING (ADL)
ADLS_ACUITY_SCORE: 68
ADLS_ACUITY_SCORE: 68
ADLS_ACUITY_SCORE: 65
ADLS_ACUITY_SCORE: 68
ADLS_ACUITY_SCORE: 65
ADLS_ACUITY_SCORE: 68
ADLS_ACUITY_SCORE: 65
ADLS_ACUITY_SCORE: 68
ADLS_ACUITY_SCORE: 68
ADLS_ACUITY_SCORE: 65
ADLS_ACUITY_SCORE: 68
ADLS_ACUITY_SCORE: 68

## 2025-04-19 NOTE — PROGRESS NOTES
Sudden tachy HR 140s to 170. Asymptomatic, tele Afib w/RVR. Converted to SR with PVC after given prn metoprolol 2.5 mg iv. Will continue to monitor.

## 2025-04-19 NOTE — PROGRESS NOTES
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    Orthopedic surgery followed, recommend nonoperative intervention.  On 4/17 p.m. patient A-fib with RVR, transferred to heart center -received IV metoprolol, IV Cardizem.    Suspect mechanical fall at care facility.    Physical deconditioning from medical illness, senile frailty  Acute nondisplaced to minimally displaced left pubic rami fractures.   Probable nondisplaced left sacral alar fracture.   Severe spinal canal [id number].  History of osteoporosis.  Patient resident of assisted living facility, independent and ambulates with walker.  -Per family report, patient received a phone call and was walking fast from a room to a different room to answer the call, might have tripped on the cord and fell.  Patient complained of left upper hip pain.  --CT head no intracranial hemorrhage, mass lesions, hydrocephalus or CT evidence for an acute infarct.    --CT C-spine no fracture or posttraumatic subluxation.  Severe spinal canal narrowing at C5-C6. Moderate to severe bilateral neuroforaminal narrowing at C5-C6.  -X-ray of the pelvis showed there is soft tissue mineralization along the lateral aspect of the left thigh and proximal femur.    --CT left hip without contrast  No evidence of any acute or displaced periprosthetic fracture involving the proximal femur.  Previously seen irregularity on x-ray was likely summation artifact.  Acute nondisplaced due to minimally displaced left-sided pubic rami  fractures seen.  Probable nondisplaced left sacral rober fracture.  Bones are markedly demineralized.  -- 4/18 - patient lying in bed. pain controlled.  Sitter by bedside.  - Orthopedic surgery followed, Non-operative management recommended from the orthopedic standpoint regarding pubic rami fractures, and left sacral ala. Appreciate comanagement.  -Weightbearing as tolerated right lower extremity.  -Scheduled Tylenol 3 times daily.  -Gabapentin 100 mg twice daily ordered.  -Lidocaine patch topical application.  -As needed Atarax every 6 hours as needed for adjuvant pain.  -As needed Robaxin as needed for muscle spasms  -Discontinue narcotics-family reports has done poorly with narcotics in the past and would not like to administer narcotics.  -Continue PTA vitamin D supplements.   -Hold PTA alendronate.   Rehab team evaluation.  Fall precautions  Ride arranged for Monday the 21st to [facility]CU   Follow up with specialist at [facility] in 6 weeks for repeat x-rays and reevaluation. Please call [phone] ([place]) or [phone] ([place]) to schedule.    Paroxysmal atrial fibrillation with RVR, now in sinus rhythm.  Elevated troponin likely from demand ischemia from fall.  On 4/17 A-fib with RVR, received IV Cardizem, IV metoprolol.   TSH, magnesium within normal limits  This morning in sinus rhythm, will start on metoprolol 12.5 mg twice daily with hold parameters.  Will initiate aspirin 81 mg oral daily, on subcutaneous Lovenox for DVT prophylaxis.  As needed IV metoprolol.  Telemetry monitoring.  Echocardiogram -> Left ventricular systolic function is normal.The visual ejection fraction is 60-65%. The right ventricular systolic function is mildly reduced. There is mild (1+) mitral regurgitation.There is moderate (2+) tricuspid regurgitation.There is mild (1+) aortic regurgitation. Pulmonary hypertension- RVSP 48 mm hg +RA.  Cardiology consultation -> Recommend very low-dose beta-blocker, baby aspirin  daily and no anticoagulation. No ischemic work up warranted.    Acute hypoxic respiratory failure likely from community-acquired pneumonia, underlying chronic lung infection/bronchiectasis.  Possible pneumonia community-acquired versus aspiration.  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  Dysphagia  Per family report has had low oxygen levels in the past, not on oxygen at baseline.  In ED at the time of evaluation afebrile, O2 sats 89% on 4 L, with 6 L improved to 94%.  WBC 11.9, lactic acid 0.6, procalcitonin 0.10.  pH 7.37, pCO2 51.  Troponin high-sensitivity 21 >21, N-terminal proBNP 218.  CT chest showed no PE or acute traumatic findings in the chest. Findings suggestive of airway infection/inflammation as described, which can be seen in the setting of Mycobacterium AVM intracellular. Scattered mucous plugging and airway centric nodular opacities and tree-in-bud. No lobar consolidation. Unchanged biapical scarring. No pleural effusion or pneumothorax.   -- Continues to require supplemental nasal oxygen, 3 L this morning  Continue intravenous ceftriaxone, azithromycin.  [4/15].  Blood cultures no growth to date.  Follow clinical improvement and accordingly de-escalate antibiotics. Trend WBC count, fever curve.  Wean off supplemental nasal oxygen.  Minimize narcotic use.  I-S, Acapella use.  Subcutaneous Lovenox for DVT prophylaxis.  Speech therapy following.  Aspiration precautions.    Acute encephalopathy likely multifactorial from pain/hospitalization/narcotic use.  Baseline dementia.  History of anxiety, depression.  At baseline dementia, forgetful.  Per nursing report intermittent impulsive, attempting to pull oxy mask.  As needed sitter by bedside.  Delirium precautions.  Avoid narcotics.  Continue PTA Zoloft.  As needed Seroquel ordered    History of polyarteritis nodosa biopsy-proven;  History of neuropathy secondary to  above;  Continue Imuran and gabapentin    Goals of care.  Discussed goals of care with patient, son and daughter on 4/18-continue restorative care -including diagnostic/therapeutic management.  DNR, DNI.    Clinically Significant Risk Factors        Orders Placed This Encounter      Combination Diet Easy to Chew (level 7); Thin Liquids (level 0) (Eat only when alert and upright, close assistance for positioning and set up.  Straw ok with single sips at a time.)      DVT Prophylaxis: SCDs, subcutaneous Lovenox  Code Status: No CPR- Do NOT Intubate  Disposition: Expected discharge in 2 days pending clinical improvement -decreased oxygen requirements, cardiac workup, safe discharge plan in place.    Medically Ready for Discharge: Anticipated in 2-4 Days     Discussed with patient, bedside RN, care team.  Updated patient's son, daughter by the bedside 4/19  More than 70% of time spent in direct patient care, care coordination, patient counseling, and formalizing plan of care.      Medical Decision Making       -------------------------- BILLING ON TIME ------------------------------------------------------------------------------------------------------------  35 MINUTES SPENT BY ME on the date of service doing chart review, history, exam, documentation & further activities per the note.              Lane Ramsey MD        Interval History:        Patient lying in bed.  This morning calm and cooperative  O2 needs stable  Pain controlled  Denies any pain.  No CP/SOB  No fevers or chills  No nausea / vomiting  No new complaints today  Family at bedside, questions answered         Physical Exam:        Physical Exam   Temp:  [97  F (36.1  C)-99.3  F (37.4  C)] 97.4  F (36.3  C)  Pulse:  [] 79  Resp:  [16-18] 18  BP: ()/(55-81) 100/55  SpO2:  [89 %-100 %] 96 %    PHYSICAL EXAM  GENERAL: Patient is in no distress.  Awake  HEART: Regular rate and rhythm. S1S2.  Systolic murmur present.  LUNGS: Slightly decreased  breath sounds, no wheezing or crackles  Respirations unlabored  ABDOMEN: Soft, no abdominal tenderness, bowel sounds heard   NEURO: Moving all extremities  EXTREMITIES: No pedal edema.  SKIN: Warm, dry. + bruising.  PSYCHIATRY Cooperative       Medications:        Current Facility-Administered Medications   Medication Dose Route Frequency Provider Last Rate Last Admin    acetaminophen (TYLENOL) tablet 975 mg  975 mg Oral Q8H Sharona Pearl MD   975 mg at 04/19/25 1105    aspirin EC tablet 81 mg  81 mg Oral Daily Tone Zarco MD   81 mg at 04/19/25 1105    azaTHIOprine (IMURAN) tablet 50 mg  50 mg Oral Daily Sharona Pearl MD   50 mg at 04/19/25 1105    azithromycin (ZITHROMAX) tablet 250 mg  250 mg Oral Daily Sharona Pearl MD   250 mg at 04/18/25 1736    calcium carbonate 500 mg (elemental) (OSCAL) tablet 500 mg  500 mg Oral Daily Sharona Pearl MD   500 mg at 04/19/25 1105    cefTRIAXone (ROCEPHIN) 2 g vial to attach to  ml bag for ADULTS or NS 50 ml bag for PEDS  2 g Intravenous Q24H Sharona Pearl MD   2 g at 04/18/25 1736    enoxaparin ANTICOAGULANT (LOVENOX) injection 40 mg  40 mg Subcutaneous Q24H Tone Zarco MD   40 mg at 04/18/25 1736    fluticasone (FLONASE) 50 MCG/ACT spray 2 spray  2 spray Both Nostrils Daily Sharona Pearl MD   2 spray at 04/19/25 1122    fluticasone-vilanterol (BREO ELLIPTA) 200-25 MCG/ACT inhaler 1 puff  1 puff Inhalation Daily Sharona Pearl MD   1 puff at 04/19/25 1122    gabapentin (NEURONTIN) capsule 100 mg  100 mg Oral BID Tone Zarco MD   100 mg at 04/19/25 1105    ipratropium (ATROVENT) 0.06 % spray 2 spray  2 spray Both Nostrils BID Sharona Pearl MD   2 spray at 04/19/25 1122    levothyroxine (SYNTHROID/LEVOTHROID) tablet 75 mcg  75 mcg Oral Daily Sharona Pearl MD   75 mcg at 04/19/25 1105    Lidocaine (LIDOCARE) 4 % Patch 1 patch  1 patch Transdermal Q24H Tone Zarco MD   1 patch at 04/19/25 1123    metoprolol tartrate (LOPRESSOR) half-tab  12.5 mg  12.5 mg Oral BID Tone Zarco MD   12.5 mg at 04/19/25 1105    senna-docusate (SENOKOT-S/PERICOLACE) 8.6-50 MG per tablet 2 tablet  2 tablet Oral BID Tone Zarco MD   2 tablet at 04/19/25 1105    sertraline (ZOLOFT) tablet 25 mg  25 mg Oral At Bedtime Sharona Pearl MD   25 mg at 04/18/25 2138    sodium chloride (PF) 0.9% PF flush 3 mL  3 mL Intracatheter Q8H [facility] Sky Fox, NP   3 mL at 04/18/25 2254    sodium phosphate 9 mmol in 250 mL NS intermittent infusion  9 mmol Intravenous Once Lane Ramsey MD   9 mmol at 04/19/25 1127    Vitamin D3 (CHOLECALCIFEROL) tablet 125 mcg  125 mcg Oral Daily Sharona Pearl MD   125 mcg at 04/19/25 1105     Current Facility-Administered Medications   Medication Dose Route Frequency Provider Last Rate Last Admin    bisacodyl (DULCOLAX) suppository 10 mg  10 mg Rectal Daily PRN Sharona Pearl MD        bismuth subsalicylate (PEPTO BISMOL) chewable tablet 524 mg  2 tablet Oral Q1H PRN Sharona Pearl MD        calcium carbonate (TUMS) chewable tablet 1,000 mg  1,000 mg Oral 4x Daily PRN Sharona Pearl MD        hydrOXYzine HCl (ATARAX) tablet 10 mg  10 mg Oral TID PRN Tone Zarco MD        lidocaine (LMX4) cream   Topical Q1H PRN Sky Fox NP        lidocaine 1 % 0.1-1 mL  0.1-1 mL Other Q1H PRN Sky Fox NP        melatonin tablet 5 mg  5 mg Oral At Bedtime PRN Rodriguez Mercedes APRN CNP        methocarbamol (ROBAXIN) tablet 500 mg  500 mg Oral Q8H PRN Tone Zarco MD        metoprolol (LOPRESSOR) injection 2.5 mg  2.5 mg Intravenous Q4H PRN Tone Zarco MD   2.5 mg at 04/18/25 2200    naloxone (NARCAN) injection 0.2 mg  0.2 mg Intravenous Q2 Min PRN Sharona Pearl MD        Or    naloxone (NARCAN) injection 0.4 mg  0.4 mg Intravenous Q2 Min PRN Sharona Pearl MD        Or    naloxone (NARCAN) injection 0.2 mg  0.2 mg Intramuscular Q2 Min PRN Sharona Pearl MD        Or    naloxone (NARCAN)  injection 0.4 mg  0.4 mg Intramuscular Q2 Min PRN Sharona Pearl MD        ondansetron (ZOFRAN ODT) ODT tab 4 mg  4 mg Oral Q6H PRN Sharona Pearl MD        Or    ondansetron (ZOFRAN) injection 4 mg  4 mg Intravenous Q6H PRN Sharona Pearl MD        polyethylene glycol (MIRALAX) Packet 17 g  17 g Oral BID PRN Sharona Pearl MD        prochlorperazine (COMPAZINE) injection 5 mg  5 mg Intravenous Q6H PRN Sharona Pearl MD        Or    prochlorperazine (COMPAZINE) tablet 5 mg  5 mg Oral Q6H PRN Sharona Pearl MD        QUEtiapine (SEROquel) half-tab 12.5 mg  12.5 mg Oral BID PRN Tone Zarco MD        sodium chloride (PF) 0.9% PF flush 3 mL  3 mL Intracatheter q1 min prn Sky Fox NP                Data:      All new lab and imaging data was reviewed.

## 2025-04-19 NOTE — PROGRESS NOTES
Care Management Follow Up    Length of Stay (days): 4    Expected Discharge Date: 04/21/2025     Concerns to be Addressed: discharge planning     Patient plan of care discussed at interdisciplinary rounds: Yes    Anticipated Discharge Disposition: Transitional Care              Anticipated Discharge Services:    Anticipated Discharge DME:      Patient/family educated on Medicare website which has current facility and service quality ratings: yes  Education Provided on the Discharge Plan:    Patient/Family in Agreement with the Plan: yes    Referrals Placed by CM/SW:  post acute facilities   Private pay costs discussed: Transportation costs    Discussed  Partnership in Safe Discharge Planning  document with patient/family: Yes-Verbally     Handoff Completed: No, handoff not indicated or clinically appropriate    Additional Information:  Writer called [facility] and spoke to Eveline. Ride arranged for Monday the 21st to [place]. Please see below. Writer called Melisa at [facility] and updated her on ride time for Monday. She is in agreement. Ride can be cancelled if patient is not ready on Monday. PCS form completed    [facility] (Ph: [phone])  Transport Type:[facility]  Indication/Need: pt is confused and oxygen    Ride Date: 4/21/25 Window Scheduled: [id number]   Private Pay Cost Discussed: yes  PCS Completed:Yes        Next Steps: Continue to follow      MYKE Mueller, LICSW  Social Work- Inpatient Care Coordination  [facility]

## 2025-04-20 LAB
BACTERIA BLD CULT: NO GROWTH
MAGNESIUM SERPL-MCNC: 1.9 MG/DL (ref 1.7–2.3)
PHOSPHATE SERPL-MCNC: 2.4 MG/DL (ref 2.5–4.5)
POTASSIUM SERPL-SCNC: 3.8 MMOL/L (ref 3.4–5.3)

## 2025-04-20 PROCEDURE — 250N000013 HC RX MED GY IP 250 OP 250 PS 637: Performed by: STUDENT IN AN ORGANIZED HEALTH CARE EDUCATION/TRAINING PROGRAM

## 2025-04-20 PROCEDURE — 84132 ASSAY OF SERUM POTASSIUM: CPT | Performed by: STUDENT IN AN ORGANIZED HEALTH CARE EDUCATION/TRAINING PROGRAM

## 2025-04-20 PROCEDURE — 250N000013 HC RX MED GY IP 250 OP 250 PS 637: Performed by: INTERNAL MEDICINE

## 2025-04-20 PROCEDURE — 99207 PR NO BILLABLE SERVICE THIS VISIT: CPT | Performed by: STUDENT IN AN ORGANIZED HEALTH CARE EDUCATION/TRAINING PROGRAM

## 2025-04-20 PROCEDURE — 250N000011 HC RX IP 250 OP 636: Performed by: STUDENT IN AN ORGANIZED HEALTH CARE EDUCATION/TRAINING PROGRAM

## 2025-04-20 PROCEDURE — 84100 ASSAY OF PHOSPHORUS: CPT | Performed by: STUDENT IN AN ORGANIZED HEALTH CARE EDUCATION/TRAINING PROGRAM

## 2025-04-20 PROCEDURE — 258N000003 HC RX IP 258 OP 636: Performed by: STUDENT IN AN ORGANIZED HEALTH CARE EDUCATION/TRAINING PROGRAM

## 2025-04-20 PROCEDURE — 250N000013 HC RX MED GY IP 250 OP 250 PS 637: Performed by: HOSPITALIST

## 2025-04-20 PROCEDURE — 250N000011 HC RX IP 250 OP 636: Performed by: HOSPITALIST

## 2025-04-20 PROCEDURE — 250N000012 HC RX MED GY IP 250 OP 636 PS 637: Performed by: INTERNAL MEDICINE

## 2025-04-20 PROCEDURE — 83735 ASSAY OF MAGNESIUM: CPT | Performed by: STUDENT IN AN ORGANIZED HEALTH CARE EDUCATION/TRAINING PROGRAM

## 2025-04-20 PROCEDURE — 120N000001 HC R&B MED SURG/OB

## 2025-04-20 PROCEDURE — 36415 COLL VENOUS BLD VENIPUNCTURE: CPT | Performed by: STUDENT IN AN ORGANIZED HEALTH CARE EDUCATION/TRAINING PROGRAM

## 2025-04-20 PROCEDURE — 99232 SBSQ HOSP IP/OBS MODERATE 35: CPT | Performed by: STUDENT IN AN ORGANIZED HEALTH CARE EDUCATION/TRAINING PROGRAM

## 2025-04-20 PROCEDURE — 250N000009 HC RX 250: Performed by: STUDENT IN AN ORGANIZED HEALTH CARE EDUCATION/TRAINING PROGRAM

## 2025-04-20 PROCEDURE — 250N000011 HC RX IP 250 OP 636: Performed by: INTERNAL MEDICINE

## 2025-04-20 RX ORDER — MAGNESIUM SULFATE HEPTAHYDRATE 40 MG/ML
2 INJECTION, SOLUTION INTRAVENOUS ONCE
Status: COMPLETED | OUTPATIENT
Start: 2025-04-20 | End: 2025-04-20

## 2025-04-20 RX ORDER — POTASSIUM CHLORIDE 20MEQ/15ML
20 LIQUID (ML) ORAL ONCE
Status: COMPLETED | OUTPATIENT
Start: 2025-04-20 | End: 2025-04-20

## 2025-04-20 RX ADMIN — LEVOTHYROXINE SODIUM 75 MCG: 75 TABLET ORAL at 09:25

## 2025-04-20 RX ADMIN — FLUTICASONE PROPIONATE 2 SPRAY: 50 SPRAY, METERED NASAL at 09:07

## 2025-04-20 RX ADMIN — CEFTRIAXONE SODIUM 2 G: 2 INJECTION, POWDER, FOR SOLUTION INTRAMUSCULAR; INTRAVENOUS at 18:02

## 2025-04-20 RX ADMIN — ASPIRIN 81 MG: 81 TABLET, COATED ORAL at 09:24

## 2025-04-20 RX ADMIN — AZATHIOPRINE 50 MG: 50 TABLET ORAL at 09:24

## 2025-04-20 RX ADMIN — METOPROLOL TARTRATE 12.5 MG: 25 TABLET, FILM COATED ORAL at 21:26

## 2025-04-20 RX ADMIN — IPRATROPIUM BROMIDE 2 SPRAY: 42 SPRAY NASAL at 21:30

## 2025-04-20 RX ADMIN — SENNOSIDES AND DOCUSATE SODIUM 2 TABLET: 50; 8.6 TABLET ORAL at 21:25

## 2025-04-20 RX ADMIN — SODIUM PHOSPHATE, MONOBASIC, MONOHYDRATE AND SODIUM PHOSPHATE, DIBASIC, ANHYDROUS 9 MMOL: 142; 276 INJECTION, SOLUTION INTRAVENOUS at 11:23

## 2025-04-20 RX ADMIN — SENNOSIDES AND DOCUSATE SODIUM 2 TABLET: 50; 8.6 TABLET ORAL at 09:24

## 2025-04-20 RX ADMIN — Medication 125 MCG: at 09:24

## 2025-04-20 RX ADMIN — ACETAMINOPHEN 975 MG: 325 TABLET, FILM COATED ORAL at 03:22

## 2025-04-20 RX ADMIN — CALCIUM 500 MG: 500 TABLET ORAL at 09:24

## 2025-04-20 RX ADMIN — MAGNESIUM SULFATE HEPTAHYDRATE 2 G: 40 INJECTION, SOLUTION INTRAVENOUS at 09:18

## 2025-04-20 RX ADMIN — IPRATROPIUM BROMIDE 2 SPRAY: 42 SPRAY NASAL at 09:07

## 2025-04-20 RX ADMIN — ENOXAPARIN SODIUM 40 MG: 40 INJECTION SUBCUTANEOUS at 18:02

## 2025-04-20 RX ADMIN — FLUTICASONE FUROATE AND VILANTEROL TRIFENATATE 1 PUFF: 200; 25 POWDER RESPIRATORY (INHALATION) at 09:07

## 2025-04-20 RX ADMIN — METOPROLOL TARTRATE 12.5 MG: 25 TABLET, FILM COATED ORAL at 09:24

## 2025-04-20 RX ADMIN — SERTRALINE HYDROCHLORIDE 25 MG: 25 TABLET ORAL at 21:26

## 2025-04-20 RX ADMIN — GABAPENTIN 100 MG: 100 CAPSULE ORAL at 09:24

## 2025-04-20 RX ADMIN — LIDOCAINE 1 PATCH: 4 PATCH TOPICAL at 09:19

## 2025-04-20 RX ADMIN — ACETAMINOPHEN 975 MG: 325 TABLET, FILM COATED ORAL at 18:02

## 2025-04-20 RX ADMIN — QUETIAPINE 12.5 MG: 25 TABLET, FILM COATED ORAL at 21:25

## 2025-04-20 RX ADMIN — POTASSIUM CHLORIDE 20 MEQ: 20 SOLUTION ORAL at 09:11

## 2025-04-20 RX ADMIN — GABAPENTIN 100 MG: 100 CAPSULE ORAL at 21:25

## 2025-04-20 RX ADMIN — ACETAMINOPHEN 975 MG: 325 TABLET, FILM COATED ORAL at 10:27

## 2025-04-20 ASSESSMENT — ACTIVITIES OF DAILY LIVING (ADL)
ADLS_ACUITY_SCORE: 65
ADLS_ACUITY_SCORE: 65
ADLS_ACUITY_SCORE: 60
ADLS_ACUITY_SCORE: 56
ADLS_ACUITY_SCORE: 56
ADLS_ACUITY_SCORE: 65
ADLS_ACUITY_SCORE: 65
ADLS_ACUITY_SCORE: 56
ADLS_ACUITY_SCORE: 60
ADLS_ACUITY_SCORE: 65
ADLS_ACUITY_SCORE: 56
ADLS_ACUITY_SCORE: 60
ADLS_ACUITY_SCORE: 65
ADLS_ACUITY_SCORE: 56
ADLS_ACUITY_SCORE: 65
ADLS_ACUITY_SCORE: 59
ADLS_ACUITY_SCORE: 65

## 2025-04-20 NOTE — PROGRESS NOTES
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    Orthopedic surgery followed, recommend nonoperative intervention.  On 4/17 p.m. patient A-fib with RVR, transferred to heart center -received IV metoprolol, IV Cardizem.    Suspect mechanical fall at care facility.    Physical deconditioning from medical illness, senile frailty  Acute nondisplaced to minimally displaced left pubic rami fractures.   Probable nondisplaced left sacral alar fracture.   Severe spinal canal wlqurefim-L0-V1.  History of osteoporosis.  Patient resident of assisted living facility, independent and ambulates with walker.  -Per family report, patient received a phone call and was walking fast from a room to a different room to answer the call, might have tripped on the cord and fell.  Patient complained of left upper hip pain.  --CT head no intracranial hemorrhage, mass lesions, hydrocephalus or CT evidence for an acute infarct.    --CT C-spine no fracture or posttraumatic subluxation.  Severe spinal canal narrowing at C5-C6. Moderate to severe bilateral neuroforaminal narrowing at C5-C6.  -X-ray of the pelvis showed there is soft tissue mineralization along the lateral aspect of the left thigh and proximal femur.    --CT left hip without contrast  No evidence of any acute or displaced periprosthetic fracture involving the proximal femur.  Previously seen irregularity on x-ray was likely summation artifact.  Acute nondisplaced due to minimally displaced left-sided pubic rami  fractures seen.  Probable nondisplaced left sacral rober fracture.  Bones are markedly demineralized.  -- 4/18 - patient lying in bed. pain controlled.  Sitter by bedside.  - Orthopedic surgery followed, Non-operative management recommended from the orthopedic standpoint regarding pubic rami fractures, and left sacral ala. Appreciate comanagement.  -Weightbearing as tolerated right lower extremity.  -Scheduled Tylenol 3 times daily.  -Gabapentin 100 mg twice daily ordered.  -Lidocaine patch topical application.  -As needed Atarax every 6 hours as needed for adjuvant pain.  -As needed Robaxin as needed for muscle spasms  -Discontinue narcotics-family reports has done poorly with narcotics in the past and would not like to administer narcotics.  -Continue PTA vitamin D supplements.   -Hold PTA alendronate.   Rehab team evaluation.  Fall precautions  Ride arranged for Monday the 21st to TCU   Follow up with specialist at [facility] in 6 weeks for repeat x-rays and reevaluation. Please call [phone] ([place]) or [phone] ([place]) to schedule.    Paroxysmal atrial fibrillation with RVR, now in sinus rhythm.  Elevated troponin likely from demand ischemia from fall.  On 4/17 A-fib with RVR, received IV Cardizem, IV metoprolol.   TSH, magnesium within normal limits  This morning in sinus rhythm, will start on metoprolol 12.5 mg twice daily with hold parameters.  Will initiate aspirin 81 mg oral daily, on subcutaneous Lovenox for DVT prophylaxis.  As needed IV metoprolol.  Telemetry monitoring.  Echocardiogram -> Left ventricular systolic function is normal.The visual ejection fraction is 60-65%. The right ventricular systolic function is mildly reduced. There is mild (1+) mitral regurgitation.There is moderate (2+) tricuspid regurgitation.There is mild (1+) aortic regurgitation. Pulmonary hypertension- RVSP 48 mm hg +RA.  Cardiology consultation -> Recommend very low-dose beta-blocker, baby aspirin  daily and no anticoagulation. No ischemic work up warranted.    Acute hypoxic respiratory failure likely from community-acquired pneumonia, underlying chronic lung infection/bronchiectasis.  Possible pneumonia community-acquired versus aspiration.  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  Dysphagia  Per family report has had low oxygen levels in the past, not on oxygen at baseline.  In ED at the time of evaluation afebrile, O2 sats 89% on 4 L, with 6 L improved to 94%.  WBC 11.9, lactic acid 0.6, procalcitonin 0.10.  pH 7.37, pCO2 51.  Troponin high-sensitivity 21 >21, N-terminal proBNP 218.  CT chest showed no PE or acute traumatic findings in the chest. Findings suggestive of airway infection/inflammation as described, which can be seen in the setting of Mycobacterium AVM intracellular. Scattered mucous plugging and airway centric nodular opacities and tree-in-bud. No lobar consolidation. Unchanged biapical scarring. No pleural effusion or pneumothorax.   -- Continues to require supplemental nasal oxygen, 3 L this morning  Continue intravenous ceftriaxone, azithromycin.  [4/15].  Blood cultures no growth to date.  Follow clinical improvement and accordingly de-escalate antibiotics. Trend WBC count, fever curve.  Wean off supplemental nasal oxygen.  Minimize narcotic use.  I-S, Acapella use.  Subcutaneous Lovenox for DVT prophylaxis.  Speech therapy following.  Aspiration precautions.    Acute encephalopathy likely multifactorial from pain/hospitalization/narcotic use.  Baseline dementia.  History of anxiety, depression.  At baseline dementia, forgetful.  Per nursing report intermittent impulsive, attempting to pull oxy mask.  As needed sitter by bedside.  Delirium precautions.  Avoid narcotics.  Continue PTA Zoloft.  As needed Seroquel ordered    History of polyarteritis nodosa biopsy-proven;  History of neuropathy secondary to  above;  Continue Imuran and gabapentin    Goals of care.  Discussed goals of care with patient, son and daughter on 4/18-continue restorative care -including diagnostic/therapeutic management.  DNR, DNI.    Clinically Significant Risk Factors        Orders Placed This Encounter      Combination Diet Easy to Chew (level 7); Thin Liquids (level 0) (Eat only when alert and upright, close assistance for positioning and set up.  Straw ok with single sips at a time.)      DVT Prophylaxis: SCDs, subcutaneous Lovenox  Code Status: No CPR- Do NOT Intubate  Disposition: Expected discharge in 2 days pending clinical improvement -decreased oxygen requirements, cardiac workup, safe discharge plan in place.    Medically Ready for Discharge: Anticipated Tomorrow     Discussed with patient, bedside RN, care team.  Updated patient's son by the bedside 4/20  More than 70% of time spent in direct patient care, care coordination, patient counseling, and formalizing plan of care.      Medical Decision Making       -------------------------- BILLING ON TIME ------------------------------------------------------------------------------------------------------------  40 MINUTES SPENT BY ME on the date of service doing chart review, history, exam, documentation & further activities per the note.              Lane Ramsey MD        Interval History:        Patient lying in bed.  This morning calm and cooperative but more confused today  O2 needs stable  Pain controlled  Denies any pain.  No CP/SOB  No fevers or chills  No nausea / vomiting  No new complaints today  Family at bedside, questions answered         Physical Exam:        Physical Exam   Temp:  [97  F (36.1  C)-97.6  F (36.4  C)] 97.3  F (36.3  C)  Pulse:  [77-84] 78  Resp:  [18-22] 22  BP: (108-130)/(64-79) 125/72  SpO2:  [92 %-100 %] 94 %    PHYSICAL EXAM  GENERAL: Patient is in no distress.  Awake  HEART: Regular rate and rhythm. S1S2.  Systolic murmur present.  LUNGS: Slightly  decreased breath sounds, no wheezing or crackles. Respirations unlabored  ABDOMEN: Soft, no abdominal tenderness, bowel sounds heard   NEURO: Moving all extremities  EXTREMITIES: No pedal edema.  SKIN: Warm, dry. + bruising.  PSYCHIATRY Cooperative       Medications:        Current Facility-Administered Medications   Medication Dose Route Frequency Provider Last Rate Last Admin    acetaminophen (TYLENOL) tablet 975 mg  975 mg Oral Q8H Sharona Pearl MD   975 mg at 04/20/25 1027    aspirin EC tablet 81 mg  81 mg Oral Daily Tone Zarco MD   81 mg at 04/20/25 0924    azaTHIOprine (IMURAN) tablet 50 mg  50 mg Oral Daily Sharona Pearl MD   50 mg at 04/20/25 0924    calcium carbonate 500 mg (elemental) (OSCAL) tablet 500 mg  500 mg Oral Daily Sharona Pearl MD   500 mg at 04/20/25 0924    cefTRIAXone (ROCEPHIN) 2 g vial to attach to  ml bag for ADULTS or NS 50 ml bag for PEDS  2 g Intravenous Q24H Sharona Pearl MD   2 g at 04/19/25 1758    enoxaparin ANTICOAGULANT (LOVENOX) injection 40 mg  40 mg Subcutaneous Q24H Tone Zarco MD   40 mg at 04/19/25 1759    fluticasone (FLONASE) 50 MCG/ACT spray 2 spray  2 spray Both Nostrils Daily Sharona Pearl MD   2 spray at 04/20/25 0907    fluticasone-vilanterol (BREO ELLIPTA) 200-25 MCG/ACT inhaler 1 puff  1 puff Inhalation Daily Sharona Pearl MD   1 puff at 04/20/25 0907    gabapentin (NEURONTIN) capsule 100 mg  100 mg Oral BID Tone Zarco MD   100 mg at 04/20/25 0924    ipratropium (ATROVENT) 0.06 % spray 2 spray  2 spray Both Nostrils BID Sharona Pearl MD   2 spray at 04/20/25 0907    levothyroxine (SYNTHROID/LEVOTHROID) tablet 75 mcg  75 mcg Oral Daily Sharona Pearl MD   75 mcg at 04/20/25 0925    Lidocaine (LIDOCARE) 4 % Patch 1 patch  1 patch Transdermal Q24H Tone Zarco MD   1 patch at 04/20/25 0919    metoprolol tartrate (LOPRESSOR) half-tab 12.5 mg  12.5 mg Oral BID Tone Zarco MD   12.5 mg at 04/20/25 0924    senna-docusate  (SENOKOT-S/PERICOLACE) 8.6-50 MG per tablet 2 tablet  2 tablet Oral BID Tone Zarco MD   2 tablet at 04/20/25 0924    sertraline (ZOLOFT) tablet 25 mg  25 mg Oral At Bedtime Sharona Pearl MD   25 mg at 04/19/25 2011    sodium chloride (PF) 0.9% PF flush 3 mL  3 mL Intracatheter Q8H [facility] Sky Fox, NP   3 mL at 04/20/25 0652    Vitamin D3 (CHOLECALCIFEROL) tablet 125 mcg  125 mcg Oral Daily Sharona Pearl MD   125 mcg at 04/20/25 0924     Current Facility-Administered Medications   Medication Dose Route Frequency Provider Last Rate Last Admin    bisacodyl (DULCOLAX) suppository 10 mg  10 mg Rectal Daily PRN Sharona Pearl MD        bismuth subsalicylate (PEPTO BISMOL) chewable tablet 524 mg  2 tablet Oral Q1H PRN Sharona Pearl MD        calcium carbonate (TUMS) chewable tablet 1,000 mg  1,000 mg Oral 4x Daily PRN Sharona Pearl MD        hydrOXYzine HCl (ATARAX) tablet 10 mg  10 mg Oral TID PRN Tone Zarco MD        lidocaine (LMX4) cream   Topical Q1H PRN Sky Fox NP        lidocaine 1 % 0.1-1 mL  0.1-1 mL Other Q1H PRN Sky Fox NP        melatonin tablet 5 mg  5 mg Oral At Bedtime PRN Rodriguez Mercedes APRN CNP        methocarbamol (ROBAXIN) tablet 500 mg  500 mg Oral Q8H PRN Tone Zarco MD        metoprolol (LOPRESSOR) injection 2.5 mg  2.5 mg Intravenous Q4H PRN Tone Zarco MD   2.5 mg at 04/18/25 2200    naloxone (NARCAN) injection 0.2 mg  0.2 mg Intravenous Q2 Min PRN Sharona Pearl MD        Or    naloxone (NARCAN) injection 0.4 mg  0.4 mg Intravenous Q2 Min PRN Sharona Pearl MD        Or    naloxone (NARCAN) injection 0.2 mg  0.2 mg Intramuscular Q2 Min PRN Sharona Pearl MD        Or    naloxone (NARCAN) injection 0.4 mg  0.4 mg Intramuscular Q2 Min PRN Sharona Pearl MD        ondansetron (ZOFRAN ODT) ODT tab 4 mg  4 mg Oral Q6H PRN Sharona Pearl MD        Or    ondansetron (ZOFRAN) injection 4 mg  4 mg Intravenous Q6H  PRN Sharona Pearl MD        polyethylene glycol (MIRALAX) Packet 17 g  17 g Oral BID PRN Sharona Pearl MD        prochlorperazine (COMPAZINE) injection 5 mg  5 mg Intravenous Q6H PRN Sharona Pearl MD        Or    prochlorperazine (COMPAZINE) tablet 5 mg  5 mg Oral Q6H PRN Sharona Pearl MD        QUEtiapine (SEROquel) half-tab 12.5 mg  12.5 mg Oral BID PRN Tone Zarco MD        sodium chloride (PF) 0.9% PF flush 3 mL  3 mL Intracatheter q1 min prn Sky Fox NP                Data:      All new lab and imaging data was reviewed.

## 2025-04-20 NOTE — PROGRESS NOTES
Patient transferred to Ortho. Report given to RN. Family called and informed of room change. Dr. Ramsey paged for tramadol. Patient in severe pain when shifted or moved. Alert to self and more difficult to redirect versus yesterday. Patient refused to get out of bed today related to pain.

## 2025-04-20 NOTE — PROGRESS NOTES
Care Management Follow Up    Length of Stay (days): 5    Expected Discharge Date: 04/21/2025     Concerns to be Addressed: discharge planning     Patient plan of care discussed at interdisciplinary rounds: Yes    Anticipated Discharge Disposition: Transitional Care              Anticipated Discharge Services:    Anticipated Discharge DME:      Patient/family educated on Medicare website which has current facility and service quality ratings: yes  Education Provided on the Discharge Plan:    Patient/Family in Agreement with the Plan: yes    Referrals Placed by CM/SW:  Post acute facilities   Private pay costs discussed: Not applicable    Discussed  Partnership in Safe Discharge Planning  document with patient/family: Yes:      Handoff Completed: No, handoff not indicated or clinically appropriate    Additional Information:  Call placed to Darlene and updated her on discharge time for tomorrow. Explained that after discussing with floor staff, a stretcher ride was arranged for patient for management of the O2/Safety/supervision. Daughter aware that patient's insurance will be billed for cost of the ride and If it is not approved then patient will receive a bill. Daughter aware and understanding regarding medical reason why for the stretcher. Confirmed we can update her in the morning to ensure that patient was cleared for discharge but did explained we have a ride arranged for tomorrow at [id number] to [facility]. They are in agreement with the plan.     Next Steps: Deangelo, MYKE Beauchamp, [facility] Mayo HospitalSW  Social Work- Inpatient Care Coordination  [facility]

## 2025-04-21 VITALS
OXYGEN SATURATION: 92 % | SYSTOLIC BLOOD PRESSURE: 123 MMHG | DIASTOLIC BLOOD PRESSURE: 70 MMHG | TEMPERATURE: 98 F | HEIGHT: 64 IN | RESPIRATION RATE: 16 BRPM | HEART RATE: 92 BPM | WEIGHT: 112.88 LBS | BODY MASS INDEX: 19.27 KG/M2

## 2025-04-21 LAB — BACTERIA BLD CULT: NO GROWTH

## 2025-04-21 PROCEDURE — 250N000013 HC RX MED GY IP 250 OP 250 PS 637: Performed by: INTERNAL MEDICINE

## 2025-04-21 PROCEDURE — 250N000013 HC RX MED GY IP 250 OP 250 PS 637: Performed by: HOSPITALIST

## 2025-04-21 PROCEDURE — 99239 HOSP IP/OBS DSCHRG MGMT >30: CPT | Performed by: STUDENT IN AN ORGANIZED HEALTH CARE EDUCATION/TRAINING PROGRAM

## 2025-04-21 PROCEDURE — 250N000012 HC RX MED GY IP 250 OP 636 PS 637: Performed by: INTERNAL MEDICINE

## 2025-04-21 RX ORDER — HYDROXYZINE HYDROCHLORIDE 10 MG/1
10 TABLET, FILM COATED ORAL 3 TIMES DAILY PRN
DISCHARGE
Start: 2025-04-21

## 2025-04-21 RX ORDER — POLYETHYLENE GLYCOL 3350 17 G/17G
17 POWDER, FOR SOLUTION ORAL DAILY
DISCHARGE
Start: 2025-04-21

## 2025-04-21 RX ORDER — METHOCARBAMOL 500 MG/1
500 TABLET, FILM COATED ORAL EVERY 8 HOURS PRN
DISCHARGE
Start: 2025-04-21

## 2025-04-21 RX ORDER — METOPROLOL TARTRATE 25 MG/1
12.5 TABLET, FILM COATED ORAL 2 TIMES DAILY
DISCHARGE
Start: 2025-04-21

## 2025-04-21 RX ORDER — DOXYCYCLINE 100 MG/1
100 CAPSULE ORAL 2 TIMES DAILY
DISCHARGE
Start: 2025-04-21 | End: 2025-04-26

## 2025-04-21 RX ORDER — CEFUROXIME AXETIL 500 MG/1
500 TABLET ORAL 2 TIMES DAILY
DISCHARGE
Start: 2025-04-21 | End: 2025-04-26

## 2025-04-21 RX ORDER — ASPIRIN 81 MG/1
81 TABLET, COATED ORAL DAILY
DISCHARGE
Start: 2025-04-22

## 2025-04-21 RX ORDER — GABAPENTIN 100 MG/1
100 CAPSULE ORAL 2 TIMES DAILY
DISCHARGE
Start: 2025-04-21

## 2025-04-21 RX ORDER — QUETIAPINE FUMARATE 25 MG/1
12.5 TABLET, FILM COATED ORAL 2 TIMES DAILY PRN
DISCHARGE
Start: 2025-04-21

## 2025-04-21 RX ORDER — ACETAMINOPHEN 325 MG/1
975 TABLET ORAL EVERY 8 HOURS
DISCHARGE
Start: 2025-04-21

## 2025-04-21 RX ORDER — LIDOCAINE 4 G/G
1 PATCH TOPICAL EVERY 24 HOURS
DISCHARGE
Start: 2025-04-21

## 2025-04-21 RX ADMIN — LIDOCAINE 1 PATCH: 4 PATCH TOPICAL at 08:28

## 2025-04-21 RX ADMIN — FLUTICASONE FUROATE AND VILANTEROL TRIFENATATE 1 PUFF: 200; 25 POWDER RESPIRATORY (INHALATION) at 08:28

## 2025-04-21 RX ADMIN — CALCIUM 500 MG: 500 TABLET ORAL at 08:27

## 2025-04-21 RX ADMIN — SENNOSIDES AND DOCUSATE SODIUM 2 TABLET: 50; 8.6 TABLET ORAL at 08:27

## 2025-04-21 RX ADMIN — Medication 125 MCG: at 08:27

## 2025-04-21 RX ADMIN — ACETAMINOPHEN 975 MG: 325 TABLET, FILM COATED ORAL at 09:49

## 2025-04-21 RX ADMIN — METOPROLOL TARTRATE 12.5 MG: 25 TABLET, FILM COATED ORAL at 08:27

## 2025-04-21 RX ADMIN — FLUTICASONE PROPIONATE 2 SPRAY: 50 SPRAY, METERED NASAL at 08:28

## 2025-04-21 RX ADMIN — IPRATROPIUM BROMIDE 2 SPRAY: 42 SPRAY NASAL at 08:28

## 2025-04-21 RX ADMIN — ASPIRIN 81 MG: 81 TABLET, COATED ORAL at 08:27

## 2025-04-21 RX ADMIN — GABAPENTIN 100 MG: 100 CAPSULE ORAL at 08:27

## 2025-04-21 RX ADMIN — LEVOTHYROXINE SODIUM 75 MCG: 75 TABLET ORAL at 08:27

## 2025-04-21 RX ADMIN — ACETAMINOPHEN 975 MG: 325 TABLET, FILM COATED ORAL at 02:44

## 2025-04-21 RX ADMIN — AZATHIOPRINE 50 MG: 50 TABLET ORAL at 08:27

## 2025-04-21 ASSESSMENT — ACTIVITIES OF DAILY LIVING (ADL)
ADLS_ACUITY_SCORE: 56
ADLS_ACUITY_SCORE: 63
ADLS_ACUITY_SCORE: 56
ADLS_ACUITY_SCORE: 63
ADLS_ACUITY_SCORE: 56

## 2025-04-21 NOTE — PROGRESS NOTES
Speech Language Therapy Discharge Summary    Reason for therapy discharge:    Discharged to home.  All goals and outcomes met, no further needs identified.    Progress towards therapy goal(s). See goals on Care Plan in [facility] electronic health record for goal details.  Goals met    Therapy recommendation(s):    No further therapy is recommended.Recommend continue easy to chew diet with thin liquids only when alert with 1:1 assistance and facilitated self feeding recommended at this time. Crush meds with puree now recommended.

## 2025-04-21 NOTE — PLAN OF CARE
A&O to self. Redirectable when O2 is pulled off at times. Tele: SR/ST with activity. 3-5L NC. No CP, dyspnea on exertion. Up with 2 and sharon steady. Incontinent bowel and bladder. Poor oral intake. Encouraging feeding. Up in chair for the morning and afternoon.   Plan: discharge Monday, confirming bed   
A&O x self. Patient denies pain while at rest but has significant pain with activity. VSS, on 3-5L NC, oxymask in place while at rest. Up with sharon steady Ax2 w/ gait belt, pt declined getting OOB this shift. Tele: SR. Phos and K replaced, recheck in AM. Scattered scabs and peeling, unchanged. Plan for discharge to TCU on Monday pending placement. Plan of care ongoing.    
Alert and oriented to self. Tele: SR/ST (90-110s). VSS. 4-5L NC, desats quickly. More aware and awake this afternoon. Poor UO, brown urine. Poor intake - started Ensures with meals and SLP saw patient for swallowing decline. Able to take pills crushed with apple sauce. Pain reported in pelvis with movement. PT seeing patient. Cards c/s for anticoagulation recommendations per Hospitalist. Family at bedside.   Plan: monitor HR and continue working with PT  
Date/Time: 04/16: [id number]    Trauma/Ortho/Medical (Choose one): Ortho    Diagnosis: (L) Sacral Ala Fx/(L) Pubic Rami Fx/CAP  POD#: N/A  Mental Status: Oriented to self only  Activity/dangle: Turn/repo in bed q 2 hrs and prn  Diet: Easy to chew and thin liquid  Pain: Hard to assess but did receive a dose of 0.2 mg IV dilaudid x 1 in AM due to c/o pain  Rivas/Voiding: External catheter with adequate output  Tele/Restraints/Iso: Tele is SR with frequent PVCs  02/LDA: Weaned O2 from 4L to 2L NC and is sating mid to high 90's. Is on intermittent IV abx  D/C Date: TBD  Other Info: Family wants nursing staff to be careful with administering narcotics. Pt was quite agitated/restless/removing O2 tubing when the shift started but is more calm and approachable towards the end of shift                        
Date: 04/21/25  Diagnosis: Displace left pubic rami fracture, non-surgical  Mental Status: Alert to self  Activity/dangle: Ast of 2 GB/ Ivone Steady  Diet: Mechanical soft diet w/ thin liquids  Pain: Managed with scheduled Tylenol  Rivas/Voiding: Incontinent B&B  Tele/Restraints/Iso: N/A  02/LDA: 2L O2 NC. PIV removed  Other Info: All belongings taken by family. Gave copy of AVS to pt's daughter. Discharge to TCU via stretcher ride.    
Goal Outcome Evaluation:                    Summary:4-23-25 [id number]  Diagnosis: L pubic fx, probable nondisplaced left sacral all fracture    Non surgical   Orientation: Alert x1  Vitals/Tele: VSS on 4l not baseline   IV Access/drains: IV saline lock   Diet: reg  Mobility: not oob yet   GI/: extraing  cath   Wound/Skin:  Consults::  Discharge Plan      See Flow sheets for assessment     
Goal Outcome Evaluation:       Date/Time 4/20/25     Trauma/Ortho/Medical (Choose one) trauma    Diagnosis: L pubic fx  Mental Status:1  Activity/dangle up with SBA of 2 sharon steady  Diet: Reg  Pain: Tramadol  Rivas/Voiding: pure wick  Tele/Restraints/Iso: tele  02/LDA: IVF  D/C Date: tomorrow TCU?                 
Goal Outcome Evaluation:       Shift Summary [id number]    Admitting Diagnosis: Mycobacterium avium-intracellulare infection (H) [A31.0]  Acute respiratory failure with hypoxia (H) [J96.01]  Pubic ramus fracture, left, closed, initial encounter (H) [S32.592A]  Periprosthetic fracture of hip, initial encounter [M97.8XXA, Z96.649]  Pneumonia of both lower lobes due to infectious organism [J18.9]  Closed fracture of sacrum, unspecified portion of sacrum, initial encounter (H) [S32.10XA]  Unwitnessed fall [R29.6]   Vitals VSS ex O2 on 3L  Pain managed by scheduled and PRN tylenol & dilaudid  Oriented to self only  Voiding purewick in place  Mobility Turn/repo in bed q 2 hrs  Tele SR with frequent PVCs  CMS Intact  GI No BM @ this time.  Becca on intermittent IV abx  Family wants nursing staff to be careful with administering narcotics. Sitter in place.  Orders Placed This Encounter      Combination Diet Easy to Chew (level 7); Thin Liquids (level 0) (Eat only when alert and upright, close assistance for positioning and set up.  Straw ok with single sips at a time.)       Plan: D\C TBD                                   
Goal Outcome Evaluation:      Plan of Care Reviewed With: patient    Overall Patient Progress: no changeOverall Patient Progress: no change    Oriented to self, pt pleasantly confused. VSS. Tele SR. Scheduled Tylenol for pain. IV SL. Not OOB. A2 t/r q2hrs, heels elevated.Pt refuses PCDs at times.  Assist to feed/drink, pt ate pudding and applesauce overnight. Purewick in place, voiding adequately. Discharge to TCU pending.             
Goal Outcome Evaluation:      Plan of Care Reviewed With: patient, child          Outcome Evaluation: Discharge to TCU when medically ready and have accepting facility.      
Goal Outcome Evaluation:  -~Care plan-end of shift note:    -~Orientation/Mentation:Disoriented to place time & situations, re-oriented, sitter at bed side, cooperative with redirection   -~VS:VSS on RA sat 90s   -~LS/Pulm:Ls diminished cracles   -~Tele/Cardiac:SR  -~GI:WDL ex incontinence   -~:WDL ex incontinence, external catheter in place   -~Pain:denies, declines po meds.    -~Mobility:up w/1A/GB/W  -~Skin:scattered bruises, sacrum wounds, L ankle skin tear   -~Diet:easy to chew (level 7) thin liquids  -~Lines/IVs:PIV SL  -~Safety/Concern:fall risk  -~Aggression color:green   -~Plan/Shift summary/Goals: K+ & phos replacement. Plans for SW consult & echocardiogram             
Goal Outcome Evaluation:  -~Care plan-end of shift note:   -~Orientation/Mentation:Disoriented to place time & situations, re-oriented, cooperative with redirection   -~VS:VSS on RA sat 90s   -~LS/Pulm:Ls diminished bibasilar fine crackles   -~Tele/Cardiac:SR->AfibRVR->SR w/PVC  -~GI:WDL ex incontinence   -~:WDL ex incontinence, external catheter in place   -~Pain:denies, offered pain meds, declines .    -~Mobility:up w/2A/GB/sharon steady  -~Skin:scattered bruises, sacrum wounds, L ankle skin tear   -~Diet:easy to chew (level 7) thin liquids  -~Lines/IVs:PIV SL  -~Safety/Concern:fall risk  -~Aggression color:green   -~Plan/Shift summary/Goals: Has remained in SR after a sudden burst of AfibRVR, converted  with prn metoprolol. Plans for possible TCU at discharge pending       
Shift: [id number]    Cognition/Mentation/Neuro: Only oriented to self. Calm, cooperative   VS: VSS on 4L oxymask  Cardiac: SR on tele, denies chest pain  GI/: Incontinent, purewick in place- minimal output. Bladder scanned for 168.   Pulmonary: LS diminished  Pain: Managed with scheduled tylenol.   Drains/Lines: SL PIV  Skin: Scattered scabs and bruises. Left hip bruise, skin tear to shin   Activity: A2, turn and repo while in bed   Diet: Easy to chew, thin liquids. Pills crushed in apple sauce   Discharge: Pending placement     Shift summary: Patient only able to tolerate 1 of 2 bags of potassium per replacement. Per provider okay to recheck in AM      
Transferred r/t RRT with afib RVR. Converted before arriving to floor. Tele: SR - occ bigeminal PVCs. Alert and oriented to self (baseline). 3-4L NC - patient removing oxymask from face frequently. Mitts ordered if needed. Patient refused all medications and oral intake. Son updated by Rodriguez NP at bedside.    Plan: monitor HR   
Eat healthy foods you enjoy. Apixaban/Eliquis DOES NOT have a special diet. Limit your alcohol intake.

## 2025-04-21 NOTE — PROGRESS NOTES
Care Management Discharge Note    Discharge Date: 04/21/2025       Discharge Disposition: Transitional Care    Discharge Services:      Discharge DME:      Discharge Transportation: other (see comments) (wheelchair vs stretcher due to pain and mobility)    Private pay costs discussed: private room/amenity fees and transportation costs    Does the patient's insurance plan have a 3 day qualifying hospital stay waiver?  Yes     Which insurance plan 3 day waiver is available? Alternative insurance waiver    Will the waiver be used for post-acute placement? Yes    PAS Confirmation Code: [id number]  Patient/family educated on Medicare website which has current facility and service quality ratings: yes    Education Provided on the Discharge Plan: Yes  Persons Notified of Discharge Plans: Darlene  Patient/Family in Agreement with the Plan: yes    Handoff Referral Completed: No, handoff not indicated or clinically appropriate    Additional Information:  [facility] has [facility] [id number] PCS completed and sent via epic. Writer messaged provider requested discharge orders if ready. Message sent to [facility] to update on ride time.     PAS-RR    D: Per DHS regulation, SW completed and submitted PAS-RR to [place] [employer] via the [employer] Line.  PAS-RR confirmation # is : [id number]    I: SW spoke with family and they are aware a PAS-RR has been submitted.  SW reviewed with family that they may be contacted for a follow up appointment within 10 days of hospital discharge if their SNF stay is < 30 days.  Contact information for [facility] was also provided.    A: family verbalized understanding.    P: Further questions may be directed to [facility] at #[phone], option #4 for PAS-RR staff.     Orders received and faxed. TCU confirmed plan. Call to Darlene to confirm plan and she was pulling into the parking lot so she will be here until transport. She  asked if she could ride along and writer explained that she will need to discuss that with the  and she understands.    FELIPE Marcelino

## 2025-04-21 NOTE — DISCHARGE SUMMARY
[facility]  Hospitalist Discharge Summary      Date of Admission:  4/15/2025  Date of Discharge:  4/21/2025  Discharging Provider: Lane Ramsey MD  Discharge Service: Hospitalist Service    Discharge Diagnoses   Mechanical fall at care facility.    Physical deconditioning from medical illness, senile frailty  Acute nondisplaced to minimally displaced left pubic rami fractures.   Probable nondisplaced left sacral alar fracture.   Severe spinal canal [id number].  Acute hypoxic respiratory failure likely from community-acquired pneumonia, underlying chronic lung infection/bronchiectasis.  Possible pneumonia community-acquired versus aspiration.  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  Dysphagia    Clinically Significant Risk Factors          Follow-ups Needed After Discharge   Follow-up Appointments       Follow Up and recommended labs and tests      Follow up with Nursing home physician.  No follow up labs or test are needed.                Unresulted Labs Ordered in the Past 30 Days of this Admission       No orders found from 3/16/2025 to 4/16/2025.          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     Orthopedic surgery followed, recommend nonoperative intervention.  On 4/17 p.m. patient A-fib with RVR, transferred to heart center -received IV metoprolol, IV Cardizem.     Suspect mechanical fall at care facility.     Physical deconditioning from medical illness, senile frailty  Acute nondisplaced to minimally displaced left pubic rami fractures.   Probable nondisplaced left sacral alar fracture.   Severe spinal canal [id number]-[id number].  History of osteoporosis.  Patient resident of assisted living facility, independent and ambulates with walker.  -Per family report, patient received a phone call and was walking fast from a room to a different room to answer the call, might have tripped on the cord and fell.  Patient complained of left upper hip pain.  --CT head no intracranial hemorrhage, mass lesions, hydrocephalus or CT evidence for an acute infarct.    --CT C-spine no fracture or posttraumatic subluxation.  Severe spinal canal narrowing at C5-C6. Moderate to severe bilateral neuroforaminal narrowing at C5-C6.  -X-ray of the pelvis showed there is soft tissue mineralization along the lateral aspect of the left thigh and proximal femur.    --CT left hip without contrast  No evidence of any acute or displaced periprosthetic fracture involving the proximal femur.  Previously seen irregularity on x-ray was likely summation artifact.  Acute nondisplaced due to minimally displaced left-sided pubic rami fractures seen.  Probable nondisplaced left sacral rober fracture.  Bones are markedly demineralized.  -- 4/18 - patient lying in bed. pain controlled.  Sitter by bedside.  - Orthopedic surgery followed, Non-operative management recommended from the orthopedic standpoint regarding pubic rami fractures, and left sacral ala. Appreciate comanagement.  -Weightbearing as tolerated right lower extremity.  -Scheduled Tylenol 3 times daily.  -Gabapentin 100 mg twice daily ordered.  -Lidocaine patch topical application.  -As needed Atarax every 6 hours as needed for adjuvant pain.  -As needed Robaxin as needed for muscle spasms  -Discontinue narcotics-family reports has done poorly with narcotics in the past and would not like to administer  narcotics.  -Continue PTA vitamin D supplements.   -Resume PTA alendronate.   Rehab team evaluation.  Fall precautions  Ride arranged for Monday the 21st to TCU   Follow up with specialist at [facility] in 6 weeks for repeat x-rays and reevaluation. Please call [phone] ([place]) or [phone] ([place]) to schedule.     Paroxysmal atrial fibrillation with RVR, now in sinus rhythm.  Elevated troponin likely from demand ischemia from fall.  On 4/17 A-fib with RVR, received IV Cardizem, IV metoprolol.   TSH, magnesium within normal limits  This morning in sinus rhythm, will start on metoprolol 12.5 mg twice daily with hold parameters.  Will initiate aspirin 81 mg oral daily, on subcutaneous Lovenox for DVT prophylaxis.  As needed IV metoprolol.  Telemetry monitoring.  Echocardiogram -> Left ventricular systolic function is normal.The visual ejection fraction is 60-65%. The right ventricular systolic function is mildly reduced. There is mild (1+) mitral regurgitation.There is moderate (2+) tricuspid regurgitation.There is mild (1+) aortic regurgitation. Pulmonary hypertension- RVSP 48 mm hg +RA.  Cardiology consultation -> Recommend very low-dose beta-blocker, baby aspirin daily and no anticoagulation. No ischemic work up warranted.     Acute hypoxic respiratory failure likely from community-acquired pneumonia, underlying chronic lung infection/bronchiectasis.  Possible pneumonia community-acquired versus aspiration.  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  Dysphagia  Per family report has had low oxygen levels in the past, not on oxygen at baseline.  In ED at the time of evaluation afebrile, O2 sats 89% on 4 L, with 6 L improved to 94%.  WBC 11.9, lactic acid 0.6, procalcitonin 0.10.  pH 7.37, pCO2 51.  Troponin high-sensitivity 21 >21, N-terminal proBNP 218.  CT chest showed no PE or acute traumatic findings in  the chest. Findings suggestive of airway infection/inflammation as described, which can be seen in the setting of Mycobacterium AVM intracellular. Scattered mucous plugging and airway centric nodular opacities and tree-in-bud. No lobar consolidation. Unchanged biapical scarring. No pleural effusion or pneumothorax.   -- Continues to require supplemental nasal oxygen, 3 L this morning  Continue intravenous ceftriaxone, azithromycin.  [4/15].  Blood cultures no growth to date.  Follow clinical improvement and accordingly de-escalate antibiotics.  Wean off supplemental nasal oxygen.  Minimize narcotic use.  I-S, Acapella use.  Speech therapy at TCU.       Acute encephalopathy likely multifactorial from pain/hospitalization/narcotic use.  Baseline dementia.  History of anxiety, depression.  At baseline dementia, forgetful.  Avoid narcotics.  Continue PTA Zoloft.  As needed Seroquel ordered     History of polyarteritis nodosa biopsy-proven;  History of neuropathy secondary to above;  Continue Imuran and gabapentin     Goals of care.  Discussed goals of care with patient, son and daughter on 4/18-continue restorative care -including diagnostic/therapeutic management.  DNR, DNI.    Consultations This Hospital Stay   SPEECH LANGUAGE PATH ADULT IP CONSULT  ORTHOPEDIC SURGERY IP CONSULT  PHYSICAL THERAPY ADULT IP CONSULT  CARE MANAGEMENT / SOCIAL WORK IP CONSULT  CARE MANAGEMENT / SOCIAL WORK IP CONSULT  CARDIOLOGY IP CONSULT  PHYSICAL THERAPY ADULT IP CONSULT  OCCUPATIONAL THERAPY ADULT IP CONSULT  SPEECH LANGUAGE PATH ADULT IP CONSULT    Code Status   No CPR- Do NOT Intubate    Time Spent on this Encounter   ILane MD, personally saw the patient today and spent greater than 30 minutes discharging this patient.       Lane Ramsey MD  [facility]  [address] [place] [id number]  Phone: [phone]  Fax:  [phone]  ______________________________________________________________________    Physical Exam   Vital Signs: Temp: 98  F (36.7  C) Temp src: Oral BP: 123/70 Pulse: 92   Resp: 16 SpO2: 92 % O2 Device: Nasal cannula Oxygen Delivery: 3 LPM  Weight: 112 lbs 14.01 oz  ----------------------------------------------------------------------------------------       Primary Care Physician   Susan Hanley    Discharge Orders      General info for SNF    Length of Stay Estimate: Short Term Care: Estimated # of Days <30  Condition at Discharge: Improving  Level of care:skilled   Rehabilitation Potential: Good  Admission H&P remains valid and up-to-date: Yes  Recent Chemotherapy: N/A  Use Nursing Home Standing Orders: Yes     Mantoux instructions    Give two-step Mantoux (PPD) Per Facility Policy Yes     Follow Up and recommended labs and tests    Follow up with Nursing home physician.  No follow up labs or test are needed.     Daily weights    Call Provider for weight gain of more than 2 pounds per day or 5 pounds per week.     Intake and output    Every shift     Reason for your hospital stay    You had a fall at assisted living facility.     Activity - Up with assistive device     No CPR- Do NOT Intubate     Physical Therapy Adult Consult    Evaluate and treat as clinically indicated.    Reason:  physical deconditioning     Occupational Therapy Adult Consult    Evaluate and treat as clinically indicated.    Reason:  cognitive impairment     Speech Language Path Adult Consult    Evaluate and treat as clinically indicated.    Reason:  dysphagia     Oxygen (SNF/TCU) Discharge     Fall precautions     Diet    Follow this diet upon discharge: Current Diet:Orders Placed This Encounter      Room Service      Snacks/Supplements Adult: Ensure Enlive; With Meals      Combination Diet Easy to Chew (level 7); Thin Liquids (level 0) (Eat only when alert and upright, close assistance for positioning and set up.  Straw ok with  single sips at a time.)       Significant Results and Procedures   Most Recent 3 CBC's:  Recent Labs   Lab Test 04/18/25  1047 04/17/25  1833 04/17/25  0904 04/16/25  0836 04/15/25  1944   WBC 12.5* 14.6* 13.3* 16.9* 11.9*   HGB  --  13.1 12.4 13.4 13.7   MCV  --  94  --  95 96   PLT  --  214  --  226 233     Most Recent 3 BMP's:  Recent Labs   Lab Test 04/20/25  0559 04/19/25  0533 04/18/25  1047 04/18/25  0033 04/17/25  1833 04/17/25  1810 04/17/25  0904 04/16/25  0836   NA  --   --   --   --  137  --  139 140   POTASSIUM 3.8 3.7 3.8   < > 3.4  --  3.6 3.7   CHLORIDE  --   --   --   --  99  --  103 103   CO2  --   --   --   --  26  --  22 24   BUN  --   --   --   --  20.2  --  19.3 13.5   CR  --   --   --   --  0.69  --  0.62 0.62   ANIONGAP  --   --   --   --  12  --  14 13   MICHELLE  --   --   --   --  9.2  --  8.9 9.0   GLC  --   --   --   --  112* 129* 87 126*    < > = values in this interval not displayed.     Most Recent 2 LFT's:  Recent Labs   Lab Test 04/15/25  1944   AST 29   ALT 31   ALKPHOS 69   BILITOTAL 0.5     Most Recent 3 INR's:No lab results found.  Most Recent 3 Troponin's:No lab results found.  Most Recent 3 BNP's:  Recent Labs   Lab Test 04/15/25  1944   NTBNPI 218     Most Recent D-dimer:No lab results found.  Most Recent Cholesterol Panel:No lab results found.  7-Day Micro Results       Collected Updated Procedure Result Status      04/15/2025 2131 04/21/2025 0301 Blood Culture Peripheral Blood [[id number]]   Peripheral Blood    Final result Component Value   Culture No Growth               04/15/2025 1944 04/20/2025 2316 Blood Culture Peripheral Blood [[id number]]   Peripheral Blood    Final result Component Value   Culture No Growth                     Most Recent TSH and T4:  Recent Labs   Lab Test 04/17/25  1833   TSH 3.02     Most Recent Urinalysis:  Recent Labs   Lab Test 04/16/25  0413   COLOR Light Yellow   APPEARANCE Clear   URINEGLC Negative   URINEBILI Negative   URINEKETONE Trace*    SG 1.027   UBLD Negative   URINEPH 7.0   PROTEIN Negative   NITRITE Negative   LEUKEST Trace*   RBCU 1   WBCU 7*     Most Recent ESR & CRP:No lab results found.,   Results for orders placed or performed during the hospital encounter of 04/15/25   XR Pelvis w Hip Left 1 View    Narrative    EXAM: XR PELVIS AND HIP LEFT 1 VIEW  LOCATION: [facility]  DATE: 04/15/2025    INDICATION: Left hip pain after fall; hx of LEXIS, concern for periprosthetic hip fracture.  COMPARISON: 08/27/2019      Impression    IMPRESSION: Bones are markedly demineralized. Bilateral hip implants.    There is some soft tissue mineralization along the lateral aspect of the left thigh and proximal femur. Additionally, there is some subtle cortical undulation which is concerning but not completely diagnostic for a nondisplaced left proximal femoral   periprosthetic fracture. Recommend CT for further evaluation.    There is also some focal cortical irregularity along the left inferior pubic ramus, also concerning for a nondisplaced fracture.    Focal cortical thickening along the contralateral right inferior pubic ramus is favored to reflect remodeling from a chronic fracture.    NOTE: ABNORMAL REPORT    THE DICTATION ABOVE DESCRIBES AN ABNORMALITY FOR WHICH FOLLOWUP IS NEEDED.    CT Head w/o Contrast    Narrative    EXAM: CT HEAD W/O CONTRAST  LOCATION: [facility]  DATE: 4/15/2025    INDICATION: Fall, closed head injury  COMPARISON: None.  TECHNIQUE: Routine CT Head without IV contrast. Multiplanar reformats. Dose reduction techniques were used.    FINDINGS:  INTRACRANIAL CONTENTS: No intracranial hemorrhage. Mild diffuse cerebral parenchymal volume loss. No midline shift. The basilar cisterns are patent. Mild periventricular and scattered foci of deep white matter hypodensities involving both cerebral   hemispheres. No CT evidence for an acute infarct.    VISUALIZED ORBITS/SINUSES/MASTOIDS: No  intraorbital abnormality. No paranasal sinus mucosal disease. Mild nasoseptal bowing to the right anteriorly. No middle ear or mastoid effusion.    BONES/SOFT TISSUES: No acute abnormality.      Impression    IMPRESSION:  1.  No intracranial hemorrhage, mass lesions, hydrocephalus or CT evidence for an acute infarct.  2.  Mild diffuse cerebral parenchymal volume loss. Presumed chronic hypertensive/microvascular ischemic white matter changes.     CT Cervical Spine w/o Contrast    Narrative    EXAM: CT CERVICAL SPINE W/O CONTRAST  LOCATION: [facility]  DATE: 4/15/2025    INDICATION: Fall, neck pain  COMPARISON: None.  TECHNIQUE: Routine CT Cervical Spine without IV contrast. Multiplanar reformats. Dose reduction techniques were used.    FINDINGS:  VERTEBRA: 2 mm spondylolisthesis of C4 on C5. 2 mm retrolisthesis of C5 on C6. 1 mm spondylolisthesis of C7 on T1 and T1 on T2. Craniocervical junction is within normal limits. Vertebral body heights are maintained. No fractures.    CANAL/FORAMINA: Severe intervertebral disc height loss and endplate spurring at C5-C6. Mild spinal canal narrowing at C4-C5. Severe spinal canal narrowing at C5-C6.    Moderate right neuroforaminal narrowing at C3-C4. Mild left neuroforaminal narrowing at C4-C5. Moderate to severe bilateral neuroforaminal narrowing at C5-C6.    PARASPINAL: Mild symmetric atrophy of the posterior paraspinal musculature. The lung apices are clear.      Impression    IMPRESSION:  1.  No fracture or posttraumatic subluxation.  2.  Severe spinal canal narrowing at C5-C6.  3.  Moderate to severe bilateral neuroforaminal narrowing at C5-C6.     CT Chest Pulmonary Embolism w Contrast    Narrative    EXAM: CT CHEST PULMONARY EMBOLISM W CONTRAST  LOCATION: [facility]  DATE: 4/15/2025    INDICATION: Fall, hypoxia  COMPARISON: CT chest, abdomen and pelvis 5/16/2011  TECHNIQUE: CT chest pulmonary angiogram during arterial  phase injection of IV contrast. Multiplanar reformats and MIP reconstructions were performed. Dose reduction techniques were used.   CONTRAST: 53mL Isovue 370    FINDINGS:  ANGIOGRAM CHEST: Pulmonary arteries are normal caliber and negative for pulmonary emboli. Within limitations from contrast timing, no convincing evidence of dissection. No CT evidence of right heart strain.    LUNGS AND PLEURA: Scattered mucus plugging and airway centric nodular opacities and tree-in-bud. No lobar consolidation. Unchanged biapical scarring. No pleural effusion or pneumothorax.    MEDIASTINUM/AXILLAE: Nonenlarged heart. No pericardial effusion. Calcified mediastinal lymphadenopathy from prior granulomatous disease.    CORONARY ARTERY CALCIFICATION: Severe.    UPPER ABDOMEN: No acute findings.    MUSCULOSKELETAL: Moderate T8, severe T9 and L1 compression deformities appear chronic. Numerous chronic appearing bilateral rib deformities.      Impression    IMPRESSION:  1.  No pulmonary embolism or acute traumatic findings in the chest.  2.  Findings suggesting airway infection/inflammation as described, which can be seen in the setting of mycobacterium avium intracellulare.  3.  Additional chronic noncritical details in the findings.   CT Hip Left w/o Contrast    Narrative    EXAM: CT HIP LEFT W/O CONTRAST  LOCATION: [facility]  DATE: 4/15/2025    INDICATION: Non diagnostic imaging on x-ray, radiology recommended CT for possible periprosthetic hip fracture.  COMPARISON:  Same day radiograph. 08/27/2019.  TECHNIQUE: Noncontrast. Axial, sagittal and coronal thin-section reconstruction. Dose reduction techniques were used.     FINDINGS:     Bones are markedly demineralized and there are changes of a left hip implant. Distal tip of the stem is excluded from the field-of-view. Minimally displaced acute appearing left inferior pubic ramus fracture. Additional nondisplaced left superior   parasymphyseal  fracture.    There is some focal cortical irregularity along the left sacral ala probably representing a fracture. There is no evidence of a dislocation.    Cement versus mineralization along the lateral margin of the soft tissues of the proximal femur again seen. Previously seen irregularity in the region was summation artifact as there is no evidence for an acute proximal femoral periprosthetic fracture.    Atherosclerotic vascular calcifications. Visualized portions of the colon show colonic diverticulosis. There is no large soft tissue hematoma.    Remote healed fracture deformity.      Impression     IMPRESSION:  1.  Left hip implant. No evidence of an acute displaced periprosthetic fracture involving the proximal femur. Previously seen irregularity on radiograph was likely summation artifact. Note is made that the distal tip of the stem is excluded from the   field-of-view.    2.  Acute nondisplaced to minimally displaced left pubic rami fractures.    3.  Probable nondisplaced left sacral alar fracture.    4.  Bones are markedly demineralized.        NOTE: ABNORMAL REPORT    THE DICTATION ABOVE DESCRIBES AN ABNORMALITY FOR WHICH FOLLOW-UP IS NEEDED.          XR Chest Port 1 View    Narrative    EXAM: XR CHEST PORT 1 VIEW  LOCATION: [facility]  DATE: 4/17/2025    INDICATION: Shortness of breath.  COMPARISON: Chest radiograph 6/30/2019 and CTA chest 4/15/2025.      Impression    IMPRESSION: Heart size is normal. Diffuse bilateral mixed interstitial and alveolar opacities, likely multifocal infection. Small bilateral pleural effusions with bibasilar opacities which could be due to infection and/or atelectasis. No pneumothorax.   Echocardiogram Limited     Value    LVEF  60-65%    Narrative    [id number]  [id number]  [id number]  [id number]^MARLENE^BAM^[facility]  Echocardiography Laboratory  [address] [place], [place] [address]     Name: DORIS SAVAGE  A  MRN: [id number]  : 1930  Study Date: 2025 09:33 AM  Age: 89 yrs  Gender: Female  Patient Location: [facility]  Reason For Study: Syncope and Collapse  Ordering Physician: BAM ROSARIO  Referring Physician: BAM ROSARIO  Performed By: Sandor Mensah     BSA: 1.5 m2  Height: 64 in  Weight: 113 lb  HR: 99  BP: 91/57 mmHg  ______________________________________________________________________________  Procedure  Limited Echocardiogram with two-dimensional, color and spectral Doppler.  ______________________________________________________________________________  Interpretation Summary     Left ventricular systolic function is normal.The visual ejection fraction is  60-65%.  The right ventricular systolic function is mildly reduced.  There is mild (1+) mitral regurgitation.There is moderate (2+) tricuspid  regurgitation.There is mild (1+) aortic regurgitation.  Pulmonary hypertension- RVSP 48 mm hg +RA.  Aortic root dilatation is present.  ______________________________________________________________________________  Left Ventricle  The left ventricle is normal in size. There is normal left ventricular wall  thickness. Left ventricular systolic function is normal. The visual ejection  fraction is 60-65%. No regional wall motion abnormalities noted.     Right Ventricle  The right ventricle is normal size. The right ventricular systolic function is  mildly reduced.     Atria  The left atrium is mildly dilated. The right atrium is mildly dilated. There  is no color Doppler evidence of an atrial shunt.     Mitral Valve  There is mild (1+) mitral regurgitation.     Tricuspid Valve  There is moderate (2+) tricuspid regurgitation. The right ventricular systolic  pressure is approximated at 48.1 mmHg plus the right atrial pressure.  Pulmonary hypertension.     Aortic Valve  There is mild trileaflet aortic sclerosis. There is mild (1+) aortic  regurgitation. No aortic stenosis is  present.     Pulmonic Valve  There is no pulmonic valvular stenosis.     Vessels  Aortic root dilatation is present. 4.1 cm. Normal size ascending aorta. IVC  diameter and respiratory changes fall into an intermediate range suggesting an  RA pressure of 8 mmHg.     Pericardium  There is no pericardial effusion.     ______________________________________________________________________________  MMode/2D Measurements & Calculations  IVSd: 0.80 cm  LVIDd: 4.2 cm  LVIDs: 2.8 cm  LVPWd: 0.90 cm  FS: 33.3 %  LV mass(C)d: 109.8 grams  LV mass(C)dI: 71.6 grams/m2     Ao root diam: 4.1 cm  asc Aorta Diam: 3.7 cm  LVOT diam: 2.0 cm  LVOT area: 3.1 cm2  Ao root diam index Ht(cm/m): 2.5  Ao root diam index BSA (cm/m2): 2.7  Asc Ao diam index BSA (cm/m2): 2.4  Asc Ao diam index Ht(cm/m): 2.3  LA Volume (BP): 28.6 ml  LA Volume Index (BP): 18.7 ml/m2  RWT: 0.43     TAPSE: 1.3 cm     Doppler Measurements & Calculations  AI P1/2t: 702.1 msec  PA acc time: 0.10 sec  TR max bob: 346.2 cm/sec  TR max P.1 mmHg  RV S Bob: 8.3 cm/sec     ______________________________________________________________________________  Report approved by: Martir Smith MD on 2025 11:39 AM             Discharge Medications   Current Discharge Medication List        START taking these medications    Details   aspirin (ASA) 81 MG EC tablet Take 1 tablet (81 mg) by mouth daily.    Associated Diagnoses: Paroxysmal atrial fibrillation (H)      cefuroxime (CEFTIN) 500 MG tablet Take 1 tablet (500 mg) by mouth 2 times daily for 5 days.    Associated Diagnoses: Acute respiratory failure with hypoxia (H); Pneumonia of both lower lobes due to infectious organism      doxycycline hyclate (VIBRAMYCIN) 100 MG capsule Take 1 capsule (100 mg) by mouth 2 times daily for 5 days.    Associated Diagnoses: Acute respiratory failure with hypoxia (H); Pneumonia of both lower lobes due to infectious organism      hydrOXYzine HCl (ATARAX) 10 MG tablet Take 1 tablet (10  mg) by mouth 3 times daily as needed for other (adjuvant pain).    Associated Diagnoses: Periprosthetic fracture of hip, initial encounter      Lidocaine (LIDOCARE) 4 % Patch Place 1 patch over 12 hours onto the skin every 24 hours. To prevent lidocaine toxicity, patient should be patch free for 12 hrs daily.    Associated Diagnoses: Pubic ramus fracture, left, closed, initial encounter (H)      methocarbamol (ROBAXIN) 500 MG tablet Take 1 tablet (500 mg) by mouth every 8 hours as needed for muscle spasms.    Associated Diagnoses: Periprosthetic fracture of hip, initial encounter      metoprolol tartrate (LOPRESSOR) 25 MG tablet Take 0.5 tablets (12.5 mg) by mouth 2 times daily.    Associated Diagnoses: Pubic ramus fracture, left, closed, initial encounter (H); Paroxysmal atrial fibrillation (H)      polyethylene glycol (MIRALAX) 17 GM/Dose powder Take 17 g by mouth daily.    Associated Diagnoses: Pubic ramus fracture, left, closed, initial encounter (H)      QUEtiapine (SEROQUEL) 25 MG tablet Take 0.5 tablets (12.5 mg) by mouth 2 times daily as needed (anxiety / sleep).    Associated Diagnoses: Pubic ramus fracture, left, closed, initial encounter (H)           CONTINUE these medications which have CHANGED    Details   acetaminophen (TYLENOL) 325 MG tablet Take 3 tablets (975 mg) by mouth every 8 hours.    Associated Diagnoses: Periprosthetic fracture of hip, initial encounter      gabapentin (NEURONTIN) 100 MG capsule Take 1 capsule (100 mg) by mouth 2 times daily.    Associated Diagnoses: Pubic ramus fracture, left, closed, initial encounter (H)           CONTINUE these medications which have NOT CHANGED    Details   alendronate (FOSAMAX) 70 MG tablet Take 70 mg by mouth every 7 days. Tuesdays      azaTHIOprine (IMURAN) 50 MG tablet Take 50 mg by mouth daily.      bismuth subsalicylate (PEPTO-BISMOL) 262 MG chewable tablet Take 2 tablets by mouth every hour as needed (max 8 tablets/24 hours)      calcium  carbonate 500 mg, elemental, (OSCAL;OYSTER SHELL CALCIUM) 500 MG tablet Take 500 mg by mouth daily. On tuesdays      fluticasone (FLONASE) 50 MCG/ACT nasal spray Spray 2 sprays into both nostrils daily      fluticasone-salmeterol (ADVAIR) 500-50 MCG/ACT inhaler Inhale 1 puff into the lungs 2 times daily.      ipratropium (ATROVENT) 0.06 % nasal spray Spray 2 sprays into both nostrils 2 times daily.      levothyroxine (SYNTHROID/LEVOTHROID) 75 MCG tablet Take 75 mcg by mouth daily.      melatonin 3 MG tablet Take 3 mg by mouth at bedtime.      senna (SENOKOT) 8.6 MG tablet Take 1 tablet by mouth daily.      sertraline (ZOLOFT) 25 MG tablet Take 25 mg by mouth at bedtime.      Vitamin D3 (CHOLECALCIFEROL) 125 MCG (5000 UT) tablet Take 1 tablet by mouth daily.           Allergies   Allergies   Allergen Reactions    Zolpidem Other (See Comments)     Confusion even with low dose.

## 2025-04-21 NOTE — CARE PLAN
Physical Therapy Discharge Summary    Reason for therapy discharge:    Discharged to transitional care facility.    Progress towards therapy goal(s). See goals on Care Plan in [facility] electronic health record for goal details.  Goals not met.  Barriers to achieving goals:   limited tolerance for therapy.    Therapy recommendation(s):    Continued therapy is recommended.  Rationale/Recommendations:  to maximize functional mob I, ronaldo and safety.

## 2025-04-22 ENCOUNTER — PATIENT OUTREACH (OUTPATIENT)
Dept: CARE COORDINATION | Facility: CLINIC | Age: OVER 89
End: 2025-04-22
Payer: COMMERCIAL

## 2025-04-22 LAB
ATRIAL RATE - MUSE: 150 BPM
DIASTOLIC BLOOD PRESSURE - MUSE: NORMAL MMHG
INTERPRETATION ECG - MUSE: NORMAL
P AXIS - MUSE: NORMAL DEGREES
PR INTERVAL - MUSE: NORMAL MS
QRS DURATION - MUSE: 82 MS
QT - MUSE: 290 MS
QTC - MUSE: 489 MS
R AXIS - MUSE: -34 DEGREES
SYSTOLIC BLOOD PRESSURE - MUSE: NORMAL MMHG
T AXIS - MUSE: 14 DEGREES
VENTRICULAR RATE- MUSE: 171 BPM

## 2025-04-22 NOTE — PROGRESS NOTES
[facility]: [facility] [place]    Background: Transitional Care Management program identified per system criteria and reviewed by [facility] team for possible outreach.    Assessment: Upon chart review, [facility] Team member will not proceed with patient outreach related to this episode of Transitional Care Management program due to reason below:    Non-[facility] TCU: [facility] team member noted patient discharged to TCU/ARU/LTACH. Patient is not established with a [facility] currently supported by Primary Care-Care Coordination therefore handoff to Primary Care-Care Coordination is not appropriate at this time.    Plan: Transitional Care Management episode addressed appropriately per reason noted above.      Matilde Kim  Community Health Worker  [facility], [facility]  [phone]     *Connected Care Resource Team does NOT follow patient ongoing. Referrals are identified based on internal discharge reports and the outreach is to ensure patient has an understanding of their discharge instructions.

## 2025-04-23 ENCOUNTER — DOCUMENTATION ONLY (OUTPATIENT)
Dept: OTHER | Facility: CLINIC | Age: OVER 89
End: 2025-04-23
Payer: COMMERCIAL

## (undated) DEVICE — WIRE GUIDE 3.2X400MM  357.399

## (undated) DEVICE — GLOVE PROTEXIS POWDER FREE 7.5 ORTHOPEDIC 2D73ET75

## (undated) DEVICE — SU MONOCRYL 3-0 PS-2 18" UND Y497G

## (undated) DEVICE — GLOVE PROTEXIS BLUE W/NEU-THERA 8.0  2D73EB80

## (undated) DEVICE — DRAPE STERI TOWEL SM 1000

## (undated) DEVICE — PREP CHLORAPREP 26ML TINTED ORANGE  260815

## (undated) DEVICE — BONE CLEANING TIP INTERPULSE FEMORAL CANAL 0210-008-000

## (undated) DEVICE — SPONGE LAP 4X18" X8415

## (undated) DEVICE — SU VICRYL 2-0 CT-1 27" UND J259H

## (undated) DEVICE — SPONGE BALL KERLIX ROUND XL W/O STRING LATEX 4935

## (undated) DEVICE — SOL WATER IRRIG 1000ML BOTTLE 2F7114

## (undated) DEVICE — SU VICRYL 2-0 CP-1 27" UND J266H

## (undated) DEVICE — SU VICRYL 0 CTX 36" J370H

## (undated) DEVICE — PACK HIP NAILING SOP32HPFC4

## (undated) DEVICE — LINEN TOWEL PACK X5 5464

## (undated) DEVICE — DRAPE STERI U 1015

## (undated) DEVICE — STPL SKIN 35W ROTATING HEAD PRW35

## (undated) DEVICE — Device

## (undated) DEVICE — SUCTION IRR SYSTEM W/O TIP INTERPULSE HANDPIECE 0210-100-000

## (undated) DEVICE — GLOVE PROTEXIS POWDER FREE 8.0 ORTHOPEDIC 2D73ET80

## (undated) DEVICE — SU MONOCRYL 3-0 PS-2 27" Y427H

## (undated) DEVICE — GLOVE PROTEXIS BLUE W/NEU-THERA 7.0  2D73EB70

## (undated) DEVICE — GLOVE PROTEXIS POWDER FREE SMT 8.0  2D72PT80X

## (undated) DEVICE — GLOVE PROTEXIS W/NEU-THERA 7.5  2D73TE75

## (undated) DEVICE — IMM PILLOW ABDUCT HIP MED 31143061

## (undated) DEVICE — DRAPE SHEET REV FOLD 3/4 9349

## (undated) DEVICE — SU ETHIBOND 0 CT-1 CR 8X18" CX21D

## (undated) DEVICE — PACK TOTAL HIP W/U DRAPE SOP15HUFSC

## (undated) DEVICE — SU MONOCRYL 2-0 CT-2 27" Y333H

## (undated) DEVICE — GLOVE PROTEXIS POWDER FREE 7.0 ORTHOPEDIC 2D73ET70

## (undated) DEVICE — DRSG KERLIX FLUFFS X5

## (undated) DEVICE — SPONGE LAP 18X18" X8435

## (undated) DEVICE — GOWN IMPERVIOUS SPECIALTY XLG/XLONG 32474

## (undated) DEVICE — MANIFOLD NEPTUNE 4 PORT 700-20

## (undated) DEVICE — CAST PADDING 4" UNSTERILE 9044

## (undated) DEVICE — SU DERMABOND MINI DHVM12

## (undated) DEVICE — ESU GROUND PAD UNIVERSAL W/O CORD

## (undated) DEVICE — CEMENT PRESSURIZER FEMORAL CANAL MED 0206-546-000

## (undated) DEVICE — LINEN TOWEL PACK X10 5473

## (undated) DEVICE — KIT SURGICAL TURNOVER FVSD-01D

## (undated) DEVICE — BONE CEMENT MIXEVAC HI VAC W/CARTRIDGE 0306-563-000

## (undated) DEVICE — IMM PILLOW ABDUCT HIP MED

## (undated) DEVICE — DRSG ABDOMINAL 07 1/2X8" 7197D

## (undated) DEVICE — BLADE SAW SAGITTAL STRK 19.5X90X1.27MM 2108-109-000S11

## (undated) DEVICE — SOL NACL 0.9% IRRIG 1000ML BOTTLE 2F7124

## (undated) DEVICE — DRSG XEROFORM 5X9" 8884431605

## (undated) DEVICE — DRAPE IOBAN INCISE 23X17" 6650EZ

## (undated) DEVICE — DRSG AQUACEL AG 3.5X6.0" HYDROFIBER 412010

## (undated) DEVICE — BRUSH INTRAMEDULLARY

## (undated) RX ORDER — FENTANYL CITRATE 50 UG/ML
INJECTION, SOLUTION INTRAMUSCULAR; INTRAVENOUS
Status: DISPENSED
Start: 2019-08-27

## (undated) RX ORDER — FENTANYL CITRATE 50 UG/ML
INJECTION, SOLUTION INTRAMUSCULAR; INTRAVENOUS
Status: DISPENSED
Start: 2019-07-01

## (undated) RX ORDER — PROPOFOL 10 MG/ML
INJECTION, EMULSION INTRAVENOUS
Status: DISPENSED
Start: 2019-07-01

## (undated) RX ORDER — CEFAZOLIN SODIUM 2 G/100ML
INJECTION, SOLUTION INTRAVENOUS
Status: DISPENSED
Start: 2019-07-01

## (undated) RX ORDER — ONDANSETRON 2 MG/ML
INJECTION INTRAMUSCULAR; INTRAVENOUS
Status: DISPENSED
Start: 2019-07-01

## (undated) RX ORDER — CEFAZOLIN SODIUM 2 G/100ML
INJECTION, SOLUTION INTRAVENOUS
Status: DISPENSED
Start: 2019-08-27

## (undated) RX ORDER — PROPOFOL 10 MG/ML
INJECTION, EMULSION INTRAVENOUS
Status: DISPENSED
Start: 2019-08-27